# Patient Record
Sex: FEMALE | Race: WHITE | NOT HISPANIC OR LATINO | URBAN - METROPOLITAN AREA
[De-identification: names, ages, dates, MRNs, and addresses within clinical notes are randomized per-mention and may not be internally consistent; named-entity substitution may affect disease eponyms.]

---

## 2022-02-11 ENCOUNTER — SPOT (OUTPATIENT)
Dept: URBAN - METROPOLITAN AREA CLINIC 38 | Facility: CLINIC | Age: 72
Setting detail: DERMATOLOGY
End: 2022-02-11

## 2022-02-11 DIAGNOSIS — L57.0 ACTINIC KERATOSIS: ICD-10-CM

## 2022-02-11 PROBLEM — D23.5 OTHER BENIGN NEOPLASM OF SKIN OF TRUNK: Status: RESOLVED | Noted: 2022-02-11

## 2022-02-11 PROCEDURE — 17000 DESTRUCT PREMALG LESION: CPT

## 2022-02-11 PROCEDURE — 99203 OFFICE O/P NEW LOW 30 MIN: CPT

## 2023-02-08 ENCOUNTER — APPOINTMENT (OUTPATIENT)
Dept: URBAN - METROPOLITAN AREA CLINIC 204 | Age: 73
Setting detail: DERMATOLOGY
End: 2023-02-08

## 2023-02-08 DIAGNOSIS — L82.0 INFLAMED SEBORRHEIC KERATOSIS: ICD-10-CM

## 2023-02-08 DIAGNOSIS — L57.8 OTHER SKIN CHANGES DUE TO CHRONIC EXPOSURE TO NONIONIZING RADIATION: ICD-10-CM

## 2023-02-08 DIAGNOSIS — H61.03 CHONDRITIS OF EXTERNAL EAR: ICD-10-CM

## 2023-02-08 DIAGNOSIS — L72.0 EPIDERMAL CYST: ICD-10-CM

## 2023-02-08 DIAGNOSIS — L57.0 ACTINIC KERATOSIS: ICD-10-CM

## 2023-02-08 PROBLEM — D23.5 OTHER BENIGN NEOPLASM OF SKIN OF TRUNK: Status: ACTIVE | Noted: 2023-02-08

## 2023-02-08 PROBLEM — H61.032 CHONDRITIS OF LEFT EXTERNAL EAR: Status: ACTIVE | Noted: 2023-02-08

## 2023-02-08 PROCEDURE — OTHER LIQUID NITROGEN: OTHER

## 2023-02-08 PROCEDURE — 17000 DESTRUCT PREMALG LESION: CPT | Mod: 59

## 2023-02-08 PROCEDURE — 99214 OFFICE O/P EST MOD 30 MIN: CPT | Mod: 25

## 2023-02-08 PROCEDURE — OTHER MIPS QUALITY: OTHER

## 2023-02-08 PROCEDURE — 17110 DESTRUCT B9 LESION 1-14: CPT

## 2023-02-08 PROCEDURE — OTHER COUNSELING: OTHER

## 2023-02-08 PROCEDURE — OTHER PRESCRIPTION MEDICATION MANAGEMENT: OTHER

## 2023-02-08 PROCEDURE — OTHER PRESCRIPTION: OTHER

## 2023-02-08 RX ORDER — HYDROCORTISONE 25 MG/G
OINTMENT TOPICAL
Qty: 28.35 | Refills: 0 | Status: ERX | COMMUNITY
Start: 2023-02-08

## 2023-02-08 ASSESSMENT — LOCATION SIMPLE DESCRIPTION DERM
LOCATION SIMPLE: RIGHT UPPER BACK
LOCATION SIMPLE: GLABELLA
LOCATION SIMPLE: LEFT THIGH
LOCATION SIMPLE: RIGHT FOREHEAD
LOCATION SIMPLE: LEFT EAR
LOCATION SIMPLE: CHEST
LOCATION SIMPLE: LEFT UPPER ARM

## 2023-02-08 ASSESSMENT — LOCATION ZONE DERM
LOCATION ZONE: TRUNK
LOCATION ZONE: ARM
LOCATION ZONE: EAR
LOCATION ZONE: LEG
LOCATION ZONE: FACE

## 2023-02-08 ASSESSMENT — LOCATION DETAILED DESCRIPTION DERM
LOCATION DETAILED: LEFT ANTERIOR DISTAL UPPER ARM
LOCATION DETAILED: LEFT ANTERIOR DISTAL THIGH
LOCATION DETAILED: LEFT INFERIOR HELIX
LOCATION DETAILED: GLABELLA
LOCATION DETAILED: RIGHT MEDIAL SUPERIOR CHEST
LOCATION DETAILED: RIGHT LATERAL FOREHEAD
LOCATION DETAILED: LEFT ANTIHELIX
LOCATION DETAILED: RIGHT SUPERIOR UPPER BACK

## 2023-02-08 NOTE — PROCEDURE: PRESCRIPTION MEDICATION MANAGEMENT
Initiate Treatment: Apply Hydrocortisone ointment twice daily until clear. Can use during flare ups.
Detail Level: Zone
Render In Strict Bullet Format?: No

## 2023-02-08 NOTE — PROCEDURE: LIQUID NITROGEN
Detail Level: Detailed
Consent: The patient's verbal consent was obtained including but not limited to risks of crusting, scabbing, blistering, scarring, darker or lighter pigmentary change, recurrence, incomplete removal and infection.
Show Aperture Variable?: Yes
Aperture Size (Optional): C
Render Post-Care Instructions In Note?: no
Application Tool (Optional): Liquid Nitrogen Sprayer
Duration Of Freeze Thaw-Cycle (Seconds): 5
Post-Care Instructions: I reviewed with the patient in detail post-care instructions. Patient is to wear sunprotection, and avoid picking at any of the treated lesions. Pt may apply Vaseline to crusted or scabbing areas.
Number Of Freeze-Thaw Cycles: 2 freeze-thaw cycles
Consent: The patient's consent was obtained including but not limited to risks of crusting, scabbing, blistering, scarring, darker or lighter pigmentary change, recurrence, incomplete removal and infection.
Medical Necessity Clause: This procedure was medically necessary because the lesions that were treated were:
Medical Necessity Information: It is in your best interest to select a reason for this procedure from the list below. All of these items fulfill various CMS LCD requirements except the new and changing color options.
Spray Paint Text: The liquid nitrogen was applied to the skin utilizing a spray paint frosting technique.

## 2023-02-08 NOTE — HPI: SKIN LESION
Has Your Skin Lesion Been Treated?: been treated
Is This A New Presentation, Or A Follow-Up?: Skin Lesion
When Was It Treated?: February 2022

## 2023-02-10 ENCOUNTER — HOSPITAL ENCOUNTER (OUTPATIENT)
Dept: HOSPITAL 100 - PT | Age: 73
Discharge: HOME | End: 2023-02-10
Payer: MEDICARE

## 2023-02-10 DIAGNOSIS — M76.62: ICD-10-CM

## 2023-02-10 DIAGNOSIS — M76.61: Primary | ICD-10-CM

## 2023-02-10 DIAGNOSIS — M77.32: ICD-10-CM

## 2023-02-10 DIAGNOSIS — M77.31: ICD-10-CM

## 2023-02-10 PROCEDURE — 97110 THERAPEUTIC EXERCISES: CPT

## 2023-02-10 PROCEDURE — 97164 PT RE-EVAL EST PLAN CARE: CPT

## 2023-02-10 PROCEDURE — 97140 MANUAL THERAPY 1/> REGIONS: CPT

## 2023-02-10 PROCEDURE — 97161 PT EVAL LOW COMPLEX 20 MIN: CPT

## 2023-02-10 PROCEDURE — 97035 APP MDLTY 1+ULTRASOUND EA 15: CPT

## 2023-03-18 ENCOUNTER — HOSPITAL ENCOUNTER (OUTPATIENT)
Dept: HOSPITAL 100 - CT | Age: 73
Discharge: HOME | End: 2023-03-18
Payer: MEDICARE

## 2023-03-18 DIAGNOSIS — M76.61: Primary | ICD-10-CM

## 2023-03-18 DIAGNOSIS — M77.31: ICD-10-CM

## 2023-03-18 DIAGNOSIS — M72.2: ICD-10-CM

## 2023-03-18 PROCEDURE — 73700 CT LOWER EXTREMITY W/O DYE: CPT

## 2024-01-03 ENCOUNTER — APPOINTMENT (OUTPATIENT)
Dept: URBAN - METROPOLITAN AREA CLINIC 204 | Age: 74
Setting detail: DERMATOLOGY
End: 2024-01-03

## 2024-01-03 DIAGNOSIS — L57.8 OTHER SKIN CHANGES DUE TO CHRONIC EXPOSURE TO NONIONIZING RADIATION: ICD-10-CM

## 2024-01-03 DIAGNOSIS — H61.03 CHONDRITIS OF EXTERNAL EAR: ICD-10-CM

## 2024-01-03 DIAGNOSIS — L82.1 OTHER SEBORRHEIC KERATOSIS: ICD-10-CM

## 2024-01-03 DIAGNOSIS — L82.0 INFLAMED SEBORRHEIC KERATOSIS: ICD-10-CM

## 2024-01-03 PROBLEM — H61.032 CHONDRITIS OF LEFT EXTERNAL EAR: Status: ACTIVE | Noted: 2024-01-03

## 2024-01-03 PROBLEM — D23.5 OTHER BENIGN NEOPLASM OF SKIN OF TRUNK: Status: ACTIVE | Noted: 2024-01-03

## 2024-01-03 PROCEDURE — OTHER MIPS QUALITY: OTHER

## 2024-01-03 PROCEDURE — 99213 OFFICE O/P EST LOW 20 MIN: CPT | Mod: 25

## 2024-01-03 PROCEDURE — OTHER LIQUID NITROGEN: OTHER

## 2024-01-03 PROCEDURE — 17110 DESTRUCT B9 LESION 1-14: CPT

## 2024-01-03 PROCEDURE — OTHER COUNSELING: OTHER

## 2024-01-03 PROCEDURE — OTHER PRESCRIPTION MEDICATION MANAGEMENT: OTHER

## 2024-01-03 ASSESSMENT — LOCATION SIMPLE DESCRIPTION DERM
LOCATION SIMPLE: LEFT EAR
LOCATION SIMPLE: RIGHT BREAST
LOCATION SIMPLE: UPPER BACK
LOCATION SIMPLE: RIGHT UPPER BACK
LOCATION SIMPLE: ANTERIOR SCALP
LOCATION SIMPLE: LEFT UPPER BACK

## 2024-01-03 ASSESSMENT — LOCATION DETAILED DESCRIPTION DERM
LOCATION DETAILED: RIGHT MEDIAL BREAST 3-4:00 REGION
LOCATION DETAILED: SUPERIOR THORACIC SPINE
LOCATION DETAILED: MID-FRONTAL SCALP
LOCATION DETAILED: LEFT INFERIOR UPPER BACK
LOCATION DETAILED: RIGHT MEDIAL UPPER BACK
LOCATION DETAILED: RIGHT SUPERIOR UPPER BACK
LOCATION DETAILED: LEFT INFERIOR HELIX
LOCATION DETAILED: LEFT ANTIHELIX
LOCATION DETAILED: RIGHT SUPERIOR MEDIAL UPPER BACK
LOCATION DETAILED: RIGHT MEDIAL BREAST 4-5:00 REGION
LOCATION DETAILED: LEFT MEDIAL UPPER BACK

## 2024-01-03 ASSESSMENT — LOCATION ZONE DERM
LOCATION ZONE: SCALP
LOCATION ZONE: TRUNK
LOCATION ZONE: EAR

## 2024-01-03 NOTE — PROCEDURE: PRESCRIPTION MEDICATION MANAGEMENT
Continue Regimen: Apply Hydrocortisone ointment twice daily during flare ups.
Initiate Treatment: Apply OTC lidocaine gel
Detail Level: Zone
Render In Strict Bullet Format?: No

## 2024-01-03 NOTE — PROCEDURE: LIQUID NITROGEN
Consent: The patient's consent was obtained including but not limited to risks of crusting, scabbing, blistering, scarring, darker or lighter pigmentary change, recurrence, incomplete removal and infection.
Add 52 Modifier (Optional): no
Include Z78.9 (Other Specified Conditions Influencing Health Status) As An Associated Diagnosis?: Yes
Medical Necessity Clause: This procedure was medically necessary because the lesions that were treated were:
Detail Level: Detailed
Spray Paint Text: The liquid nitrogen was applied to the skin utilizing a spray paint frosting technique.
Post-Care Instructions: I reviewed with the patient in detail post-care instructions. Patient is to wear sunprotection, and avoid picking at any of the treated lesions. Pt may apply Vaseline to crusted or scabbing areas.
Medical Necessity Information: It is in your best interest to select a reason for this procedure from the list below. All of these items fulfill various CMS LCD requirements except the new and changing color options.

## 2024-01-17 ENCOUNTER — EVALUATION (OUTPATIENT)
Dept: PHYSICAL THERAPY | Facility: CLINIC | Age: 74
End: 2024-01-17
Payer: MEDICARE

## 2024-01-17 DIAGNOSIS — M75.41 IMPINGEMENT SYNDROME OF RIGHT SHOULDER: ICD-10-CM

## 2024-01-17 PROCEDURE — 97140 MANUAL THERAPY 1/> REGIONS: CPT | Mod: GP

## 2024-01-17 PROCEDURE — 97161 PT EVAL LOW COMPLEX 20 MIN: CPT | Mod: GP

## 2024-01-17 ASSESSMENT — PAIN - FUNCTIONAL ASSESSMENT: PAIN_FUNCTIONAL_ASSESSMENT: 0-10

## 2024-01-17 ASSESSMENT — ACTIVITIES OF DAILY LIVING (ADL): EFFECT OF PAIN ON DAILY ACTIVITIES: SEE GOALS

## 2024-01-17 ASSESSMENT — ENCOUNTER SYMPTOMS
LOSS OF SENSATION IN FEET: 0
DEPRESSION: 0
OCCASIONAL FEELINGS OF UNSTEADINESS: 0

## 2024-01-17 ASSESSMENT — PAIN SCALES - GENERAL: PAINLEVEL_OUTOF10: 5 - MODERATE PAIN

## 2024-01-17 NOTE — PROGRESS NOTES
Physical Therapy Evaluation and Treatment      Patient Name: Shona Hansen  MRN: [id number]  Today's Date: 1/17/2024  Time Calculation  Start Time: 1136  Stop Time: 1215  Time Calculation (min): 39 min    Visit: 1 /9  Assessment:  Rehab Prognosis: Good  C/C R shldr sx are associated with home remodeling done last summer.   Injection was done and films are negative.  Physical findings include limited shldr ROM, strength both with pain.   Continue with open to closed R shldr ROM/PRE as trinity. The physical therapist of record is the therapist who assumes primary responsibility for patient management and as such is held accountable for the coordination, continuation, and progression of the POC.  This patient's care and PT of record will be transferred from James Jacome PT to MAAME Galvan DPT  effective as of  1/17/24  Plan:  Treatment/Interventions: Cryotherapy, Dry needling, Education/ Instruction, Electrical stimulation, Hot pack, Manual therapy, Self care/ home management, Therapeutic exercises (IASTM/cupping)  PT Plan: Skilled PT  Duration: 4wks  Onset Date: 09/21/23  Certification Period Start Date: 01/17/24  Certification Period End Date: 04/16/24  Rehab Potential: Good  Plan of Care Agreement: Patient    Current Problem:   1. Impingement syndrome of right shoulder  Referral to Physical Therapy          Subjective    General:  General  Reason for Referral: R shldr impingement  Referred By: Marianne Peck CNP  Precautions:  Precautions  STEADI Fall Risk Score (The score of 4 or more indicates an increased risk of falling): 3  Precautions Comment: none    Pain:  Pain Assessment  Pain Assessment: 0-10  Pain Score: 5 - Moderate pain  Pain Location: Shoulder  Pain Orientation: Right  Effect of Pain on Daily Activities: see goals  Pain Interventions:  (steroid injection; films taken)    Prior Level of Function:  Prior Function Per Pt/Caregiver Report  Vocational: Retired  Hand Dominance: Right    Objective     Outcome  "Measures:  Other Measures  Other Outcome Measures: 36% QDI     Treatments:  PROM all dir  Pulley scaption 3'   Continue with open to closed R shldr ROM/PRE as trinity.  OP EDUCATION:  Outpatient Education  Individual(s) Educated: Patient  Patient/Caregiver Demonstrated Understanding: yes  Plan of Care Discussed and Agreed Upon: yes  Given pulleys  Goals:  Active       PT Problem       PT Goal 1       Start:  01/17/24    Expected End:  04/16/24       1. Independent HEP to allow for 50% reduction in max ADL C/C sx ( 5/10) 2-3wks  2. 0/10 night time sx to allow for uninterrupted sleep x 1wk ( 7/7) 2-3wks  3. Survey score improvement from 36% to 25% (QDI) 3-4wks  4. ROM increase to allow for improved ADL reaching (from 85 to 135 active elevation) 3-4wks  5. Strength increase to allow for improved ADL object handling (from 4-/5 to 4+/5 R shldr) 3-4wks         PT Goal 2       Start:  01/17/24    Expected End:  04/16/24       1.\"I want to use my arm normally again\".             Shoulder        Shoulder AROM WFL unless documented below  R Shoulder flexion: (180°): 85 (shldr pain)  L Shoulder flexion: (180°): 140  Shoulder PROM WFL unless documented below  R shoulder flexion: (180°): 155 (shldr pain)  L shoulder flexion: (180°): 165  R shoulder ER: (90°): 80 (shldr pain)  L shoulder ER: (90°): 90  R shoulder IR: (70°): 65 (shldr pain)  L shoulder IR: (70°): 80    Shoulder Strength WFL unless documented below  R shoulder abduction: (5/5): 4-/5 (shldr pain)  L shoulder abduction: (5/5): 5/5  R shoulder ER: (5/5): 4-/5 (shldr pain)  L shoulder ER: (5/5): 5/5  R shoulder IR: (5/5) : 4-/5 (shldr pain)  L shoulder IR: (5/5): 5/5    "

## 2024-01-17 NOTE — Clinical Note
January 17, 2024    AILYN Marks  [address] [facility] [place] [address]    Patient: Shona Hansen   YOB: 1950   Date of Visit: 1/17/2024       Dear Ailyn Marks  [address] [place] [address]    The attached plan of care is being sent to you because your patient’s medical reimbursement requires that you certify the plan of care. Your signature is required to allow uninterrupted insurance coverage.      You may indicate your approval by signing below and faxing this form back to us at Dept Fax: [phone].    Please call Dept: [phone] with any questions or concerns.    Thank you for this referral,        James Jacome PT  [address] [address]    Payer: Payor: MEDICARE / Plan: MEDICARE PART A AND B / Product Type: *No Product type* /                                                                         Date:     Dear James Jacome PT,     Re: Ms. Shona Hansen, MRN:[id number]    I certify that I have reviewed the attached plan of care and it is medically necessary for Ms. Shona Hansen (1950) who is under my care.          ______________________________________                    _________________  Provider name and credentials                                           Date and time                                                                                           Plan of Care 1/17/24   Effective from: 1/17/2024  Effective to: 4/16/2024    Plan ID: [id number]            Participants as of Finalize on 1/17/2024    Name Type Comments Contact Info    AILYN Marks Referring Provider  [phone]    James Jacome PT Physical Therapist  [phone]       Last Plan Note     Author: James Jacome PT Status: Signed Last edited: 1/17/2024 11:30 AM       Physical Therapy Evaluation and Treatment      Patient Name: Shona Hansen  MRN: [id number]  Today's Date: 1/17/2024  Time Calculation  Start  Time: 1136  Stop Time: 1215  Time Calculation (min): 39 min    Visit: 1 /9  Assessment:  Rehab Prognosis: Good  C/C R shldr sx are associated with home remodeling done last summer.   Injection was done and films are negative.  Physical findings include limited shldr ROM, strength both with pain.   Continue with open to closed R shldr ROM/PRE as trinity. The physical therapist of record is the therapist who assumes primary responsibility for patient management and as such is held accountable for the coordination, continuation, and progression of the POC.  This patient's care and PT of record will be transferred from James Jacome PT to MAAME VIVART  effective as of  1/17/24  Plan:  Treatment/Interventions: Cryotherapy, Dry needling, Education/ Instruction, Electrical stimulation, Hot pack, Manual therapy, Self care/ home management, Therapeutic exercises (IASTM/cupping)  PT Plan: Skilled PT  Duration: 4wks  Onset Date: 09/21/23  Certification Period Start Date: 01/17/24  Certification Period End Date: 04/16/24  Rehab Potential: Good  Plan of Care Agreement: Patient    Current Problem:   1. Impingement syndrome of right shoulder  Referral to Physical Therapy          Subjective    General:  General  Reason for Referral: R shldr impingement  Referred By: Marianne Peck CNP  Precautions:  Precautions  STEADI Fall Risk Score (The score of 4 or more indicates an increased risk of falling): 3  Precautions Comment: none    Pain:  Pain Assessment  Pain Assessment: 0-10  Pain Score: 5 - Moderate pain  Pain Location: Shoulder  Pain Orientation: Right  Effect of Pain on Daily Activities: see goals  Pain Interventions:  (steroid injection; films taken)    Prior Level of Function:  Prior Function Per Pt/Caregiver Report  Vocational: Retired  Hand Dominance: Right    Objective     Outcome Measures:  Other Measures  Other Outcome Measures: 36% QDI     Treatments:  PROM all dir  Pulley scaption 3'   Continue with open to closed R shldr  "ROM/PRE as triniyt.  OP EDUCATION:  Outpatient Education  Individual(s) Educated: Patient  Patient/Caregiver Demonstrated Understanding: yes  Plan of Care Discussed and Agreed Upon: yes  Given pulleys  Goals:  Active       PT Problem       PT Goal 1       Start:  01/17/24    Expected End:  04/16/24       1. Independent HEP to allow for 50% reduction in max ADL C/C sx ( 5/10) 2-3wks  2. 0/10 night time sx to allow for uninterrupted sleep x 1wk ( 7/7) 2-3wks  3. Survey score improvement from 36% to 25% (QDI) 3-4wks  4. ROM increase to allow for improved ADL reaching (from 85 to 135 active elevation) 3-4wks  5. Strength increase to allow for improved ADL object handling (from 4-/5 to 4+/5 R shldr) 3-4wks         PT Goal 2       Start:  01/17/24    Expected End:  04/16/24       1.\"I want to use my arm normally again\".             Shoulder        Shoulder AROM WFL unless documented below  R Shoulder flexion: (180°): 85 (shldr pain)  L Shoulder flexion: (180°): 140  Shoulder PROM WFL unless documented below  R shoulder flexion: (180°): 155 (shldr pain)  L shoulder flexion: (180°): 165  R shoulder ER: (90°): 80 (shldr pain)  L shoulder ER: (90°): 90  R shoulder IR: (70°): 65 (shldr pain)  L shoulder IR: (70°): 80    Shoulder Strength WFL unless documented below  R shoulder abduction: (5/5): 4-/5 (shldr pain)  L shoulder abduction: (5/5): 5/5  R shoulder ER: (5/5): 4-/5 (shldr pain)  L shoulder ER: (5/5): 5/5  R shoulder IR: (5/5) : 4-/5 (shldr pain)  L shoulder IR: (5/5): 5/5           Current Participants as of 1/17/2024    Name Type Comments Contact Info    Marianne Peck, SWATHI-CNP Referring Provider  [phone]    Signature pending    James Jacome, PT Physical Therapist  [phone]          "

## 2024-01-26 ENCOUNTER — TREATMENT (OUTPATIENT)
Dept: PHYSICAL THERAPY | Facility: CLINIC | Age: 74
End: 2024-01-26
Payer: MEDICARE

## 2024-01-26 ENCOUNTER — APPOINTMENT (OUTPATIENT)
Dept: PHYSICAL THERAPY | Facility: CLINIC | Age: 74
End: 2024-01-26
Payer: MEDICARE

## 2024-01-26 DIAGNOSIS — M75.41 IMPINGEMENT SYNDROME OF RIGHT SHOULDER: ICD-10-CM

## 2024-01-26 PROCEDURE — 97110 THERAPEUTIC EXERCISES: CPT | Mod: GP,CQ

## 2024-01-26 PROCEDURE — 97140 MANUAL THERAPY 1/> REGIONS: CPT | Mod: GP,CQ

## 2024-01-26 ASSESSMENT — PAIN - FUNCTIONAL ASSESSMENT: PAIN_FUNCTIONAL_ASSESSMENT: 0-10

## 2024-01-26 ASSESSMENT — PAIN SCALES - GENERAL: PAINLEVEL_OUTOF10: 2

## 2024-01-26 NOTE — PROGRESS NOTES
"Physical Therapy Treatment    Patient Name: Shona Hansen  MRN: [id number]  Today's Date: 1/26/2024  Time Calculation  Start Time: 0915  Stop Time: 1000  Time Calculation (min): 45 min    Assessment:   Patient identified by name and date of birth. Patient presented with empty end feel with PROM with facial grimacing throughout. Patient demonstrated fair tolerance with exercises and required cues to decrease with intensity with iso due to report of increased Sx.   Plan:  OP PT Plan  Treatment/Interventions: Cryotherapy, Dry needling, Education/ Instruction, Electrical stimulation, Hot pack, Manual therapy, Self care/ home management, Therapeutic exercises (IASTM/cupping)  PT Plan: Skilled PT  Duration: 4wks  Onset Date: 09/21/23  Certification Period Start Date: 01/17/24  Certification Period End Date: 04/16/24  Rehab Potential: Good  Plan of Care Agreement: Patient  Continue with strengthening and ROM exercises with manual as needed for increased ease with overhead reaching.   Current Problem  Problem List Items Addressed This Visit             ICD-10-CM    Impingement syndrome of right shoulder M75.41       Subjective Patient reported 3/10 pain after treatment. Provided and reviewed handout and pulleys for HEP she verbalized good understanding.   General  Reason for Referral: R shldr impingement  Referred By: Marianne Peck CNP  General Comment: Visit 2  Precautions  Precautions  Precautions Comment: none, per patient she had blader surgery 01/22/24 she reported she is not alowed to lift untill she see the doctor 02/02/24    Pain  Pain Assessment: 0-10  Pain Score: 2  Pain Location: Shoulder  Pain Orientation: Right, Left     Treatments:  Therapeutic Exercise  Therapeutic Exercise Performed: Yes  Pulley 2' fwd 2' scap  Shoulder iso   Flex/ext/abd/add x10 3\" hold   Wall slide x10 5\" hold  Supine cane   -flex x10   -press up x10   -ER x10   -abd x10   Supine abc 1 cycle     Manual Therapy  Manual Therapy Performed: " "Yes  PROM and STW to R UE      OP EDUCATION:   Access Code: [email or web address]  URL: [email or web address]  Date: 01/26/2024  Prepared by: Michelle Cao    Exercises  - Seated Shoulder Flexion AAROM with Pulley Behind  - 1 x daily - 7 x weekly - 2 sets - 10 reps - 3-5 hold  - Seated Shoulder Abduction AAROM with Pulley Behind  - 1 x daily - 7 x weekly - 2 sets - 10 reps - 3-5 hold  - Shoulder Flexion Wall Slide with Towel  - 1 x daily - 7 x weekly - 1 sets - 10 reps - 5-10 hold  - Supine Shoulder External Rotation with Dowel  - 1 x daily - 7 x weekly - 1 sets - 10 reps  - Supine Shoulder Flexion Extension AAROM with Dowel  - 1 x daily - 7 x weekly - 1 sets - 10 reps  - Supine Shoulder Press with Dowel  - 1 x daily - 7 x weekly - 1 sets - 10 reps  - Supine Shoulder Abduction AAROM with Dowel  - 1 x daily - 7 x weekly - 1 sets - 10 reps  - Standing Isometric Shoulder Abduction with Doorway - Arm Bent  - 1 x daily - 7 x weekly - 2 sets - 10 reps - 3-5 hold  - Isometric Shoulder Adduction  - 1 x daily - 7 x weekly - 2 sets - 10 reps - 3-5 hold  - Standing Isometric Shoulder Flexion with Doorway - Arm Bent  - 1 x daily - 7 x weekly - 2 sets - 10 reps - 3-5 hold  - Standing Isometric Shoulder Extension with Doorway - Arm Bent  - 1 x daily - 7 x weekly - 2 sets - 10 reps - 3-5 hold    Goals:  Active       PT Problem       PT Goal 1       Start:  01/17/24    Expected End:  04/16/24       1. Independent HEP to allow for 50% reduction in max ADL C/C sx ( 5/10) 2-3wks  2. 0/10 night time sx to allow for uninterrupted sleep x 1wk ( 7/7) 2-3wks  3. Survey score improvement from 36% to 25% (QDI) 3-4wks  4. ROM increase to allow for improved ADL reaching (from 85 to 135 active elevation) 3-4wks  5. Strength increase to allow for improved ADL object handling (from 4-/5 to 4+/5 R shldr) 3-4wks         PT Goal 2       Start:  01/17/24    Expected End:  04/16/24       1.\"I want to use my arm normally again\".            "

## 2024-01-29 ENCOUNTER — TREATMENT (OUTPATIENT)
Dept: PHYSICAL THERAPY | Facility: CLINIC | Age: 74
End: 2024-01-29
Payer: MEDICARE

## 2024-01-29 DIAGNOSIS — M75.41 IMPINGEMENT SYNDROME OF RIGHT SHOULDER: ICD-10-CM

## 2024-01-29 PROCEDURE — 97110 THERAPEUTIC EXERCISES: CPT | Mod: GP,CQ

## 2024-01-29 PROCEDURE — 97140 MANUAL THERAPY 1/> REGIONS: CPT | Mod: GP,CQ

## 2024-01-29 ASSESSMENT — PAIN SCALES - GENERAL: PAINLEVEL_OUTOF10: 1

## 2024-01-29 ASSESSMENT — PAIN - FUNCTIONAL ASSESSMENT: PAIN_FUNCTIONAL_ASSESSMENT: 0-10

## 2024-01-29 NOTE — PROGRESS NOTES
"Physical Therapy Treatment    Patient Name: Shona Hansen  MRN: [id number]  Today's Date: 1/31/2024  Time Calculation  Start Time: 1703  Stop Time: 1742  Time Calculation (min): 39 min    General:  General  Reason for Referral: R shldr impingement  Referred By: Marianne Peck CNP  General Comment: Visit 3    Assessment:Patient identified by name and date of birth.   Mild exacerbation of pain with iso ABD greatest.   Tactile and verbal cues needed for improved technique with wand stretches.   Added protraction in supine this date for improved scap and shoulder stability.          Plan:  OP PT Plan  Treatment/Interventions: Cryotherapy, Dry needling, Education/ Instruction, Electrical stimulation, Hot pack, Manual therapy, Self care/ home management, Therapeutic exercises (IASTM/cupping)  PT Plan: Skilled PT  Duration: 4wks  Onset Date: 09/21/23  Certification Period Start Date: 01/17/24  Certification Period End Date: 04/16/24  Rehab Potential: Good  Plan of Care Agreement: Patient    Current Problem  Problem List Items Addressed This Visit             ICD-10-CM    Impingement syndrome of right shoulder M75.41       Subjective Patient is compliant with HEP completion with no issues to report at this time.  Notes that reaching out to the side and reaching forward. Both shoulders are painful.     Precautions  Precautions  Precautions Comment: none, per patient she had blader surgery 01/22/24 she reported she is not alowed to lift untill she see the doctor 02/02/24    Pain  Pain Assessment: 0-10  Pain Score: 1  Pain Location: Shoulder  Pain Orientation: Right    Treatments:  Therapeutic Exercise  Pulley 2' fwd 2' scap  Shoulder iso   Flex/ext/abd/add x10 3\" hold   Wall slide x10 5\" hold  Supine cane   -flex x10   -press up x10   -ER x10   -abd x10   Supine abc 1 cycle   Supine protraction 2 x 10 (N)      Manual Therapy  Manual Therapy Performed: Yes  PROM and STW to R UE     OP EDUCATION:   Access Code: [email or web address]  URL: " "[email or web address]  Date: 01/26/2024  Prepared by: Michelle Cao     Exercises  - Seated Shoulder Flexion AAROM with Pulley Behind  - 1 x daily - 7 x weekly - 2 sets - 10 reps - 3-5 hold  - Seated Shoulder Abduction AAROM with Pulley Behind  - 1 x daily - 7 x weekly - 2 sets - 10 reps - 3-5 hold  - Shoulder Flexion Wall Slide with Towel  - 1 x daily - 7 x weekly - 1 sets - 10 reps - 5-10 hold  - Supine Shoulder External Rotation with Dowel  - 1 x daily - 7 x weekly - 1 sets - 10 reps  - Supine Shoulder Flexion Extension AAROM with Dowel  - 1 x daily - 7 x weekly - 1 sets - 10 reps  - Supine Shoulder Press with Dowel  - 1 x daily - 7 x weekly - 1 sets - 10 reps  - Supine Shoulder Abduction AAROM with Dowel  - 1 x daily - 7 x weekly - 1 sets - 10 reps  - Standing Isometric Shoulder Abduction with Doorway - Arm Bent  - 1 x daily - 7 x weekly - 2 sets - 10 reps - 3-5 hold  - Isometric Shoulder Adduction  - 1 x daily - 7 x weekly - 2 sets - 10 reps - 3-5 hold  - Standing Isometric Shoulder Flexion with Doorway - Arm Bent  - 1 x daily - 7 x weekly - 2 sets - 10 reps - 3-5 hold  - Standing Isometric Shoulder Extension with Doorway - Arm Bent  - 1 x daily - 7 x weekly - 2 sets - 10 reps - 3-5 hold    Goals:  Active       PT Problem       PT Goal 1       Start:  01/17/24    Expected End:  04/16/24       1. Independent HEP to allow for 50% reduction in max ADL C/C sx ( 5/10) 2-3wks  2. 0/10 night time sx to allow for uninterrupted sleep x 1wk ( 7/7) 2-3wks  3. Survey score improvement from 36% to 25% (QDI) 3-4wks  4. ROM increase to allow for improved ADL reaching (from 85 to 135 active elevation) 3-4wks  5. Strength increase to allow for improved ADL object handling (from 4-/5 to 4+/5 R shldr) 3-4wks         PT Goal 2       Start:  01/17/24    Expected End:  04/16/24       1.\"I want to use my arm normally again\".            "

## 2024-01-31 ENCOUNTER — TREATMENT (OUTPATIENT)
Dept: PHYSICAL THERAPY | Facility: CLINIC | Age: 74
End: 2024-01-31
Payer: MEDICARE

## 2024-01-31 DIAGNOSIS — M75.41 IMPINGEMENT SYNDROME OF RIGHT SHOULDER: ICD-10-CM

## 2024-01-31 PROCEDURE — 97110 THERAPEUTIC EXERCISES: CPT | Mod: GP,CQ

## 2024-01-31 PROCEDURE — 97140 MANUAL THERAPY 1/> REGIONS: CPT | Mod: GP,CQ

## 2024-01-31 ASSESSMENT — PAIN - FUNCTIONAL ASSESSMENT: PAIN_FUNCTIONAL_ASSESSMENT: 0-10

## 2024-01-31 ASSESSMENT — PAIN SCALES - GENERAL: PAINLEVEL_OUTOF10: 2

## 2024-01-31 NOTE — PROGRESS NOTES
"Physical Therapy Treatment    Patient Name: Shona Hansen  MRN: [id number]  Today's Date: 1/31/2024  Time Calculation  Start Time: 0915  Stop Time: 1001  Time Calculation (min): 46 min    General:  General  Reason for Referral: R shldr impingement  Referred By: Marianne Peck CNP  General Comment: Visit 4    Assessment:Patient identified by name and date of birth.   Patient is guarded with isometric exercises.   Tightness in bicep and UT, STW completed in this regions prior to PROM  Decreased end range tightness with passive flexion.          Plan:  Plan to continue with strength progression and ROM as able. JA     OP PT Plan  Treatment/Interventions: Cryotherapy, Dry needling, Education/ Instruction, Electrical stimulation, Hot pack, Manual therapy, Self care/ home management, Therapeutic exercises (IASTM/cupping)  PT Plan: Skilled PT  Duration: 4wks  Onset Date: 09/21/23  Certification Period Start Date: 01/17/24  Certification Period End Date: 04/16/24  Rehab Potential: Good  Plan of Care Agreement: Patient    Current Problem  Problem List Items Addressed This Visit             ICD-10-CM    Impingement syndrome of right shoulder M75.41       Subjective Patient is compliant with HEP completion with no issues to report at this time.  Patient reports shoulder feels tight today. Notes she was sore the night after PT. States that she wants to do what needs done to get better.     Precautions  Precautions  Precautions Comment: none, per patient she had blader surgery 01/22/24 she reported she is not alowed to lift untill she see the doctor 02/02/24    Pain  Pain Assessment: 0-10  Pain Score: 2  Pain Location: Shoulder  Pain Orientation: Right    Treatments:  Therapeutic Exercise  Pulley 2' fwd 2' scap  Shoulder iso   Flex/ext/abd/add x10 3\" hold   Wall slide x10 5\" hold  Supine cane   -flex 2 x 10 (P reps)  -press up 2 x 10 (P reps)   -ER 2 x 10 (P reps)    -abd 2 x 10 (P reps)  Supine abc 1 cycle   Supine protraction 2 x " 10 (N)      Manual Therapy  Manual Therapy Performed: Yes  PROM and STW to R UE     OP EDUCATION:    Access Code: [email or web address]  [facility]: [email or web address]  Date: 01/26/2024  Prepared by: Michelle Cao     Exercises  - Seated Shoulder Flexion AAROM with Pulley Behind  - 1 x daily - 7 x weekly - 2 sets - 10 reps - 3-5 hold  - Seated Shoulder Abduction AAROM with Pulley Behind  - 1 x daily - 7 x weekly - 2 sets - 10 reps - 3-5 hold  - Shoulder Flexion Wall Slide with Towel  - 1 x daily - 7 x weekly - 1 sets - 10 reps - 5-10 hold  - Supine Shoulder External Rotation with Dowel  - 1 x daily - 7 x weekly - 1 sets - 10 reps  - Supine Shoulder Flexion Extension AAROM with Dowel  - 1 x daily - 7 x weekly - 1 sets - 10 reps  - Supine Shoulder Press with Dowel  - 1 x daily - 7 x weekly - 1 sets - 10 reps  - Supine Shoulder Abduction AAROM with Dowel  - 1 x daily - 7 x weekly - 1 sets - 10 reps  - Standing Isometric Shoulder Abduction with Doorway - Arm Bent  - 1 x daily - 7 x weekly - 2 sets - 10 reps - 3-5 hold  - Isometric Shoulder Adduction  - 1 x daily - 7 x weekly - 2 sets - 10 reps - 3-5 hold  - Standing Isometric Shoulder Flexion with Doorway - Arm Bent  - 1 x daily - 7 x weekly - 2 sets - 10 reps - 3-5 hold  - Standing Isometric Shoulder Extension with Doorway - Arm Bent  - 1 x daily - 7 x weekly - 2 sets - 10 reps - 3-5 hold    Goals:  Active       PT Problem       PT Goal 1       Start:  01/17/24    Expected End:  04/16/24       1. Independent HEP to allow for 50% reduction in max ADL C/C sx ( 5/10) 2-3wks  2. 0/10 night time sx to allow for uninterrupted sleep x 1wk ( 7/7) 2-3wks  3. Survey score improvement from 36% to 25% (QDI) 3-4wks  4. ROM increase to allow for improved ADL reaching (from 85 to 135 active elevation) 3-4wks  5. Strength increase to allow for improved ADL object handling (from 4-/5 to 4+/5 R shldr) 3-4wks         PT Goal 2       Start:  01/17/24    Expected End:  04/16/24     "   1.\"I want to use my arm normally again\".            "

## 2024-02-02 ENCOUNTER — APPOINTMENT (OUTPATIENT)
Dept: PHYSICAL THERAPY | Facility: CLINIC | Age: 74
End: 2024-02-02
Payer: MEDICARE

## 2024-02-05 ENCOUNTER — APPOINTMENT (OUTPATIENT)
Dept: PHYSICAL THERAPY | Facility: CLINIC | Age: 74
End: 2024-02-05
Payer: MEDICARE

## 2024-02-09 ENCOUNTER — TREATMENT (OUTPATIENT)
Dept: PHYSICAL THERAPY | Facility: CLINIC | Age: 74
End: 2024-02-09
Payer: MEDICARE

## 2024-02-09 DIAGNOSIS — M75.41 IMPINGEMENT SYNDROME OF RIGHT SHOULDER: ICD-10-CM

## 2024-02-09 PROCEDURE — 97110 THERAPEUTIC EXERCISES: CPT | Mod: GP,CQ

## 2024-02-09 PROCEDURE — 97140 MANUAL THERAPY 1/> REGIONS: CPT | Mod: GP,CQ

## 2024-02-09 ASSESSMENT — PAIN SCALES - GENERAL: PAINLEVEL_OUTOF10: 2

## 2024-02-09 ASSESSMENT — PAIN - FUNCTIONAL ASSESSMENT: PAIN_FUNCTIONAL_ASSESSMENT: 0-10

## 2024-02-09 NOTE — PROGRESS NOTES
"Physical Therapy Treatment    Patient Name: Shona Hansen  MRN: [id number]  Today's Date: 2/9/2024  Time Calculation  Start Time: 1000  Stop Time: 1045  Time Calculation (min): 45 min      Assessment:   Patient identified by name and date of birth. Patient demonstrated good understanding of exercises and tolerance of addition of AROM. She presented with firm end feel with PROM.     Plan:  OP PT Plan  Treatment/Interventions: Cryotherapy, Dry needling, Education/ Instruction, Electrical stimulation, Hot pack, Manual therapy, Self care/ home management, Therapeutic exercises (IASTM/cupping)  PT Plan: Skilled PT  Duration: 4wks  Onset Date: 09/21/23  Certification Period Start Date: 01/17/24  Certification Period End Date: 04/16/24  Rehab Potential: Good  Plan of Care Agreement: Patient  Continue with progression of strengthening and ROM as patient tolerates with STW and PROM as needed for increased ease with overhead ALD's. AW  Current Problem  Problem List Items Addressed This Visit             ICD-10-CM    Impingement syndrome of right shoulder M75.41       Subjective Patient reported compliance with HEP 40% of the time and verbalized understanding.  She reported 1-2/10 pain after treatment.  General  Reason for Referral: R shldr impingement  Referred By: Marianne Peck CNP  General Comment: Visit 5  Precautions  Precautions  Precautions Comment: none, per patient she had blader surgery 01/22/24 she reported she is not alowed to lift untill she see the doctor 02/02/24    Pain  Pain Assessment: 0-10  Pain Score: 2  Pain Location: Shoulder  Pain Orientation: Right     Treatments:  Therapeutic Exercise  Therapeutic Exercise Performed: Yes  Pulley 2' fwd 2' scap  Wall slide x10 5\" hold  Shoulder flex x10 (N)  Shoulder abd x10 (N)  Shoulder iso   -Flex/ext/abd/add x10 3\" hold   Supine cane   -flex 2 x 10   -press up 2 x 10   -ER 2 x 10   -abd 2 x 10   Supine abc 1 cycle   Supine protraction 2 x 10 (N)     Manual " "Therapy  Manual Therapy Performed: Yes    PROM and STW to R UE       OP EDUCATION:    Access Code: [email or web address]  URL: [email or web address]  Date: 01/26/2024  Prepared by: Michelle Cao     Exercises  - Seated Shoulder Flexion AAROM with Pulley Behind  - 1 x daily - 7 x weekly - 2 sets - 10 reps - 3-5 hold  - Seated Shoulder Abduction AAROM with Pulley Behind  - 1 x daily - 7 x weekly - 2 sets - 10 reps - 3-5 hold  - Shoulder Flexion Wall Slide with Towel  - 1 x daily - 7 x weekly - 1 sets - 10 reps - 5-10 hold  - Supine Shoulder External Rotation with Dowel  - 1 x daily - 7 x weekly - 1 sets - 10 reps  - Supine Shoulder Flexion Extension AAROM with Dowel  - 1 x daily - 7 x weekly - 1 sets - 10 reps  - Supine Shoulder Press with Dowel  - 1 x daily - 7 x weekly - 1 sets - 10 reps  - Supine Shoulder Abduction AAROM with Dowel  - 1 x daily - 7 x weekly - 1 sets - 10 reps  - Standing Isometric Shoulder Abduction with Doorway - Arm Bent  - 1 x daily - 7 x weekly - 2 sets - 10 reps - 3-5 hold  - Isometric Shoulder Adduction  - 1 x daily - 7 x weekly - 2 sets - 10 reps - 3-5 hold  - Standing Isometric Shoulder Flexion with Doorway - Arm Bent  - 1 x daily - 7 x weekly - 2 sets - 10 reps - 3-5 hold  - Standing Isometric Shoulder Extension with Doorway - Arm Bent  - 1 x daily - 7 x weekly - 2 sets - 10 reps - 3-5 hold    Goals:  Active       PT Problem       PT Goal 1       Start:  01/17/24    Expected End:  04/16/24       1. Independent HEP to allow for 50% reduction in max ADL C/C sx ( 5/10) 2-3wks  2. 0/10 night time sx to allow for uninterrupted sleep x 1wk ( 7/7) 2-3wks  3. Survey score improvement from 36% to 25% (QDI) 3-4wks  4. ROM increase to allow for improved ADL reaching (from 85 to 135 active elevation) 3-4wks  5. Strength increase to allow for improved ADL object handling (from 4-/5 to 4+/5 R shldr) 3-4wks         PT Goal 2       Start:  01/17/24    Expected End:  04/16/24       1.\"I want to " "use my arm normally again\".            "

## 2024-02-12 ENCOUNTER — TREATMENT (OUTPATIENT)
Dept: PHYSICAL THERAPY | Facility: CLINIC | Age: 74
End: 2024-02-12
Payer: MEDICARE

## 2024-02-12 DIAGNOSIS — M75.41 IMPINGEMENT SYNDROME OF RIGHT SHOULDER: ICD-10-CM

## 2024-02-12 PROCEDURE — 97110 THERAPEUTIC EXERCISES: CPT | Mod: GP,CQ

## 2024-02-12 PROCEDURE — 97140 MANUAL THERAPY 1/> REGIONS: CPT | Mod: GP,CQ

## 2024-02-12 ASSESSMENT — PAIN SCALES - GENERAL: PAINLEVEL_OUTOF10: 1

## 2024-02-12 NOTE — PROGRESS NOTES
"Physical Therapy Treatment    Patient Name: Shona Hansen  MRN: [id number]  Today's Date: 2/12/2024  Time Calculation  Start Time: 1316  Stop Time: 1400  Time Calculation (min): 44 min  General:  General  Reason for Referral: R shldr impingement  Referred By: Marianne Peck CNP  General Comment: Visit 6    Assessment:Patient identified by name and date of birth.      Progressed to light resistance with handout given.   STW along the bicep and UT this date with tightness noted.   Added new exercises to HEP with correct technique demonstrated and patient verbalizing understanding of instruction. (see below)    Plan:  Plan to continue with progression of UE strength. JA  OP PT Plan  Treatment/Interventions: Cryotherapy, Dry needling, Education/ Instruction, Electrical stimulation, Hot pack, Manual therapy, Self care/ home management, Therapeutic exercises (IASTM/cupping)  PT Plan: Skilled PT  Duration: 4wks  Onset Date: 09/21/23  Certification Period Start Date: 01/17/24  Certification Period End Date: 04/16/24  Rehab Potential: Good  Plan of Care Agreement: Patient    Current Problem  Problem List Items Addressed This Visit             ICD-10-CM    Impingement syndrome of right shoulder M75.41       Subjective Patient is compliant with HEP completion with no issues to report at this time.   States that she went back to MD regarding bladder surgery and does not have any restrictions at this time.       Precautions  Precautions  Precautions Comment: none, per patient she had blader surgery 01/22/24 she reported she is not alowed to lift untill she see the doctor 02/02/24    Pain  Pain Score: 1  Pain Location: Shoulder  Pain Orientation: Right    Treatments:  Therapeutic Exercise Performed: Yes  Pulley 2' fwd 2' scap  Wall slide x10 5\" hold  Wall flex with bolster 2 x 10 (N)   Resisted Rows 2 x 10 (N)   Ball on wall (N)   - flex   - Med/lat x 10  Shoulder seated flex x10   Shoulder seated abd x10   Shoulder iso " "  -Flex/ext/abd/add x10 3\" hold   Supine cane   -flex 2 x 10   -press up 2 x 10   -ER 2 x 10   -abd 2 x 10   Supine abc 1 cycle (P to sitting)  Supine protraction 2 x 10 (N)      Manual Therapy  Manual Therapy Performed: Yes     PROM and STW to R UE    OP EDUCATION:     Access Code: [email or web address]  URL: [email or web address]  Date: 02/12/2024  Prepared by: Rosa M Mcneal    Exercises  - Standing Row with Anchored Resistance  - 1 x daily - 7 x weekly - 2 sets - 10 reps  - Shoulder Extension with Resistance - Palms Forward  - 1 x daily - 7 x weekly - 2 sets - 10 reps  Access Code: [email or web address]  URL: [email or web address]  Date: 01/26/2024  Prepared by: Michelle Cao     Exercises  - Seated Shoulder Flexion AAROM with Pulley Behind  - 1 x daily - 7 x weekly - 2 sets - 10 reps - 3-5 hold  - Seated Shoulder Abduction AAROM with Pulley Behind  - 1 x daily - 7 x weekly - 2 sets - 10 reps - 3-5 hold  - Shoulder Flexion Wall Slide with Towel  - 1 x daily - 7 x weekly - 1 sets - 10 reps - 5-10 hold  - Supine Shoulder External Rotation with Dowel  - 1 x daily - 7 x weekly - 1 sets - 10 reps  - Supine Shoulder Flexion Extension AAROM with Dowel  - 1 x daily - 7 x weekly - 1 sets - 10 reps  - Supine Shoulder Press with Dowel  - 1 x daily - 7 x weekly - 1 sets - 10 reps  - Supine Shoulder Abduction AAROM with Dowel  - 1 x daily - 7 x weekly - 1 sets - 10 reps  - Standing Isometric Shoulder Abduction with Doorway - Arm Bent  - 1 x daily - 7 x weekly - 2 sets - 10 reps - 3-5 hold  - Isometric Shoulder Adduction  - 1 x daily - 7 x weekly - 2 sets - 10 reps - 3-5 hold  - Standing Isometric Shoulder Flexion with Doorway - Arm Bent  - 1 x daily - 7 x weekly - 2 sets - 10 reps - 3-5 hold  - Standing Isometric Shoulder Extension with Doorway - Arm Bent  - 1 x daily - 7 x weekly - 2 sets - 10 reps - 3-5 hold    Goals:  Active       PT Problem       PT Goal 1       Start:  01/17/24    Expected End:  " "04/16/24       1. Independent HEP to allow for 50% reduction in max ADL C/C sx ( 5/10) 2-3wks  2. 0/10 night time sx to allow for uninterrupted sleep x 1wk ( 7/7) 2-3wks  3. Survey score improvement from 36% to 25% (QDI) 3-4wks  4. ROM increase to allow for improved ADL reaching (from 85 to 135 active elevation) 3-4wks  5. Strength increase to allow for improved ADL object handling (from 4-/5 to 4+/5 R shldr) 3-4wks         PT Goal 2       Start:  01/17/24    Expected End:  04/16/24       1.\"I want to use my arm normally again\".            "

## 2024-02-16 ENCOUNTER — TREATMENT (OUTPATIENT)
Dept: PHYSICAL THERAPY | Facility: CLINIC | Age: 74
End: 2024-02-16
Payer: MEDICARE

## 2024-02-16 DIAGNOSIS — M75.41 IMPINGEMENT SYNDROME OF RIGHT SHOULDER: ICD-10-CM

## 2024-02-16 PROCEDURE — 97140 MANUAL THERAPY 1/> REGIONS: CPT | Mod: GP,CQ

## 2024-02-16 PROCEDURE — 97110 THERAPEUTIC EXERCISES: CPT | Mod: GP,CQ

## 2024-02-16 ASSESSMENT — PAIN - FUNCTIONAL ASSESSMENT: PAIN_FUNCTIONAL_ASSESSMENT: 0-10

## 2024-02-16 ASSESSMENT — PAIN SCALES - GENERAL: PAINLEVEL_OUTOF10: 1

## 2024-02-16 NOTE — PROGRESS NOTES
"Physical Therapy Treatment    Patient Name: Shona Hansen  MRN: [id number]  Today's Date: 2/16/2024  Time Calculation  Start Time: 1004  Stop Time: 1047  Time Calculation (min): 43 min  PT Therapeutic Procedures Time Entry  Manual Therapy Time Entry: 12  Therapeutic Exercise Time Entry: 29       Assessment:   Patient identified by name and date of birth. Patient was able to progress with t-band walkouts with mild increased soreness after with ER. She demonstrated improved ROM with PROM with decreased facial grimacing.     Plan:  OP PT Plan  Treatment/Interventions: Cryotherapy, Dry needling, Education/ Instruction, Electrical stimulation, Hot pack, Manual therapy, Self care/ home management, Therapeutic exercises (IASTM/cupping)  PT Plan: Skilled PT  Duration: 4wks  Onset Date: 09/21/23  Certification Period Start Date: 01/17/24  Certification Period End Date: 04/16/24  Rehab Potential: Good  Plan of Care Agreement: Patient    Continue with progression of strengthening and ROM with manual as needed for increased ease with overhead ADL's. AW  Current Problem  Problem List Items Addressed This Visit             ICD-10-CM    Impingement syndrome of right shoulder M75.41       Subjective Patient reported compliance with HEP 50% of the time and verbalized understanding.  She reported increased soreness after treatment.   General  Reason for Referral: R shldr impingement  Referred By: Marianne Peck CNP  General Comment: Visit 7  Precautions  Precautions  Precautions Comment: none, per patient she had blader surgery 01/22/24 she reported she is not alowed to lift untill she see the doctor 02/02/24    Pain  Pain Assessment: 0-10  Pain Score: 1  Pain Location: Shoulder  Pain Orientation: Right     Treatments:  Therapeutic Exercise  Therapeutic Exercise Performed: Yes  Pulley 2' fwd 2' scap  Wall slide x10 5\" hold  Wall flex with bolster 2 x 10 (N)   Resisted Rows 2 x 10 (N)   Ball on wall 2x 10   - flex   - Med/lat x " "10  Shoulder seated flex x10   Shoulder seated abd x10   Shoulder iso with t-band (P) orange band 2 steps  -Flex/ext/abd/add x10 3\" hold   Supine cane   -flex 2 x 10   -press up 2 x 10   -ER 2 x 10   -abd 2 x 10   Supine abc 1 cycle (X)  Supine protraction 2 x 10 (N)     Manual Therapy  Manual Therapy Performed: Yes   PROM and STW to R UE     OP EDUCATION:   Access Code: [email or web address]  URL: [email or web address]  Date: 02/16/2024  Prepared by: Michelle Cao    Exercises  - Standing Shoulder Flexion Reactive Isometric  - 1 x daily - 7 x weekly - 2 sets - 10 reps  - Standing Shoulder Row Reactive Isometric  - 1 x daily - 7 x weekly - 2 sets - 10 reps  - Shoulder Internal Rotation Reactive Isometrics  - 1 x daily - 7 x weekly - 2 sets - 10 reps  - Shoulder External Rotation Reactive Isometrics  - 1 x daily - 7 x weekly - 2 sets - 10 reps  Access Code: [email or web address]  URL: [email or web address]  Date: 02/12/2024  Prepared by: Rosa M Mcneal     Exercises  - Standing Row with Anchored Resistance  - 1 x daily - 7 x weekly - 2 sets - 10 reps  - Shoulder Extension with Resistance - Palms Forward  - 1 x daily - 7 x weekly - 2 sets - 10 reps  Access Code: [email or web address]  URL: [email or web address]  Date: 01/26/2024  Prepared by: Michelle Cao     Exercises  - Seated Shoulder Flexion AAROM with Pulley Behind  - 1 x daily - 7 x weekly - 2 sets - 10 reps - 3-5 hold  - Seated Shoulder Abduction AAROM with Pulley Behind  - 1 x daily - 7 x weekly - 2 sets - 10 reps - 3-5 hold  - Shoulder Flexion Wall Slide with Towel  - 1 x daily - 7 x weekly - 1 sets - 10 reps - 5-10 hold  - Supine Shoulder External Rotation with Dowel  - 1 x daily - 7 x weekly - 1 sets - 10 reps  - Supine Shoulder Flexion Extension AAROM with Dowel  - 1 x daily - 7 x weekly - 1 sets - 10 reps  - Supine Shoulder Press with Dowel  - 1 x daily - 7 x weekly - 1 sets - 10 reps  - Supine Shoulder Abduction AAROM with " "Dowel  - 1 x daily - 7 x weekly - 1 sets - 10 reps  - Standing Isometric Shoulder Abduction with Doorway - Arm Bent  - 1 x daily - 7 x weekly - 2 sets - 10 reps - 3-5 hold  - Isometric Shoulder Adduction  - 1 x daily - 7 x weekly - 2 sets - 10 reps - 3-5 hold  - Standing Isometric Shoulder Flexion with Doorway - Arm Bent  - 1 x daily - 7 x weekly - 2 sets - 10 reps - 3-5 hold  - Standing Isometric Shoulder Extension with Doorway - Arm Bent  - 1 x daily - 7 x weekly - 2 sets - 10 reps - 3-5 hold       Goals:  Active       PT Problem       PT Goal 1       Start:  01/17/24    Expected End:  04/16/24       1. Independent HEP to allow for 50% reduction in max ADL C/C sx ( 5/10) 2-3wks  2. 0/10 night time sx to allow for uninterrupted sleep x 1wk ( 7/7) 2-3wks  3. Survey score improvement from 36% to 25% (QDI) 3-4wks  4. ROM increase to allow for improved ADL reaching (from 85 to 135 active elevation) 3-4wks  5. Strength increase to allow for improved ADL object handling (from 4-/5 to 4+/5 R shldr) 3-4wks         PT Goal 2       Start:  01/17/24    Expected End:  04/16/24       1.\"I want to use my arm normally again\".            "

## 2024-02-21 ENCOUNTER — TREATMENT (OUTPATIENT)
Dept: PHYSICAL THERAPY | Facility: CLINIC | Age: 74
End: 2024-02-21
Payer: MEDICARE

## 2024-02-21 DIAGNOSIS — M75.41 IMPINGEMENT SYNDROME OF RIGHT SHOULDER: ICD-10-CM

## 2024-02-21 PROCEDURE — 97140 MANUAL THERAPY 1/> REGIONS: CPT | Mod: GP,CQ

## 2024-02-21 PROCEDURE — 97110 THERAPEUTIC EXERCISES: CPT | Mod: GP,CQ

## 2024-02-21 ASSESSMENT — PAIN SCALES - GENERAL: PAINLEVEL_OUTOF10: 2

## 2024-02-21 ASSESSMENT — PAIN - FUNCTIONAL ASSESSMENT: PAIN_FUNCTIONAL_ASSESSMENT: 0-10

## 2024-02-21 NOTE — PROGRESS NOTES
"Physical Therapy Treatment    Patient Name: Shona Hansen  MRN: [id number]  Today's Date: 2/21/2024  Time Calculation  Start Time: 0700  Stop Time: 0745  Time Calculation (min): 45 min  PT Therapeutic Procedures Time Entry  Manual Therapy Time Entry: 12  Therapeutic Exercise Time Entry: 31       Assessment:   Patient identified by name and date of birth. Patient reported increased Sx with certain movements throughout treatment this date, attempted to adjust range to decrease with Sx. She presented with muscle tension in bicep and UT this date that responded well with STW.     Plan:  OP PT Plan  Treatment/Interventions: Cryotherapy, Dry needling, Education/ Instruction, Electrical stimulation, Hot pack, Manual therapy, Self care/ home management, Therapeutic exercises (IASTM/cupping)  PT Plan: Skilled PT  Duration: 4wks  Onset Date: 09/21/23  Certification Period Start Date: 01/17/24  Certification Period End Date: 04/16/24  Rehab Potential: Good  Plan of Care Agreement: Patient  Patient has re-check with PT next visit.   Current Problem  Problem List Items Addressed This Visit             ICD-10-CM    Impingement syndrome of right shoulder M75.41       Subjective Patient reported compliance with HEP 65%  every day verbalized understanding.  She reported she has been experiencing increased foot and shoulder pain the past few days, she reported she can't think of anything else that she might have done to increase her Sx. Patient reported 2/10 pain after treatment and stated \"it's just kind of aching\"   General  Reason for Referral: R shldr impingement  Referred By: Marianne Peck CNP  General Comment: Visit 8  Precautions  Precautions  Precautions Comment: none, per patient she had blader surgery 01/22/24 she reported she is not alowed to lift untill she see the doctor 02/02/24    Pain  Pain Assessment: 0-10  Pain Score: 2  Pain Location: Shoulder  Pain Orientation: Right     Treatments:  Therapeutic Exercise  Therapeutic " "Exercise Performed: Yes  Pulley 2' fwd 2' scap  Wall slide x10 5\" hold  Wall flex with bolster 2 x 10 (X)  Ball on wall 2x 10   - flex   - Med/lat x 10  Resisted Rows 2 x 10  Shoulder seated flex 2x10   Shoulder seated abd 2x10   Shoulder iso with t-band (P) orange band 2 steps  -Flex/ext/abd/add x10 3\" hold   Supine cane   -flex 2 x 10   -press up 2 x 10   -ER 2 x 10   -abd 2 x 10   Supine abc 1 cycle (X)  Supine protraction 2 x 10 (N)     Manual Therapy  Manual Therapy Performed: Yes    PROM and STW to R UE    OP EDUCATION:   Access Code: [email or web address]  URL: [email or web address]  Date: 02/16/2024  Prepared by: Michelle Cao     Exercises  - Standing Shoulder Flexion Reactive Isometric  - 1 x daily - 7 x weekly - 2 sets - 10 reps  - Standing Shoulder Row Reactive Isometric  - 1 x daily - 7 x weekly - 2 sets - 10 reps  - Shoulder Internal Rotation Reactive Isometrics  - 1 x daily - 7 x weekly - 2 sets - 10 reps  - Shoulder External Rotation Reactive Isometrics  - 1 x daily - 7 x weekly - 2 sets - 10 reps  Access Code: [email or web address]  URL: [email or web address]  Date: 02/12/2024  Prepared by: Rosa M Mcneal     Exercises  - Standing Row with Anchored Resistance  - 1 x daily - 7 x weekly - 2 sets - 10 reps  - Shoulder Extension with Resistance - Palms Forward  - 1 x daily - 7 x weekly - 2 sets - 10 reps  Access Code: [email or web address]  URL: [email or web address]  Date: 01/26/2024  Prepared by: Michelle Cao     Exercises  - Seated Shoulder Flexion AAROM with Pulley Behind  - 1 x daily - 7 x weekly - 2 sets - 10 reps - 3-5 hold  - Seated Shoulder Abduction AAROM with Pulley Behind  - 1 x daily - 7 x weekly - 2 sets - 10 reps - 3-5 hold  - Shoulder Flexion Wall Slide with Towel  - 1 x daily - 7 x weekly - 1 sets - 10 reps - 5-10 hold  - Supine Shoulder External Rotation with Dowel  - 1 x daily - 7 x weekly - 1 sets - 10 reps  - Supine Shoulder Flexion Extension AAROM " "with Dowel  - 1 x daily - 7 x weekly - 1 sets - 10 reps  - Supine Shoulder Press with Dowel  - 1 x daily - 7 x weekly - 1 sets - 10 reps  - Supine Shoulder Abduction AAROM with Dowel  - 1 x daily - 7 x weekly - 1 sets - 10 reps  - Standing Isometric Shoulder Abduction with Doorway - Arm Bent  - 1 x daily - 7 x weekly - 2 sets - 10 reps - 3-5 hold  - Isometric Shoulder Adduction  - 1 x daily - 7 x weekly - 2 sets - 10 reps - 3-5 hold  - Standing Isometric Shoulder Flexion with Doorway - Arm Bent  - 1 x daily - 7 x weekly - 2 sets - 10 reps - 3-5 hold  - Standing Isometric Shoulder Extension with Doorway - Arm Bent  - 1 x daily - 7 x weekly - 2 sets - 10 reps - 3-5 hold       Goals:  Active       PT Problem       PT Goal 1       Start:  01/17/24    Expected End:  04/16/24       1. Independent HEP to allow for 50% reduction in max ADL C/C sx ( 5/10) 2-3wks  2. 0/10 night time sx to allow for uninterrupted sleep x 1wk ( 7/7) 2-3wks  3. Survey score improvement from 36% to 25% (QDI) 3-4wks  4. ROM increase to allow for improved ADL reaching (from 85 to 135 active elevation) 3-4wks  5. Strength increase to allow for improved ADL object handling (from 4-/5 to 4+/5 R shldr) 3-4wks         PT Goal 2       Start:  01/17/24    Expected End:  04/16/24       1.\"I want to use my arm normally again\".            "

## 2024-02-23 ENCOUNTER — TREATMENT (OUTPATIENT)
Dept: PHYSICAL THERAPY | Facility: CLINIC | Age: 74
End: 2024-02-23
Payer: MEDICARE

## 2024-02-23 DIAGNOSIS — M75.41 IMPINGEMENT SYNDROME OF RIGHT SHOULDER: ICD-10-CM

## 2024-02-23 PROCEDURE — 97110 THERAPEUTIC EXERCISES: CPT | Mod: GP

## 2024-02-23 ASSESSMENT — PAIN SCALES - GENERAL: PAINLEVEL_OUTOF10: 2

## 2024-02-23 ASSESSMENT — PAIN - FUNCTIONAL ASSESSMENT: PAIN_FUNCTIONAL_ASSESSMENT: 0-10

## 2024-02-23 NOTE — PROGRESS NOTES
"Physical Therapy    Physical Therapy Treatment    Patient Name: Shona Hansen  MRN: [id number]  Today's Date: 2/23/2024  Time Calculation  Start Time: 0915  Stop Time: 1030  Time Calculation (min): 75 min     PT Therapeutic Procedures Time Entry  Therapeutic Exercise Time Entry: 40                   Current Problem  Problem List Items Addressed This Visit             ICD-10-CM    Impingement syndrome of right shoulder M75.41    Relevant Orders    Follow Up In Physical Therapy        General  Reason for Referral: R shldr impingement  Referred By: Marianne Peck CNP  General Comment: Visit 9    Subjective   Patient reports that things have significantly improved since starting therapy, she could barely move her arm when she first started therapy and now she can move it. She does note she had a set back last week-she is uncertain of why, she had an episode where she found it hard to move her arm but she does not recall anything that would have flared it up besides moving some items in an office. She also reports the pain overall has also improved and she can use her arm more than before. Cannot clasp her bra.    Precautions  Precautions  Precautions Comment: none, per patient she had blader surgery 01/22/24 she reported she is not alowed to lift untill she see the doctor 02/02/24=cleared to lift now    Pain  Pain Assessment: 0-10  Pain Score: 2  Pain Location: Shoulder  Pain Orientation: Right    Objective   PROM  R ER 86 deg  R IR 74 deg  R flex 175 deg  R abd 175 deg    AROM seated  Flex 160 deg  Abd 115 deg  ER T3  IR iliac crest    Strength:  Flex: 4-/5  Abd:4/5  ER: 4-/5  IR: 4/5    Outcome Measures:  Other Measures  Other Outcome Measures: QuickDash 25%     Treatments:  Therapeutic Exercise  Therapeutic Exercise Performed: Yes  Pulley 2' fwd 2' scap  Wall slide x10 5\" hold  Wall flex with bolster 2 x 10 (X)  Ball on wall 2x 10   - flex   - Med/lat x 10   -CW/CCW x10 ea  Resisted Rows 2 x 10 pink tube  Uni shoulder " "ext orange band x 10 (N)  Shoulder seated flex 2x10   Shoulder seated abd 2x10   Shoulder iso with t-band (P) orange band 2 steps  -Flex/ext/abd/add x10 3\" hold   Supine cane   -flex 2 x 10   -press up 2 x 10   -ER 2 x 10   -abd 2 x 10   Supine abc 1 cycle (X)  Supine protraction 2 x 10 (N)     Manual Therapy  Manual Therapy Performed: Yes    PROM and STW to R UE      OP EDUCATION:   Access Code: [email or web address]  URL: [email or web address]/  Date: 02/16/2024  Prepared by: Michelle Cao     Exercises  - Standing Shoulder Flexion Reactive Isometric  - 1 x daily - 7 x weekly - 2 sets - 10 reps  - Standing Shoulder Row Reactive Isometric  - 1 x daily - 7 x weekly - 2 sets - 10 reps  - Shoulder Internal Rotation Reactive Isometrics  - 1 x daily - 7 x weekly - 2 sets - 10 reps  - Shoulder External Rotation Reactive Isometrics  - 1 x daily - 7 x weekly - 2 sets - 10 reps  Access Code: [email or web address]  URL: https://[employer]/  Date: 02/12/2024  Prepared by: Rosa M Mcneal     Exercises  - Standing Row with Anchored Resistance  - 1 x daily - 7 x weekly - 2 sets - 10 reps  - Shoulder Extension with Resistance - Palms Forward  - 1 x daily - 7 x weekly - 2 sets - 10 reps  Access Code: [email or web address]  URL: [email or web address]/  Date: 01/26/2024  Prepared by: Michelle Cao     Exercises  - Seated Shoulder Flexion AAROM with Pulley Behind  - 1 x daily - 7 x weekly - 2 sets - 10 reps - 3-5 hold  - Seated Shoulder Abduction AAROM with Pulley Behind  - 1 x daily - 7 x weekly - 2 sets - 10 reps - 3-5 hold  - Shoulder Flexion Wall Slide with Towel  - 1 x daily - 7 x weekly - 1 sets - 10 reps - 5-10 hold  - Supine Shoulder External Rotation with Dowel  - 1 x daily - 7 x weekly - 1 sets - 10 reps  - Supine Shoulder Flexion Extension AAROM with Dowel  - 1 x daily - 7 x weekly - 1 sets - 10 reps  - Supine Shoulder Press with Dowel  - 1 x daily - 7 x weekly - 1 sets - 10 reps  - Supine " Shoulder Abduction AAROM with Dowel  - 1 x daily - 7 x weekly - 1 sets - 10 reps  - Standing Isometric Shoulder Abduction with Doorway - Arm Bent  - 1 x daily - 7 x weekly - 2 sets - 10 reps - 3-5 hold  - Isometric Shoulder Adduction  - 1 x daily - 7 x weekly - 2 sets - 10 reps - 3-5 hold  - Standing Isometric Shoulder Flexion with Doorway - Arm Bent  - 1 x daily - 7 x weekly - 2 sets - 10 reps - 3-5 hold  - Standing Isometric Shoulder Extension with Doorway - Arm Bent  - 1 x daily - 7 x weekly - 2 sets - 10 reps - 3-5 hold     OP Education      Assessment  Patient verified by name and .  PT Assessment Results: Decreased strength, Decreased range of motion, Pain  Rehab Prognosis: GoodPatient making good goal directed progress as her PROM and AROM have now improved to functional levels, strength is beginning to improve and pain levels are more tolerable. Patient continues to have trigger points and point tenderness to palpation and pain along biciptial groove and over biceps tendon/muscle belly. Patient continues to demonstrate weakness and pain with reaching/lifting which is interfering with her ADL's and functional use of RUE. Patient would benefit from continued skilled PT to improve pain with reaching and strength to allow for more functional use of RUE.    Plan  Treatment/Interventions: Cryotherapy, Dry needling, Education/ Instruction, Electrical stimulation, Hot pack, Manual therapy, Self care/ home management, Therapeutic exercises (IASTM/cupping)  PT Plan: Skilled PT  PT Frequency: 2 times per week  Duration: 4wks  Onset Date: 23  Certification Period Start Date: 24  Certification Period End Date: 24  Rehab Potential: Good  Plan of Care Agreement: Patient  Continue with progression of end range ROM, strengthening, modalities (cupping and IASTM/probe to biceps) for pain relief to allow for reaching/lifting without pain. Progress towards goals updated.  Active       PT Problem       PT  "Goal 1 (Progressing)       Start:  01/17/24    Expected End:  04/16/24       1. Independent HEP to allow for 50% reduction in max ADL C/C sx ( 5/10) 2-3wks  2/23/24: Reports at least 50% reduction in ADL issues  2. 0/10 night time sx to allow for uninterrupted sleep x 1wk ( 7/7) 2-3wks  2/23/24: 2/10 avg pain, waking 2/7 nights per week  3. Survey score improvement from 36% to 25% (QDI) 3-4wks  2/23/24: QuickDash score 25%  4. ROM increase to allow for improved ADL reaching (from 85 to 135 active elevation) 3-4wks  2/23/24: 160 deg flex, 115 abd but still has pain  5. Strength increase to allow for improved ADL object handling (from 4-/5 to 4+/5 R shldr) 3-4wks         PT Goal 2 (Progressing)       Start:  01/17/24    Expected End:  04/16/24       1.\"I want to use my arm normally again\".           "

## 2024-03-08 ENCOUNTER — TREATMENT (OUTPATIENT)
Dept: PHYSICAL THERAPY | Facility: CLINIC | Age: 74
End: 2024-03-08
Payer: MEDICARE

## 2024-03-08 DIAGNOSIS — M75.41 IMPINGEMENT SYNDROME OF RIGHT SHOULDER: ICD-10-CM

## 2024-03-08 PROCEDURE — 97110 THERAPEUTIC EXERCISES: CPT | Mod: GP,CQ

## 2024-03-08 PROCEDURE — 97140 MANUAL THERAPY 1/> REGIONS: CPT | Mod: GP,CQ

## 2024-03-08 ASSESSMENT — PAIN - FUNCTIONAL ASSESSMENT: PAIN_FUNCTIONAL_ASSESSMENT: 0-10

## 2024-03-08 ASSESSMENT — PAIN SCALES - GENERAL: PAINLEVEL_OUTOF10: 1

## 2024-03-08 NOTE — PROGRESS NOTES
"Physical Therapy Treatment    Patient Name: Shona Hansen  MRN: [id number]  Today's Date: 3/8/2024  Time Calculation  Start Time: 0914  Stop Time: 0957  Time Calculation (min): 43 min  PT Therapeutic Procedures Time Entry  Manual Therapy Time Entry: 13  Therapeutic Exercise Time Entry: 28       Assessment:   Patient identified by name and date of birth. Patient demonstrated fair tolerance with treatment this date. She verbalized she experienced \"popping\" with start of ball on the wall with discomfort noted. Added cupping this date she demonstrated fair tolerance.     Plan:  OP PT Plan  Treatment/Interventions: Cryotherapy, Dry needling, Education/ Instruction, Electrical stimulation, Hot pack, Manual therapy, Self care/ home management, Therapeutic exercises (IASTM/cupping)  PT Plan: Skilled PT  PT Frequency: 2 times per week  Duration: 4wks  Onset Date: 09/21/23  Certification Period Start Date: 02/23/24  Certification Period End Date: 05/23/24  Rehab Potential: Good  Plan of Care Agreement: Patient  Continue with progression of ROM and manual for increased ease with ADL's.   Current Problem  Problem List Items Addressed This Visit             ICD-10-CM    Impingement syndrome of right shoulder M75.41       Subjective Patient reported compliance with HEP and verbalized understanding.  Patient reported she was able to unhook her bra since her last visit. She reported 1/10 pain after treatment.  General  Reason for Referral: R shldr impingement  Referred By: Marianne Peck CNP  General Comment: Visit 10, 1/ POC  Precautions  Precautions  Precautions Comment: none, per patient she had blader surgery 01/22/24 she reported she is not alowed to lift untill she see the doctor 02/02/24=cleared to lift now    Pain  Pain Assessment: 0-10  Pain Score: 1  Pain Location: Shoulder  Pain Orientation: Right     Treatments:  Therapeutic Exercise  Therapeutic Exercise Performed: Yes  UBE 3' fwd 3' bwd (N)  Ball on wall 2x 10    - flex   " "- Med/lat x 10   -CW/CCW x10 ea  Resisted Rows 2 x 10 pink tube  Uni shoulder ext orange band x 10 (N)  Shoulder seated flex 2x10   Shoulder seated abd 2x10   Shoulder iso with t-band (P) orange band 2 steps  -Flex/ext/abd/add x10 3\" hold   Supine protraction 2 x 10 (X)  IR Strap stretch x10 10\" hold (N)  1/2 foam roll (A)    Pulley 2' fwd 2' scap (X)  Wall slide x10 5\" hold (X)  Wall flex with bolster 2 x 10 (X)  Supine cane (X)  -flex 2 x 10   -press up 2 x 10   -ER 2 x 10   -abd 2 x 10   Supine abc 1 cycle (X)  Manual Therapy  Manual Therapy Performed: Yes   PROM and STW to R UE     Cupping to upper R UE bicep area static and dynamic movements (N)  OP EDUCATION:   Access Code: [email or web address]  URL: [email or web address]  Date: 02/16/2024  Prepared by: Michelle Cao     Exercises  - Standing Shoulder Flexion Reactive Isometric  - 1 x daily - 7 x weekly - 2 sets - 10 reps  - Standing Shoulder Row Reactive Isometric  - 1 x daily - 7 x weekly - 2 sets - 10 reps  - Shoulder Internal Rotation Reactive Isometrics  - 1 x daily - 7 x weekly - 2 sets - 10 reps  - Shoulder External Rotation Reactive Isometrics  - 1 x daily - 7 x weekly - 2 sets - 10 reps  Access Code: [email or web address]  URL: [email or web address]  Date: 02/12/2024  Prepared by: Rosa M Mcneal     Exercises  - Standing Row with Anchored Resistance  - 1 x daily - 7 x weekly - 2 sets - 10 reps  - Shoulder Extension with Resistance - Palms Forward  - 1 x daily - 7 x weekly - 2 sets - 10 reps  Access Code: [email or web address]  URL: [email or web address]  Date: 01/26/2024  Prepared by: Michelle Cao     Exercises  - Seated Shoulder Flexion AAROM with Pulley Behind  - 1 x daily - 7 x weekly - 2 sets - 10 reps - 3-5 hold  - Seated Shoulder Abduction AAROM with Pulley Behind  - 1 x daily - 7 x weekly - 2 sets - 10 reps - 3-5 hold  - Shoulder Flexion Wall Slide with Towel  - 1 x daily - 7 x weekly - 1 sets - 10 reps - 5-10 " "hold  - Supine Shoulder External Rotation with Dowel  - 1 x daily - 7 x weekly - 1 sets - 10 reps  - Supine Shoulder Flexion Extension AAROM with Dowel  - 1 x daily - 7 x weekly - 1 sets - 10 reps  - Supine Shoulder Press with Dowel  - 1 x daily - 7 x weekly - 1 sets - 10 reps  - Supine Shoulder Abduction AAROM with Dowel  - 1 x daily - 7 x weekly - 1 sets - 10 reps  - Standing Isometric Shoulder Abduction with Doorway - Arm Bent  - 1 x daily - 7 x weekly - 2 sets - 10 reps - 3-5 hold  - Isometric Shoulder Adduction  - 1 x daily - 7 x weekly - 2 sets - 10 reps - 3-5 hold  - Standing Isometric Shoulder Flexion with Doorway - Arm Bent  - 1 x daily - 7 x weekly - 2 sets - 10 reps - 3-5 hold  - Standing Isometric Shoulder Extension with Doorway - Arm Bent  - 1 x daily - 7 x weekly - 2 sets - 10 reps - 3-5 hold    Goals:  Active       PT Problem       PT Goal 1 (Progressing)       Start:  01/17/24    Expected End:  04/16/24       1. Independent HEP to allow for 50% reduction in max ADL C/C sx ( 5/10) 2-3wks  2/23/24: Reports at least 50% reduction in ADL issues  2. 0/10 night time sx to allow for uninterrupted sleep x 1wk ( 7/7) 2-3wks  2/23/24: 2/10 avg pain, waking 2/7 nights per week  3. Survey score improvement from 36% to 25% (QDI) 3-4wks  2/23/24: QuickDash score 25%  4. ROM increase to allow for improved ADL reaching (from 85 to 135 active elevation) 3-4wks  2/23/24: 160 deg flex, 115 abd but still has pain  5. Strength increase to allow for improved ADL object handling (from 4-/5 to 4+/5 R shldr) 3-4wks         PT Goal 2 (Progressing)       Start:  01/17/24    Expected End:  04/16/24       1.\"I want to use my arm normally again\".            "

## 2024-03-11 ENCOUNTER — TREATMENT (OUTPATIENT)
Dept: PHYSICAL THERAPY | Facility: CLINIC | Age: 74
End: 2024-03-11
Payer: MEDICARE

## 2024-03-11 DIAGNOSIS — M75.41 IMPINGEMENT SYNDROME OF RIGHT SHOULDER: ICD-10-CM

## 2024-03-11 PROCEDURE — 97110 THERAPEUTIC EXERCISES: CPT | Mod: GP,CQ

## 2024-03-11 PROCEDURE — 97140 MANUAL THERAPY 1/> REGIONS: CPT | Mod: GP,CQ

## 2024-03-11 ASSESSMENT — PAIN SCALES - GENERAL: PAINLEVEL_OUTOF10: 1

## 2024-03-11 ASSESSMENT — PAIN - FUNCTIONAL ASSESSMENT: PAIN_FUNCTIONAL_ASSESSMENT: 0-10

## 2024-03-11 NOTE — PROGRESS NOTES
Physical Therapy Treatment    Patient Name: Shona Hansen  MRN: [id number]  Today's Date: 3/11/2024  Time Calculation  Start Time: 1132  Stop Time: 1213  Time Calculation (min): 41 min  PT Therapeutic Procedures Time Entry  Manual Therapy Time Entry: 15  Therapeutic Exercise Time Entry: 25        General:  General  Reason for Referral: R shldr impingement  Referred By: Marianne Peck CNP  General Comment: Visit 10, 2/ POC    Assessment:Patient identified by name and date of birth.      Tenderness along the bicep muscle belly and along the insertion site.  Completed cupping and IASTM this date with decreased tightness afterward.   Dynamic cupping and static cupping was completed with decreased spasms in distal bicep.     Plan:  Plan to continue with progression of STW including IASTM and cupping. JA  OP PT Plan  Treatment/Interventions: Cryotherapy, Dry needling, Education/ Instruction, Electrical stimulation, Hot pack, Manual therapy, Self care/ home management, Therapeutic exercises (IASTM/cupping)  PT Plan: Skilled PT  PT Frequency: 2 times per week  Duration: 4wks  Onset Date: 09/21/23  Certification Period Start Date: 02/23/24  Certification Period End Date: 05/23/24  Rehab Potential: Good  Plan of Care Agreement: Patient    Current Problem  Problem List Items Addressed This Visit             ICD-10-CM    Impingement syndrome of right shoulder M75.41       Subjective Patient is compliant with HEP completion with no issues to report at this time.  States that she did have decreased symptoms after last visit.     Precautions  Precautions  Precautions Comment: none, per patient she had blader surgery 01/22/24 she reported she is not alowed to lift untill she see the doctor 02/02/24=cleared to lift now    Pain  Pain Assessment: 0-10  Pain Score: 1  Pain Location: Shoulder  Pain Orientation: Right  Pain Radiating Towards: Bicep    Treatments:  Therapeutic Exercise Performed: Yes  UBE 3' fwd 3' bwd (N)  Ball on wall 2x 10  "   - flex   - Med/lat x 10   -CW/CCW x10 ea  Resisted Rows 2 x 10 pink tube  Uni shoulder ext orange band x 10   Shoulder seated flex 2x10   Shoulder seated abd 2x10   Shoulder iso with t-band (P) orange band 2 steps  -Flex/ext/abd/add x10 3\" hold   Supine protraction 2 x 10 (X)  IR Strap stretch x10 10\" hold (N)  1/2 foam roll (A)     Pulley 2' fwd 2' scap (X)  Wall slide x10 5\" hold (X)  Wall flex with bolster 2 x 10 (X)  Supine cane (X)  -flex 2 x 10   -press up 2 x 10   -ER 2 x 10   -abd 2 x 10   Supine abc 1 cycle (X)      Manual Therapy  Manual Therapy Performed: Yes   PROM and STW to R UE   (X)   Cupping to upper R UE bicep area static and dynamic movements (N)    OP EDUCATION:   Access Code: [email or web address]  URL: [email or web address]  Date: 02/16/2024  Prepared by: Michelle Cao     Exercises  - Standing Shoulder Flexion Reactive Isometric  - 1 x daily - 7 x weekly - 2 sets - 10 reps  - Standing Shoulder Row Reactive Isometric  - 1 x daily - 7 x weekly - 2 sets - 10 reps  - Shoulder Internal Rotation Reactive Isometrics  - 1 x daily - 7 x weekly - 2 sets - 10 reps  - Shoulder External Rotation Reactive Isometrics  - 1 x daily - 7 x weekly - 2 sets - 10 reps  Access Code: [email or web address]  URL: [email or web address]  Date: 02/12/2024  Prepared by: Rosa M Mcneal     Exercises  - Standing Row with Anchored Resistance  - 1 x daily - 7 x weekly - 2 sets - 10 reps  - Shoulder Extension with Resistance - Palms Forward  - 1 x daily - 7 x weekly - 2 sets - 10 reps  Access Code: [email or web address]  URL: [email or web address]  Date: 01/26/2024  Prepared by: Michelle Cao     Exercises  - Seated Shoulder Flexion AAROM with Pulley Behind  - 1 x daily - 7 x weekly - 2 sets - 10 reps - 3-5 hold  - Seated Shoulder Abduction AAROM with Pulley Behind  - 1 x daily - 7 x weekly - 2 sets - 10 reps - 3-5 hold  - Shoulder Flexion Wall Slide with Towel  - 1 x daily - 7 x weekly - 1 " "sets - 10 reps - 5-10 hold  - Supine Shoulder External Rotation with Dowel  - 1 x daily - 7 x weekly - 1 sets - 10 reps  - Supine Shoulder Flexion Extension AAROM with Dowel  - 1 x daily - 7 x weekly - 1 sets - 10 reps  - Supine Shoulder Press with Dowel  - 1 x daily - 7 x weekly - 1 sets - 10 reps  - Supine Shoulder Abduction AAROM with Dowel  - 1 x daily - 7 x weekly - 1 sets - 10 reps  - Standing Isometric Shoulder Abduction with Doorway - Arm Bent  - 1 x daily - 7 x weekly - 2 sets - 10 reps - 3-5 hold  - Isometric Shoulder Adduction  - 1 x daily - 7 x weekly - 2 sets - 10 reps - 3-5 hold  - Standing Isometric Shoulder Flexion with Doorway - Arm Bent  - 1 x daily - 7 x weekly - 2 sets - 10 reps - 3-5 hold  - Standing Isometric Shoulder Extension with Doorway - Arm Bent  - 1 x daily - 7 x weekly - 2 sets - 10 reps - 3-5 hold    Goals:  Active       PT Problem       PT Goal 1 (Progressing)       Start:  01/17/24    Expected End:  04/16/24       1. Independent HEP to allow for 50% reduction in max ADL C/C sx ( 5/10) 2-3wks  2/23/24: Reports at least 50% reduction in ADL issues  2. 0/10 night time sx to allow for uninterrupted sleep x 1wk ( 7/7) 2-3wks  2/23/24: 2/10 avg pain, waking 2/7 nights per week  3. Survey score improvement from 36% to 25% (QDI) 3-4wks  2/23/24: QuickDash score 25%  4. ROM increase to allow for improved ADL reaching (from 85 to 135 active elevation) 3-4wks  2/23/24: 160 deg flex, 115 abd but still has pain  5. Strength increase to allow for improved ADL object handling (from 4-/5 to 4+/5 R shldr) 3-4wks         PT Goal 2 (Progressing)       Start:  01/17/24    Expected End:  04/16/24       1.\"I want to use my arm normally again\".            "

## 2024-03-13 ENCOUNTER — TREATMENT (OUTPATIENT)
Dept: PHYSICAL THERAPY | Facility: CLINIC | Age: 74
End: 2024-03-13
Payer: MEDICARE

## 2024-03-13 DIAGNOSIS — M75.41 IMPINGEMENT SYNDROME OF RIGHT SHOULDER: ICD-10-CM

## 2024-03-13 PROCEDURE — 97110 THERAPEUTIC EXERCISES: CPT | Mod: GP,CQ

## 2024-03-13 ASSESSMENT — PAIN - FUNCTIONAL ASSESSMENT: PAIN_FUNCTIONAL_ASSESSMENT: 0-10

## 2024-03-13 ASSESSMENT — PAIN SCALES - GENERAL: PAINLEVEL_OUTOF10: 2

## 2024-03-13 NOTE — PROGRESS NOTES
Physical Therapy Treatment    Patient Name: Shona Hansen  MRN: [id number]  Today's Date: 3/13/2024  Time Calculation  Start Time: 0920  Stop Time: 1000  Time Calculation (min): 40 min  PT Therapeutic Procedures Time Entry  Therapeutic Exercise Time Entry: 38        General:  General  Reason for Referral: R shldr impingement  Referred By: Marianne Peck CNP  General Comment: Visit 10, 3/ POC    Assessment:Patient identified by name and date of birth.      Patient has mild bruising along mid upper arm from cupping last visit, so did not cup in that area today.   Added foam roll stretches this date with relief with the backstroke motion.  Applied KT tape this date with education provided on how the tape with help stabilize the shoulder.     Plan:  Plan to progress shoulder strength and continue with STW. ARACELI    OP PT Plan  Treatment/Interventions: Cryotherapy, Dry needling, Education/ Instruction, Electrical stimulation, Hot pack, Manual therapy, Self care/ home management, Therapeutic exercises (IASTM/cupping)  PT Plan: Skilled PT  PT Frequency: 2 times per week  Duration: 4wks  Onset Date: 09/21/23  Certification Period Start Date: 02/23/24  Certification Period End Date: 05/23/24  Rehab Potential: Good  Plan of Care Agreement: Patient    Current Problem  Problem List Items Addressed This Visit             ICD-10-CM    Impingement syndrome of right shoulder M75.41       Subjective Patient is compliant with HEP completion with no issues to report at this time.  Patient reports that she had bruises after the cupping last visit.     Precautions  Precautions  Precautions Comment: none, per patient she had blader surgery 01/22/24 she reported she is not alowed to lift untill she see the doctor 02/02/24=cleared to lift now    Pain  Pain Assessment: 0-10  Pain Score: 2  Pain Location: Shoulder  Pain Orientation: Right  Pain Radiating Towards: Bicep and top of shoulder    Treatments:  Therapeutic Exercise Performed: Yes  UBE 3'  "fwd 3' bwd  Ball on wall 2x 10    - flex   - Med/lat x 10   -CW/CCW x10 ea  Resisted Rows 2 x 10 pink tube  Uni shoulder ext orange band 2 x 10   Shoulder seated flex 2 x 10   Shoulder seated abd 2 x 10   Shoulder iso with t-band orange band 2 steps 2 x 10 (N)  -Flex/ext/abd/add x10 3\" hold   Supine protraction 2 x 10 (X)  IR Strap stretch x10 10\" hold   1/2 foam roll (N)  - pec stretch  - hugs  - backstroke   Applied KT to bicep and shoulder today 1 Y strip on bicep and 1 strip on shoulder (N)   Pulley 2' fwd 2' scap   Wall slide x10 5\" hold (X)  Wall flex with bolster 2 x 10 (X)  Supine cane (X)  -flex 2 x 10   -press up 2 x 10   -ER 2 x 10   -abd 2 x 10   Supine abc 1 cycle (X)        Manual Therapy  Manual Therapy Performed: Yes  PROM and STW to R UE   (X)   Cupping to upper R UE bicep area static and dynamic movements       OP EDUCATION:   Access Code: [email or web address]  URL: [email or web address]  Date: 02/16/2024  Prepared by: Michelle Cao     Exercises  - Standing Shoulder Flexion Reactive Isometric  - 1 x daily - 7 x weekly - 2 sets - 10 reps  - Standing Shoulder Row Reactive Isometric  - 1 x daily - 7 x weekly - 2 sets - 10 reps  - Shoulder Internal Rotation Reactive Isometrics  - 1 x daily - 7 x weekly - 2 sets - 10 reps  - Shoulder External Rotation Reactive Isometrics  - 1 x daily - 7 x weekly - 2 sets - 10 reps  Access Code: [email or web address]  URL: [email or web address]  Date: 02/12/2024  Prepared by: Rosa M Mcneal     Exercises  - Standing Row with Anchored Resistance  - 1 x daily - 7 x weekly - 2 sets - 10 reps  - Shoulder Extension with Resistance - Palms Forward  - 1 x daily - 7 x weekly - 2 sets - 10 reps  Access Code: [email or web address]  URL: [email or web address]  Date: 01/26/2024  Prepared by: Michelle Cao     Exercises  - Seated Shoulder Flexion AAROM with Pulley Behind  - 1 x daily - 7 x weekly - 2 sets - 10 reps - 3-5 hold  - Seated Shoulder " "Abduction AAROM with Pulley Behind  - 1 x daily - 7 x weekly - 2 sets - 10 reps - 3-5 hold  - Shoulder Flexion Wall Slide with Towel  - 1 x daily - 7 x weekly - 1 sets - 10 reps - 5-10 hold  - Supine Shoulder External Rotation with Dowel  - 1 x daily - 7 x weekly - 1 sets - 10 reps  - Supine Shoulder Flexion Extension AAROM with Dowel  - 1 x daily - 7 x weekly - 1 sets - 10 reps  - Supine Shoulder Press with Dowel  - 1 x daily - 7 x weekly - 1 sets - 10 reps  - Supine Shoulder Abduction AAROM with Dowel  - 1 x daily - 7 x weekly - 1 sets - 10 reps  - Standing Isometric Shoulder Abduction with Doorway - Arm Bent  - 1 x daily - 7 x weekly - 2 sets - 10 reps - 3-5 hold  - Isometric Shoulder Adduction  - 1 x daily - 7 x weekly - 2 sets - 10 reps - 3-5 hold  - Standing Isometric Shoulder Flexion with Doorway - Arm Bent  - 1 x daily - 7 x weekly - 2 sets - 10 reps - 3-5 hold  - Standing Isometric Shoulder Extension with Doorway - Arm Bent  - 1 x daily - 7 x weekly - 2 sets - 10 reps - 3-5 hold    Goals:  Active       PT Problem       PT Goal 1 (Progressing)       Start:  01/17/24    Expected End:  04/16/24       1. Independent HEP to allow for 50% reduction in max ADL C/C sx ( 5/10) 2-3wks  2/23/24: Reports at least 50% reduction in ADL issues  2. 0/10 night time sx to allow for uninterrupted sleep x 1wk ( 7/7) 2-3wks  2/23/24: 2/10 avg pain, waking 2/7 nights per week  3. Survey score improvement from 36% to 25% (QDI) 3-4wks  2/23/24: QuickDash score 25%  4. ROM increase to allow for improved ADL reaching (from 85 to 135 active elevation) 3-4wks  2/23/24: 160 deg flex, 115 abd but still has pain  5. Strength increase to allow for improved ADL object handling (from 4-/5 to 4+/5 R shldr) 3-4wks         PT Goal 2 (Progressing)       Start:  01/17/24    Expected End:  04/16/24       1.\"I want to use my arm normally again\".            "

## 2024-03-18 ENCOUNTER — TREATMENT (OUTPATIENT)
Dept: PHYSICAL THERAPY | Facility: CLINIC | Age: 74
End: 2024-03-18
Payer: MEDICARE

## 2024-03-18 DIAGNOSIS — M75.41 IMPINGEMENT SYNDROME OF RIGHT SHOULDER: ICD-10-CM

## 2024-03-18 PROCEDURE — 97110 THERAPEUTIC EXERCISES: CPT | Mod: GP,CQ

## 2024-03-18 PROCEDURE — 97140 MANUAL THERAPY 1/> REGIONS: CPT | Mod: GP,CQ

## 2024-03-18 ASSESSMENT — PAIN SCALES - GENERAL: PAINLEVEL_OUTOF10: 1

## 2024-03-18 ASSESSMENT — PAIN - FUNCTIONAL ASSESSMENT: PAIN_FUNCTIONAL_ASSESSMENT: 0-10

## 2024-03-18 NOTE — PROGRESS NOTES
Physical Therapy Treatment    Patient Name: Shona Hansen  MRN: [id number]  Today's Date: 3/18/2024  Time Calculation  Start Time: 1000  Stop Time: 1045  Time Calculation (min): 45 min  PT Therapeutic Procedures Time Entry  Manual Therapy Time Entry: 10  Therapeutic Exercise Time Entry: 30        General:  General  Reason for Referral: R shldr impingement  Referred By: Marianne Peck CNP  General Comment: Visit 10, 4/ POC    Assessment:Patient identified by name and date of birth.      Plan  to add cupping again next visit as bruise continues to fade.  Discussed different positional movements to avoid to try to avoid exacerbating symptoms in the bicep region.   Observed increased anterior humeral glide with active movements.   Will continue with progression of strength exercises next visit.     Plan:  Plan to continue with STW and add strength as able to minimize humeral glide. JA  OP PT Plan  Treatment/Interventions: Cryotherapy, Dry needling, Education/ Instruction, Electrical stimulation, Hot pack, Manual therapy, Self care/ home management, Therapeutic exercises (IASTM/cupping)  PT Plan: Skilled PT  PT Frequency: 2 times per week  Duration: 4wks  Onset Date: 09/21/23  Certification Period Start Date: 02/23/24  Certification Period End Date: 05/23/24  Rehab Potential: Good  Plan of Care Agreement: Patient    Current Problem  Problem List Items Addressed This Visit             ICD-10-CM    Impingement syndrome of right shoulder M75.41       Subjective Patient is compliant with HEP completion with no issues to report at this time.  Patient states that the KT tape did help. She took it off Saturday and went to the movies twice. Notes the height of the chair arms really bothered her. Hold off on taping today and will tape on Wednesday again per patient.      Precautions  Precautions  Precautions Comment: none, per patient she had blader surgery 01/22/24 she reported she is not alowed to lift untill she see the doctor  "02/02/24=cleared to lift now    Pain  Pain Assessment: 0-10  Pain Score: 1  Pain Location: Shoulder  Pain Orientation: Right  Pain Radiating Towards: bicep tendon and muscle belly    Treatments:  Therapeutic Exercise Performed: Yes  UBE 3' fwd 3' bwd  Ball on wall 2x 10    - flex   - Med/lat x 10   -CW/CCW x10 ea  S/L ABD (A)   S/L Flex (A)   Resisted Rows 2 x 10 pink tube  Uni shoulder ext orange band 2 x 10   Shoulder seated flex 2 x 10   Shoulder seated abd 2 x 10   Shoulder iso with t-band orange band 2 steps 2 x 10 (N)  -Flex/ext/abd/add x10 3\" hold   Supine protraction 2 x 10 (X)  IR Strap stretch x10 10\" hold   1/2 foam roll (N)  - pec stretch  - hugs  - backstroke   Applied KT to bicep and shoulder today 1 Y strip on bicep and 1 strip on shoulder (X)   Pulley 2' fwd 2' scap       Wall slide x10 5\" hold (X)  Wall flex with bolster 2 x 10 (X)  Supine cane (X)  -flex 2 x 10   -press up 2 x 10   -ER 2 x 10   -abd 2 x 10   Supine abc 1 cycle (X)        Manual Therapy  Manual Therapy Performed: Yes  PROM and STW to R UE   (X)   Cupping to upper R UE bicep area static and dynamic movements     OP EDUCATION:   Access Code: [email or web address]  URL: [email or web address]  Date: 02/16/2024  Prepared by: Michelle Cao     Exercises  - Standing Shoulder Flexion Reactive Isometric  - 1 x daily - 7 x weekly - 2 sets - 10 reps  - Standing Shoulder Row Reactive Isometric  - 1 x daily - 7 x weekly - 2 sets - 10 reps  - Shoulder Internal Rotation Reactive Isometrics  - 1 x daily - 7 x weekly - 2 sets - 10 reps  - Shoulder External Rotation Reactive Isometrics  - 1 x daily - 7 x weekly - 2 sets - 10 reps  Access Code: [email or web address]  URL: [email or web address]  Date: 02/12/2024  Prepared by: Rosa M Mcneal     Exercises  - Standing Row with Anchored Resistance  - 1 x daily - 7 x weekly - 2 sets - 10 reps  - Shoulder Extension with Resistance - Palms Forward  - 1 x daily - 7 x weekly - 2 sets - 10 " reps  Access Code: [email or web address]  URL: [email or web address]  Date: 01/26/2024  Prepared by: Michelle Cao     Exercises  - Seated Shoulder Flexion AAROM with Pulley Behind  - 1 x daily - 7 x weekly - 2 sets - 10 reps - 3-5 hold  - Seated Shoulder Abduction AAROM with Pulley Behind  - 1 x daily - 7 x weekly - 2 sets - 10 reps - 3-5 hold  - Shoulder Flexion Wall Slide with Towel  - 1 x daily - 7 x weekly - 1 sets - 10 reps - 5-10 hold  - Supine Shoulder External Rotation with Dowel  - 1 x daily - 7 x weekly - 1 sets - 10 reps  - Supine Shoulder Flexion Extension AAROM with Dowel  - 1 x daily - 7 x weekly - 1 sets - 10 reps  - Supine Shoulder Press with Dowel  - 1 x daily - 7 x weekly - 1 sets - 10 reps  - Supine Shoulder Abduction AAROM with Dowel  - 1 x daily - 7 x weekly - 1 sets - 10 reps  - Standing Isometric Shoulder Abduction with Doorway - Arm Bent  - 1 x daily - 7 x weekly - 2 sets - 10 reps - 3-5 hold  - Isometric Shoulder Adduction  - 1 x daily - 7 x weekly - 2 sets - 10 reps - 3-5 hold  - Standing Isometric Shoulder Flexion with Doorway - Arm Bent  - 1 x daily - 7 x weekly - 2 sets - 10 reps - 3-5 hold  - Standing Isometric Shoulder Extension with Doorway - Arm Bent  - 1 x daily - 7 x weekly - 2 sets - 10 reps - 3-5 hold    Goals:  Active       PT Problem       PT Goal 1 (Progressing)       Start:  01/17/24    Expected End:  04/16/24       1. Independent HEP to allow for 50% reduction in max ADL C/C sx ( 5/10) 2-3wks  2/23/24: Reports at least 50% reduction in ADL issues  2. 0/10 night time sx to allow for uninterrupted sleep x 1wk ( 7/7) 2-3wks  2/23/24: 2/10 avg pain, waking 2/7 nights per week  3. Survey score improvement from 36% to 25% (QDI) 3-4wks  2/23/24: QuickDash score 25%  4. ROM increase to allow for improved ADL reaching (from 85 to 135 active elevation) 3-4wks  2/23/24: 160 deg flex, 115 abd but still has pain  5. Strength increase to allow for improved ADL object handling  "(from 4-/5 to 4+/5 R shldr) 3-4wks         PT Goal 2 (Progressing)       Start:  01/17/24    Expected End:  04/16/24       1.\"I want to use my arm normally again\".            "

## 2024-03-20 ENCOUNTER — TREATMENT (OUTPATIENT)
Dept: PHYSICAL THERAPY | Facility: CLINIC | Age: 74
End: 2024-03-20
Payer: MEDICARE

## 2024-03-20 DIAGNOSIS — M75.41 IMPINGEMENT SYNDROME OF RIGHT SHOULDER: ICD-10-CM

## 2024-03-20 PROCEDURE — 97110 THERAPEUTIC EXERCISES: CPT | Mod: GP,CQ

## 2024-03-20 PROCEDURE — 97140 MANUAL THERAPY 1/> REGIONS: CPT | Mod: GP,CQ

## 2024-03-20 ASSESSMENT — PAIN SCALES - GENERAL: PAINLEVEL_OUTOF10: 1

## 2024-03-20 ASSESSMENT — PAIN - FUNCTIONAL ASSESSMENT: PAIN_FUNCTIONAL_ASSESSMENT: 0-10

## 2024-03-20 NOTE — PROGRESS NOTES
"Physical Therapy Treatment    Patient Name: Shona Hansen  MRN: [id number]  Today's Date: 3/20/2024  Time Calculation  Start Time: 0915  Stop Time: 1000  Time Calculation (min): 45 min  PT Therapeutic Procedures Time Entry  Manual Therapy Time Entry: 8  Therapeutic Exercise Time Entry: 35       Assessment:   Patient identified by name and date of birth. Per patient DC 1/2 foam roll due to pain in \"other areas\". Held cupping due per patient due to she does not want the discoloration. She demonstrated good tolerance to progression of active movements form iso walk outs in all directions. She presented with good ROM with PROM.     Plan:  OP PT Plan  Treatment/Interventions: Cryotherapy, Dry needling, Education/ Instruction, Electrical stimulation, Hot pack, Manual therapy, Self care/ home management, Therapeutic exercises (IASTM/cupping)  PT Plan: Skilled PT  PT Frequency: 2 times per week  Duration: 4wks  Onset Date: 09/21/23  Certification Period Start Date: 02/23/24  Certification Period End Date: 05/23/24  Rehab Potential: Good  Plan of Care Agreement: Patient  Patient has re-check with PT next visit.   Current Problem  Problem List Items Addressed This Visit             ICD-10-CM    Impingement syndrome of right shoulder M75.41       Subjective Patient reported compliance with  % of the time  for ROM and verbalized understanding.  No change in Sx noted after last treatment.  General  Reason for Referral: R shldr impingement  Referred By: Marianne Peck CNP  General Comment: Visit 14, 5/ POC  Precautions  Precautions  Precautions Comment: none, per patient she had blader surgery 01/22/24 she reported she is not alowed to lift untill she see the doctor 02/02/24=cleared to lift now    Pain  Pain Assessment: 0-10  Pain Score: 1  Pain Location: Shoulder  Pain Orientation: Right     Treatments:  Therapeutic Exercise  Therapeutic Exercise Performed: Yes  UBE 3' fwd 3' bwd  Ball on wall 2x 10    - flex   - Med/lat x " "10   -CW/CCW x10 ea  Resisted Rows 2 x 10 pink tube  Uni shoulder ext orange band 2 x 10   Shoulder with t-band orange band 2 steps 2 x 10 Progressed to active   -Flex/ext/abd/add x10    Shoulder seated flex 2 x 10 1#  Shoulder seated abd 2 x 10 1#  IR Strap stretch x10 10\" hold   S/L ABD with scap retraction x10 (N)   S/L Flex with scap retraction x10 (N)    Applied KT to bicep and shoulder today 1 Y strip on bicep and 1 strip on shoulder     Not this date.   Pulley 2' fwd 2' scap (X)  Supine protraction 2 x 10 (X)  Wall slide x10 5\" hold (X)  Wall flex with bolster 2 x 10 (X)  Supine cane (X)  -flex 2 x 10   -press up 2 x 10   -ER 2 x 10   -abd 2 x 10   Supine abc 1 cycle (X)  1/2 foam roll (X per patient)  - pec stretch  - hugs  - backstroke    Manual Therapy  Manual Therapy Performed: Yes   Manual Therapy Performed: Yes  PROM and STW to R UE   (X)   Cupping to upper R UE bicep area static and dynamic movements (X)     OP EDUCATION:   Access Code: [email or web address]  URL: [email or web address]  Date: 02/16/2024  Prepared by: Michelle Cao     Exercises  - Standing Shoulder Flexion Reactive Isometric  - 1 x daily - 7 x weekly - 2 sets - 10 reps  - Standing Shoulder Row Reactive Isometric  - 1 x daily - 7 x weekly - 2 sets - 10 reps  - Shoulder Internal Rotation Reactive Isometrics  - 1 x daily - 7 x weekly - 2 sets - 10 reps  - Shoulder External Rotation Reactive Isometrics  - 1 x daily - 7 x weekly - 2 sets - 10 reps  Access Code: [email or web address]  URL: [email or web address]  Date: 02/12/2024  Prepared by: Rosa M Mcneal     Exercises  - Standing Row with Anchored Resistance  - 1 x daily - 7 x weekly - 2 sets - 10 reps  - Shoulder Extension with Resistance - Palms Forward  - 1 x daily - 7 x weekly - 2 sets - 10 reps  Access Code: [email or web address]  URL: [email or web address]  Date: 01/26/2024  Prepared by: Michelle Cao     Exercises  - Seated Shoulder Flexion AAROM with " "Pulley Behind  - 1 x daily - 7 x weekly - 2 sets - 10 reps - 3-5 hold  - Seated Shoulder Abduction AAROM with Pulley Behind  - 1 x daily - 7 x weekly - 2 sets - 10 reps - 3-5 hold  - Shoulder Flexion Wall Slide with Towel  - 1 x daily - 7 x weekly - 1 sets - 10 reps - 5-10 hold  - Supine Shoulder External Rotation with Dowel  - 1 x daily - 7 x weekly - 1 sets - 10 reps  - Supine Shoulder Flexion Extension AAROM with Dowel  - 1 x daily - 7 x weekly - 1 sets - 10 reps  - Supine Shoulder Press with Dowel  - 1 x daily - 7 x weekly - 1 sets - 10 reps  - Supine Shoulder Abduction AAROM with Dowel  - 1 x daily - 7 x weekly - 1 sets - 10 reps  - Standing Isometric Shoulder Abduction with Doorway - Arm Bent  - 1 x daily - 7 x weekly - 2 sets - 10 reps - 3-5 hold  - Isometric Shoulder Adduction  - 1 x daily - 7 x weekly - 2 sets - 10 reps - 3-5 hold  - Standing Isometric Shoulder Flexion with Doorway - Arm Bent  - 1 x daily - 7 x weekly - 2 sets - 10 reps - 3-5 hold  - Standing Isometric Shoulder Extension with Doorway - Arm Bent  - 1 x daily - 7 x weekly - 2 sets - 10 reps - 3-5 hold      Goals:  Active       PT Problem       PT Goal 1 (Progressing)       Start:  01/17/24    Expected End:  04/16/24       1. Independent HEP to allow for 50% reduction in max ADL C/C sx ( 5/10) 2-3wks  2/23/24: Reports at least 50% reduction in ADL issues  2. 0/10 night time sx to allow for uninterrupted sleep x 1wk ( 7/7) 2-3wks  2/23/24: 2/10 avg pain, waking 2/7 nights per week  3. Survey score improvement from 36% to 25% (QDI) 3-4wks  2/23/24: QuickDash score 25%  4. ROM increase to allow for improved ADL reaching (from 85 to 135 active elevation) 3-4wks  2/23/24: 160 deg flex, 115 abd but still has pain  5. Strength increase to allow for improved ADL object handling (from 4-/5 to 4+/5 R shldr) 3-4wks         PT Goal 2 (Progressing)       Start:  01/17/24    Expected End:  04/16/24       1.\"I want to use my arm normally again\".            "

## 2024-03-25 ENCOUNTER — TREATMENT (OUTPATIENT)
Dept: PHYSICAL THERAPY | Facility: CLINIC | Age: 74
End: 2024-03-25
Payer: MEDICARE

## 2024-03-25 DIAGNOSIS — M75.41 IMPINGEMENT SYNDROME OF RIGHT SHOULDER: ICD-10-CM

## 2024-03-25 PROCEDURE — 97110 THERAPEUTIC EXERCISES: CPT | Mod: GP,KX

## 2024-03-25 ASSESSMENT — PAIN - FUNCTIONAL ASSESSMENT: PAIN_FUNCTIONAL_ASSESSMENT: 0-10

## 2024-03-25 ASSESSMENT — PAIN SCALES - GENERAL: PAINLEVEL_OUTOF10: 1

## 2024-03-25 NOTE — PROGRESS NOTES
Physical Therapy    Physical Therapy Treatment    Patient Name: Shona Hansen  MRN: [id number]  Today's Date: 3/25/2024  Time Calculation  Start Time: 0824  Stop Time: 0911  Time Calculation (min): 47 min     PT Therapeutic Procedures Time Entry  Therapeutic Exercise Time Entry: 42                   Current Problem  Problem List Items Addressed This Visit             ICD-10-CM    Impingement syndrome of right shoulder M75.41    Relevant Orders    Follow Up In Physical Therapy        General  Reason for Referral: R shldr impingement  Referred By: Marianne Peck CNP  General Comment: Visit 15, 6/8 POC    Subjective   Patient reports that she had increased pain this weekend in her shoulder but cannot contribute it to any increase in activity or injury, she could not get the pain to calm down no matter what she did. She notes it was a 2-3/10 all day on Saturday. She notes that overall the should continues to improve but reaching across her body continues to be difficult, the pain catches.  Patient does not return to see the orthopedic surgeon until May. She goes on a couple of back to back vacations and will be out of town for almost 30 days.      Precautions  Precautions  Precautions Comment: none, per patient she had blader surgery 01/22/24 she reported she is not alowed to lift untill she see the doctor 02/02/24=cleared to lift now    Pain  Pain Assessment: 0-10  Pain Score: 1  Pain Location: Shoulder  Pain Orientation: Right    Objective   160 deg flex, abd 150 deg AROM; 180 deg both directions passively  flex 4/5, abd 4-/5, IR 4+/5, ER 4/5  Outcome Measures:  Other Measures  Other Outcome Measures: QuickDash 18.18%     Treatments:  UBE 3' fwd 3' bwd  Ball on wall 2x 10    - flex   - Med/lat x 10   -CW/CCW x10 ea  Resisted Rows 2 x 10 pink tube  Uni shoulder ext orange band 2 x 10   Shoulder with t-band orange band 2 steps 2 x 10 Progressed to active   -Flex/ext/abd/add x10    H abd/H add-yellow band x 10 (N)  Stabilize  "and reach 3 dir-yellow loop 2 x 5 (N)  Shoulder seated flex 2 x 10 1#  Shoulder seated abd 2 x 10 1#  IR Strap stretch x10 10\" hold   S/L ABD with scap retraction x10 (X) no time  S/L Flex with scap retraction x10 (X) no time   Applied KT to bicep and shoulder today 1 Y strip on bicep and 1 strip on shoulder (X) no  time    Not this date.   Pulley 2' fwd 2' scap (X)  Supine protraction 2 x 10 (X)  Wall slide x10 5\" hold (X)  Wall flex with bolster 2 x 10 (X)  Supine cane (X)  -flex 2 x 10   -press up 2 x 10   -ER 2 x 10   -abd 2 x 10   Supine abc 1 cycle (X)  1/2 foam roll (X per patient)  - pec stretch  - hugs  - backstroke    Manual Therapy  Manual Therapy Performed: Yes   Manual Therapy Performed: Yes  PROM and STW to R UE   (X)   Cupping to upper R UE bicep area static and dynamic movements (X)     OP EDUCATION:   Access Code: [email or web address]  URL: [email or web address]  Date: 02/16/2024  Prepared by: Michelle Cao     Exercises  - Standing Shoulder Flexion Reactive Isometric  - 1 x daily - 7 x weekly - 2 sets - 10 reps  - Standing Shoulder Row Reactive Isometric  - 1 x daily - 7 x weekly - 2 sets - 10 reps  - Shoulder Internal Rotation Reactive Isometrics  - 1 x daily - 7 x weekly - 2 sets - 10 reps  - Shoulder External Rotation Reactive Isometrics  - 1 x daily - 7 x weekly - 2 sets - 10 reps  Access Code: [email or web address]  URL: [email or web address]  Date: 02/12/2024  Prepared by: Rosa M Mcneal     Exercises  - Standing Row with Anchored Resistance  - 1 x daily - 7 x weekly - 2 sets - 10 reps  - Shoulder Extension with Resistance - Palms Forward  - 1 x daily - 7 x weekly - 2 sets - 10 reps  Access Code: [email or web address]  URL: [email or web address]  Date: 01/26/2024  Prepared by: Michelle Cao     Exercises  - Seated Shoulder Flexion AAROM with Pulley Behind  - 1 x daily - 7 x weekly - 2 sets - 10 reps - 3-5 hold  - Seated Shoulder Abduction AAROM with Pulley " Behind  - 1 x daily - 7 x weekly - 2 sets - 10 reps - 3-5 hold  - Shoulder Flexion Wall Slide with Towel  - 1 x daily - 7 x weekly - 1 sets - 10 reps - 5-10 hold  - Supine Shoulder External Rotation with Dowel  - 1 x daily - 7 x weekly - 1 sets - 10 reps  - Supine Shoulder Flexion Extension AAROM with Dowel  - 1 x daily - 7 x weekly - 1 sets - 10 reps  - Supine Shoulder Press with Dowel  - 1 x daily - 7 x weekly - 1 sets - 10 reps  - Supine Shoulder Abduction AAROM with Dowel  - 1 x daily - 7 x weekly - 1 sets - 10 reps  - Standing Isometric Shoulder Abduction with Doorway - Arm Bent  - 1 x daily - 7 x weekly - 2 sets - 10 reps - 3-5 hold  - Isometric Shoulder Adduction  - 1 x daily - 7 x weekly - 2 sets - 10 reps - 3-5 hold  - Standing Isometric Shoulder Flexion with Doorway - Arm Bent  - 1 x daily - 7 x weekly - 2 sets - 10 reps - 3-5 hold  - Standing Isometric Shoulder Extension with Doorway - Arm Bent  - 1 x daily - 7 x weekly - 2 sets - 10 reps - 3-5 hold    OP Education      Assessment  Patient verified by name and . Continuing to make progress but it is slow, AROM in abd direction is improved, PROM is full. Strength improving but abd continues to be most painful. Still waiting approx 2 nights per week with pain. Patient will be out of town for approx 1 month, plans to attend 1 more PT session this week and will consolidate program in to an HEP she can continue while out of town, will trial HEP only during that time, if patients symptoms persist, will return to PT if able before 24. If no return to skilled PT before 24, will discharge chart.    PT Assessment Results: Decreased strength, Decreased range of motion, Pain  Rehab Prognosis: Good    Plan  Treatment/Interventions: Cryotherapy, Dry needling, Education/ Instruction, Electrical stimulation, Hot pack, Manual therapy, Self care/ home management, Therapeutic exercises (IASTM/cupping)  PT Plan: Skilled PT  PT Frequency: 2 times per  "week  Duration: 4wks  Onset Date: 09/21/23  Certification Period Start Date: 02/23/24  Certification Period End Date: 05/23/24  Rehab Potential: Good  Plan of Care Agreement: Patient  Patient will attend 1 more session this week with consolidate of current program in to an HEP she can perform while out of town for almost 1 month, will continue with exercises while out of town, if returns before 4/29/24 will continue with skilled PT for addtiional 8 visits, if not return then will discharge chart.  Active       PT Problem       PT Goal 1 (Progressing)       Start:  01/17/24    Expected End:  05/06/24       1. Independent HEP to allow for 50% reduction in max ADL C/C sx ( 5/10) 2-3wks  2/23/24: Reports at least 50% reduction in ADL issues  3/25/24: MET  2. 0/10 night time sx to allow for uninterrupted sleep x 1wk ( 7/7) 2-3wks  2/23/24: 2/10 avg pain, waking 2/7 nights per week  3/25/24:  2/10 avg pain, waking 2/7 nights per week  3. Survey score improvement from 36% to 25% (QDI) 3-4wks  2/23/24: QuickDash score 25%  3/25/24: 18.18%-MET  4. ROM increase to allow for improved ADL reaching (from 85 to 135 active elevation) 3-4wks  2/23/24: 160 deg flex, 115 abd but still has pain  3/25/24: 160 deg flex, abd 150 deg AROM; 180 deg both directions passively--PARTIALLY MET  5. Strength increase to allow for improved ADL object handling (from 4-/5 to 4+/5 R shldr) 3-4wks  3/25/24: flex 4/5, abd 4-/5, IR 4+/5, ER 4/5         PT Goal 2 (Progressing)       Start:  01/17/24    Expected End:  05/06/24       1.\"I want to use my arm normally again\".           "

## 2024-03-29 ENCOUNTER — TREATMENT (OUTPATIENT)
Dept: PHYSICAL THERAPY | Facility: CLINIC | Age: 74
End: 2024-03-29
Payer: MEDICARE

## 2024-03-29 DIAGNOSIS — M75.41 IMPINGEMENT SYNDROME OF RIGHT SHOULDER: ICD-10-CM

## 2024-03-29 PROCEDURE — 97110 THERAPEUTIC EXERCISES: CPT | Mod: GP,CQ

## 2024-03-29 ASSESSMENT — PAIN - FUNCTIONAL ASSESSMENT: PAIN_FUNCTIONAL_ASSESSMENT: 0-10

## 2024-03-29 ASSESSMENT — PAIN SCALES - GENERAL: PAINLEVEL_OUTOF10: 0 - NO PAIN

## 2024-03-29 NOTE — PROGRESS NOTES
Physical Therapy Treatment    Patient Name: Shona Hansen  MRN: [id number]  Today's Date: 3/29/2024  Time Calculation  Start Time: 0832  Stop Time: 0910  Time Calculation (min): 38 min  PT Therapeutic Procedures Time Entry  Therapeutic Exercise Time Entry: 38        General:  General  Reason for Referral: R shldr impingement  Referred By: Marianne Peck CNP  General Comment: Visit 16, 7/8 POC    Assessment:   Updated HEP program today d/t patient going on vacation.   Completed PRE's and made sure patient had correct form with completion.     Plan:  Plan to complete HEP at this point d/t going on vacation. JA     OP PT Plan  Treatment/Interventions: Cryotherapy, Dry needling, Education/ Instruction, Electrical stimulation, Hot pack, Manual therapy, Self care/ home management, Therapeutic exercises (IASTM/cupping)  PT Plan: Skilled PT  PT Frequency: 2 times per week  Duration: 4wks  Onset Date: 09/21/23  Certification Period Start Date: 02/23/24  Certification Period End Date: 05/23/24  Rehab Potential: Good  Plan of Care Agreement: Patient    Current Problem  Problem List Items Addressed This Visit             ICD-10-CM    Impingement syndrome of right shoulder M75.41       Subjective Patient identified by name and date of birth.   Patient is compliant with HEP completion with no issues to report at this time.  Patient reports that she is not having any pain today.     Precautions  Precautions  Precautions Comment: none, per patient she had blader surgery 01/22/24 she reported she is not alowed to lift untill she see the doctor 02/02/24=cleared to lift now    Pain  Pain Assessment: 0-10  Pain Score: 0 - No pain  Pain Location: Shoulder    Treatments:  UBE 3' fwd 3' bwd  Ball on wall 2x 10    - flex   - Med/lat x 10   -CW/CCW x10 ea  Resisted Rows 2 x 10 pink tube  Uni shoulder ext orange band 2 x 10 (X)   Shoulder with t-band orange band 2 steps 2 x 10  active   -Flex/ext/abd/add x10    H abd/H add-yellow band x 10  "(  Stabilize and reach 3 dir-yellow loop 2 x 5   Shoulder seated flex 2 x 10 1# (P to standing)   Shoulder seated abd 2 x 10 1# (P to standing)   IR Strap stretch x10 10\" hold   S/L ABD with scap retraction x10 (X) no time  S/L Flex with scap retraction x10 (X) no time   Applied KT to bicep and shoulder today 1 Y strip on bicep and 1 strip on shoulder (X) no  time     Not this date.   Pulley 2' fwd 2' scap (X)  Supine protraction 2 x 10 (X)  Wall slide x10 5\" hold (X)  Wall flex with bolster 2 x 10 (X)  Supine cane (X)  -flex 2 x 10   -press up 2 x 10   -ER 2 x 10   -abd 2 x 10   Supine abc 1 cycle (X)  1/2 foam roll (X per patient)  - pec stretch  - hugs  - backstroke       Manual Therapy  PROM and STW to R UE   (X)   Cupping to upper R UE bicep area static and dynamic movements (X)      OP EDUCATION:   Access Code: [email or web address]  URL: [email or web address]  Date: 03/29/2024  Prepared by: Rosa M Mcneal    Exercises  - Standing Plank on Wall with Reaches and Resistance  - 1 x daily - 7 x weekly - 2 sets - 10 reps  - Standing Shoulder Flexion with Dumbbells  - 1 x daily - 7 x weekly - 2 sets - 10 reps  - Shoulder Abduction with Dumbbells - Palms Down  - 1 x daily - 7 x weekly - 2 sets - 10 reps  - Shoulder External Rotation with Anchored Resistance with Towel Under Elbow  - 1 x daily - 7 x weekly - 2 sets - 10 reps  - Standing Shoulder Internal Rotation with Anchored Resistance  - 1 x daily - 7 x weekly - 2 sets - 10 reps  - Standing Shoulder Horizontal Abduction with Resistance  - 1 x daily - 7 x weekly - 2 sets - 10 reps  Access Code: [email or web address]  URL: [email or web address]  Date: 02/16/2024  Prepared by: Michelle Cao     Exercises  - Standing Shoulder Flexion Reactive Isometric  - 1 x daily - 7 x weekly - 2 sets - 10 reps  - Standing Shoulder Row Reactive Isometric  - 1 x daily - 7 x weekly - 2 sets - 10 reps  - Shoulder Internal Rotation Reactive Isometrics  - 1 x daily - 7 " x weekly - 2 sets - 10 reps  - Shoulder External Rotation Reactive Isometrics  - 1 x daily - 7 x weekly - 2 sets - 10 reps  Access Code: [email or web address]  URL: [email or web address]  Date: 02/12/2024  Prepared by: Rosa M Mcneal     Exercises  - Standing Row with Anchored Resistance  - 1 x daily - 7 x weekly - 2 sets - 10 reps  - Shoulder Extension with Resistance - Palms Forward  - 1 x daily - 7 x weekly - 2 sets - 10 reps  Access Code: [email or web address]  URL: [email or web address]  Date: 01/26/2024  Prepared by: Michelle Cao     Exercises  - Seated Shoulder Flexion AAROM with Pulley Behind  - 1 x daily - 7 x weekly - 2 sets - 10 reps - 3-5 hold  - Seated Shoulder Abduction AAROM with Pulley Behind  - 1 x daily - 7 x weekly - 2 sets - 10 reps - 3-5 hold  - Shoulder Flexion Wall Slide with Towel  - 1 x daily - 7 x weekly - 1 sets - 10 reps - 5-10 hold  - Supine Shoulder External Rotation with Dowel  - 1 x daily - 7 x weekly - 1 sets - 10 reps  - Supine Shoulder Flexion Extension AAROM with Dowel  - 1 x daily - 7 x weekly - 1 sets - 10 reps  - Supine Shoulder Press with Dowel  - 1 x daily - 7 x weekly - 1 sets - 10 reps  - Supine Shoulder Abduction AAROM with Dowel  - 1 x daily - 7 x weekly - 1 sets - 10 reps  - Standing Isometric Shoulder Abduction with Doorway - Arm Bent  - 1 x daily - 7 x weekly - 2 sets - 10 reps - 3-5 hold  - Isometric Shoulder Adduction  - 1 x daily - 7 x weekly - 2 sets - 10 reps - 3-5 hold  - Standing Isometric Shoulder Flexion with Doorway - Arm Bent  - 1 x daily - 7 x weekly - 2 sets - 10 reps - 3-5 hold  - Standing Isometric Shoulder Extension with Doorway - Arm Bent  - 1 x daily - 7 x weekly - 2 sets - 10 reps - 3-5 hold    Goals:  Active       PT Problem       PT Goal 1 (Progressing)       Start:  01/17/24    Expected End:  05/06/24       1. Independent HEP to allow for 50% reduction in max ADL C/C sx ( 5/10) 2-3wks  2/23/24: Reports at least 50% reduction  "in ADL issues  3/25/24: MET  2. 0/10 night time sx to allow for uninterrupted sleep x 1wk ( 7/7) 2-3wks  2/23/24: 2/10 avg pain, waking 2/7 nights per week  3/25/24:  2/10 avg pain, waking 2/7 nights per week  3. Survey score improvement from 36% to 25% (QDI) 3-4wks  2/23/24: QuickDash score 25%  3/25/24: 18.18%-MET  4. ROM increase to allow for improved ADL reaching (from 85 to 135 active elevation) 3-4wks  2/23/24: 160 deg flex, 115 abd but still has pain  3/25/24: 160 deg flex, abd 150 deg AROM; 180 deg both directions passively--PARTIALLY MET  5. Strength increase to allow for improved ADL object handling (from 4-/5 to 4+/5 R shldr) 3-4wks  3/25/24: flex 4/5, abd 4-/5, IR 4+/5, ER 4/5         PT Goal 2 (Progressing)       Start:  01/17/24    Expected End:  05/06/24       1.\"I want to use my arm normally again\".            "

## 2024-04-24 ENCOUNTER — TREATMENT (OUTPATIENT)
Dept: PHYSICAL THERAPY | Facility: CLINIC | Age: 74
End: 2024-04-24
Payer: MEDICARE

## 2024-04-24 DIAGNOSIS — M75.41 IMPINGEMENT SYNDROME OF RIGHT SHOULDER: ICD-10-CM

## 2024-04-24 PROCEDURE — 97140 MANUAL THERAPY 1/> REGIONS: CPT | Mod: GP,CQ

## 2024-04-24 ASSESSMENT — PAIN - FUNCTIONAL ASSESSMENT: PAIN_FUNCTIONAL_ASSESSMENT: 0-10

## 2024-04-24 ASSESSMENT — PAIN SCALES - GENERAL: PAINLEVEL_OUTOF10: 2

## 2024-04-24 NOTE — PROGRESS NOTES
Physical Therapy Treatment    Patient Name: Shona Hansen  MRN: [id number]  Today's Date: 4/24/2024  Time Calculation  Start Time: 0915  Stop Time: 0959  Time Calculation (min): 44 min  PT Therapeutic Procedures Time Entry  Manual Therapy Time Entry: 40                 General:  General  Reason for Referral: R shldr impingement  Referred By: Marianne Peck CNP  General Comment: Visit 17, 8/8 POC    Assessment:   Focused on manual techniques today based on patient complaints of symptoms to try to alleviate increased tightness.   Large palpable spasm in tricep/bicep during IASTM.   Point tenderness along bicep tendon along bicipital groove.   Relief of tightness post session after STW  Reviewed HEP.     Plan:  Plan to continue with manual techniques for decreased spasms and tightness. JA    OP PT Plan  Treatment/Interventions: Cryotherapy, Dry needling, Education/ Instruction, Electrical stimulation, Hot pack, Manual therapy, Self care/ home management, Therapeutic exercises (IASTM/cupping)  PT Plan: Skilled PT  PT Frequency: 2 times per week  Duration: 4wks  Onset Date: 09/21/23  Certification Period Start Date: 02/23/24  Certification Period End Date: 05/23/24  Rehab Potential: Good  Plan of Care Agreement: Patient    Current Problem  Problem List Items Addressed This Visit             ICD-10-CM    Impingement syndrome of right shoulder M75.41       Subjective Patient identified by name and date of birth.   Patient is compliant with HEP completion with no issues to report at this time.  Has F/U with NP on 5/6 but may have a conflict with one of her husbands appointments.   States that she just got back from vacation and notes that she has a lot of tightness and spasms in the upper arm.   Positioning when seated at shows was uncomfortable due too the arm rests were too high. Has aching in the top of the shoulder.     Precautions  Precautions  Precautions Comment: none, per patient she had blader surgery 01/22/24 she  "reported she is not alowed to lift untill she see the doctor 02/02/24=cleared to lift now    Pain  Pain Assessment: 0-10  Pain Score: 2  Pain Location:  (Top of shoulder)    Treatments:  UBE 3' fwd 3' bwd    Not today:   Ball on wall 2x 10    - flex   - Med/lat x 10   -CW/CCW x10 ea  Resisted Rows 2 x 10 pink tube  Uni shoulder ext orange band 2 x 10 (X)   Shoulder with t-band orange band 2 steps 2 x 10  active   -Flex/ext/abd/add x10    H abd/H add-yellow band x 10 (  Stabilize and reach 3 dir-yellow loop 2 x 5   Shoulder seated flex 2 x 10 1# (P to standing)   Shoulder seated abd 2 x 10 1# (P to standing)   IR Strap stretch x10 10\" hold   S/L ABD with scap retraction x10 (X) no time  S/L Flex with scap retraction x10 (X) no time   Applied KT to bicep and shoulder today 1 Y strip on bicep and 1 strip on shoulder (X) no  time     Not this date.   Pulley 2' fwd 2' scap (X)  Supine protraction 2 x 10 (X)  Wall slide x10 5\" hold (X)  Wall flex with bolster 2 x 10 (X)  Supine cane (X)  -flex 2 x 10   -press up 2 x 10   -ER 2 x 10   -abd 2 x 10   Supine abc 1 cycle (X)  1/2 foam roll (X per patient)  - pec stretch  - hugs  - backstroke        Manual Therapy  PROM and STW to R UE   (X)   Cupping to upper R UE bicep area static and dynamic movements (X)  [facility]     OP EDUCATION:    Access Code: [email or web address]  URL: [email or web address]  Date: 03/29/2024  Prepared by: Rosa M Mcneal     Exercises  - Standing Plank on Wall with Reaches and Resistance  - 1 x daily - 7 x weekly - 2 sets - 10 reps  - Standing Shoulder Flexion with Dumbbells  - 1 x daily - 7 x weekly - 2 sets - 10 reps  - Shoulder Abduction with Dumbbells - Palms Down  - 1 x daily - 7 x weekly - 2 sets - 10 reps  - Shoulder External Rotation with Anchored Resistance with Towel Under Elbow  - 1 x daily - 7 x weekly - 2 sets - 10 reps  - Standing Shoulder Internal Rotation with Anchored Resistance  - 1 x daily - 7 x weekly - 2 sets - 10 reps  - " Standing Shoulder Horizontal Abduction with Resistance  - 1 x daily - 7 x weekly - 2 sets - 10 reps  Access Code: [email or web address]  URL: [email or web address]  Date: 02/16/2024  Prepared by: Michelle Cao     Exercises  - Standing Shoulder Flexion Reactive Isometric  - 1 x daily - 7 x weekly - 2 sets - 10 reps  - Standing Shoulder Row Reactive Isometric  - 1 x daily - 7 x weekly - 2 sets - 10 reps  - Shoulder Internal Rotation Reactive Isometrics  - 1 x daily - 7 x weekly - 2 sets - 10 reps  - Shoulder External Rotation Reactive Isometrics  - 1 x daily - 7 x weekly - 2 sets - 10 reps  Access Code: [email or web address]  URL: [email or web address]  Date: 02/12/2024  Prepared by: Rosa M Mcneal     Exercises  - Standing Row with Anchored Resistance  - 1 x daily - 7 x weekly - 2 sets - 10 reps  - Shoulder Extension with Resistance - Palms Forward  - 1 x daily - 7 x weekly - 2 sets - 10 reps  Access Code: [email or web address]  URL: [email or web address]  Date: 01/26/2024  Prepared by: Michelle Cao     Exercises  - Seated Shoulder Flexion AAROM with Pulley Behind  - 1 x daily - 7 x weekly - 2 sets - 10 reps - 3-5 hold  - Seated Shoulder Abduction AAROM with Pulley Behind  - 1 x daily - 7 x weekly - 2 sets - 10 reps - 3-5 hold  - Shoulder Flexion Wall Slide with Towel  - 1 x daily - 7 x weekly - 1 sets - 10 reps - 5-10 hold  - Supine Shoulder External Rotation with Dowel  - 1 x daily - 7 x weekly - 1 sets - 10 reps  - Supine Shoulder Flexion Extension AAROM with Dowel  - 1 x daily - 7 x weekly - 1 sets - 10 reps  - Supine Shoulder Press with Dowel  - 1 x daily - 7 x weekly - 1 sets - 10 reps  - Supine Shoulder Abduction AAROM with Dowel  - 1 x daily - 7 x weekly - 1 sets - 10 reps  - Standing Isometric Shoulder Abduction with Doorway - Arm Bent  - 1 x daily - 7 x weekly - 2 sets - 10 reps - 3-5 hold  - Isometric Shoulder Adduction  - 1 x daily - 7 x weekly - 2 sets - 10 reps - 3-5  "hold  - Standing Isometric Shoulder Flexion with Doorway - Arm Bent  - 1 x daily - 7 x weekly - 2 sets - 10 reps - 3-5 hold  - Standing Isometric Shoulder Extension with Doorway - Arm Bent  - 1 x daily - 7 x weekly - 2 sets - 10 reps - 3-5 hold    Goals:  Active       PT Problem       PT Goal 1 (Progressing)       Start:  01/17/24    Expected End:  05/06/24       1. Independent HEP to allow for 50% reduction in max ADL C/C sx ( 5/10) 2-3wks  2/23/24: Reports at least 50% reduction in ADL issues  3/25/24: MET  2. 0/10 night time sx to allow for uninterrupted sleep x 1wk ( 7/7) 2-3wks  2/23/24: 2/10 avg pain, waking 2/7 nights per week  3/25/24:  2/10 avg pain, waking 2/7 nights per week  3. Survey score improvement from 36% to 25% (QDI) 3-4wks  2/23/24: QuickDash score 25%  3/25/24: 18.18%-MET  4. ROM increase to allow for improved ADL reaching (from 85 to 135 active elevation) 3-4wks  2/23/24: 160 deg flex, 115 abd but still has pain  3/25/24: 160 deg flex, abd 150 deg AROM; 180 deg both directions passively--PARTIALLY MET  5. Strength increase to allow for improved ADL object handling (from 4-/5 to 4+/5 R shldr) 3-4wks  3/25/24: flex 4/5, abd 4-/5, IR 4+/5, ER 4/5         PT Goal 2 (Progressing)       Start:  01/17/24    Expected End:  05/06/24       1.\"I want to use my arm normally again\".            "

## 2024-04-25 ENCOUNTER — HOSPITAL ENCOUNTER (OUTPATIENT)
Age: 74
Discharge: HOME | End: 2024-04-25
Payer: MEDICARE

## 2024-04-25 DIAGNOSIS — M79.661: Primary | ICD-10-CM

## 2024-04-25 DIAGNOSIS — M79.662: ICD-10-CM

## 2024-04-25 PROCEDURE — 93970 EXTREMITY STUDY: CPT

## 2024-05-01 ENCOUNTER — TREATMENT (OUTPATIENT)
Dept: PHYSICAL THERAPY | Facility: CLINIC | Age: 74
End: 2024-05-01
Payer: MEDICARE

## 2024-05-01 DIAGNOSIS — M75.41 IMPINGEMENT SYNDROME OF RIGHT SHOULDER: ICD-10-CM

## 2024-05-01 PROCEDURE — 97140 MANUAL THERAPY 1/> REGIONS: CPT | Mod: GP,CQ

## 2024-05-01 PROCEDURE — 97110 THERAPEUTIC EXERCISES: CPT | Mod: GP,CQ

## 2024-05-01 ASSESSMENT — PAIN SCALES - GENERAL: PAINLEVEL_OUTOF10: 1

## 2024-05-01 ASSESSMENT — PAIN - FUNCTIONAL ASSESSMENT: PAIN_FUNCTIONAL_ASSESSMENT: 0-10

## 2024-05-01 NOTE — PROGRESS NOTES
Physical Therapy Treatment    Patient Name: Shona Hansen  MRN: [id number]  Today's Date: 5/1/2024  Time Calculation  Start Time: 0915  Stop Time: 1000  Time Calculation (min): 45 min  PT Therapeutic Procedures Time Entry  Manual Therapy Time Entry: 25  Therapeutic Exercise Time Entry: 18                 General:  General  Reason for Referral: R shldr impingement  Referred By: Marianne Peck CNP  General Comment: Visit 18, 1/8 POC    Assessment:   Patient has area of swelling along deltoid region.  Swelling has approx 3 in circumference. Took images for patient on her phone so she can monitor. IASTM was completed with light pressure today. Decreased spasms after IASTM and decreased pain reported. Added CKC PRE's after consult with PT.     Plan:  Plan to progress to CKC exercises and continue with manual techniques. JA  OP PT Plan  Treatment/Interventions: Cryotherapy, Dry needling, Education/ Instruction, Electrical stimulation, Hot pack, Manual therapy, Self care/ home management, Therapeutic exercises (IASTM/cupping)  PT Plan: Skilled PT  PT Frequency: 2 times per week  Duration: 4wks  Onset Date: 09/21/23  Certification Period Start Date: 02/23/24  Certification Period End Date: 05/23/24  Rehab Potential: Good  Plan of Care Agreement: Patient    Current Problem  Problem List Items Addressed This Visit             ICD-10-CM    Impingement syndrome of right shoulder M75.41       Subjective Patient identified by name and date of birth.   Patient is compliant with HEP completion with no issues to report at this time.  States that the shoulder   States that she is supposed to see her ortho MD 5/06/24    Precautions  Precautions  Precautions Comment: none, per patient she had blader surgery 01/22/24 she reported she is not alowed to lift untill she see the doctor 02/02/24=cleared to lift now    Pain  Pain Assessment: 0-10  Pain Score: 1  Pain Location: Shoulder    Treatments:  UBE 3' fwd 3' bwd  BOSU rocks 2 x 10 (N)  CKC  "weight shifts 2 x 10 (N)   Wall protractions 2 x 10 (N)   Ball on wall 2x 10 23 (N)    - flex   - Med/lat x 10   -CW/CCW x10 ea    Not today (X):  Resisted Rows 2 x 10 pink tube  Uni shoulder ext orange band 2 x 10 (X)   Shoulder with t-band orange band 2 steps 2 x 10  active   -Flex/ext/abd/add x10    H abd/H add-yellow band x 10 (  Stabilize and reach 3 dir-yellow loop 2 x 5   Shoulder seated flex 2 x 10 1# (P to standing)   Shoulder seated abd 2 x 10 1# (P to standing)   IR Strap stretch x10 10\" hold   S/L ABD with scap retraction x10 (X) no time  S/L Flex with scap retraction x10 (X) no time   Applied KT to bicep and shoulder today 1 Y strip on bicep and 1 strip on shoulder (X) no  time     Not this date.   Pulley 2' fwd 2' scap (X)  Supine protraction 2 x 10 (X)  Wall slide x10 5\" hold (X)  Wall flex with bolster 2 x 10 (X)  Supine cane (X)  -flex 2 x 10   -press up 2 x 10   -ER 2 x 10   -abd 2 x 10   Supine abc 1 cycle (X)  1/2 foam roll (X per patient)  - pec stretch  - hugs  - backstroke        Manual Therapy  PROM and STW to R UE     Cupping to upper R UE bicep area static and dynamic movements (X)   [facility]     OP EDUCATION:   Access Code: [email or web address]  URL: [email or web address]  Date: 03/29/2024  Prepared by: Rosa M Mcneal     Exercises  - Standing Plank on Wall with Reaches and Resistance  - 1 x daily - 7 x weekly - 2 sets - 10 reps  - Standing Shoulder Flexion with Dumbbells  - 1 x daily - 7 x weekly - 2 sets - 10 reps  - Shoulder Abduction with Dumbbells - Palms Down  - 1 x daily - 7 x weekly - 2 sets - 10 reps  - Shoulder External Rotation with Anchored Resistance with Towel Under Elbow  - 1 x daily - 7 x weekly - 2 sets - 10 reps  - Standing Shoulder Internal Rotation with Anchored Resistance  - 1 x daily - 7 x weekly - 2 sets - 10 reps  - Standing Shoulder Horizontal Abduction with Resistance  - 1 x daily - 7 x weekly - 2 sets - 10 reps  Access Code: [email or web address]  URL: " [email or web address]  Date: 02/16/2024  Prepared by: Michelle Cao     Exercises  - Standing Shoulder Flexion Reactive Isometric  - 1 x daily - 7 x weekly - 2 sets - 10 reps  - Standing Shoulder Row Reactive Isometric  - 1 x daily - 7 x weekly - 2 sets - 10 reps  - Shoulder Internal Rotation Reactive Isometrics  - 1 x daily - 7 x weekly - 2 sets - 10 reps  - Shoulder External Rotation Reactive Isometrics  - 1 x daily - 7 x weekly - 2 sets - 10 reps  Access Code: [email or web address]  URL: [email or web address]  Date: 02/12/2024  Prepared by: Rosa M Mcneal     Exercises  - Standing Row with Anchored Resistance  - 1 x daily - 7 x weekly - 2 sets - 10 reps  - Shoulder Extension with Resistance - Palms Forward  - 1 x daily - 7 x weekly - 2 sets - 10 reps  Access Code: [email or web address]  URL: [email or web address]  Date: 01/26/2024  Prepared by: Michelle Cao     Exercises  - Seated Shoulder Flexion AAROM with Pulley Behind  - 1 x daily - 7 x weekly - 2 sets - 10 reps - 3-5 hold  - Seated Shoulder Abduction AAROM with Pulley Behind  - 1 x daily - 7 x weekly - 2 sets - 10 reps - 3-5 hold  - Shoulder Flexion Wall Slide with Towel  - 1 x daily - 7 x weekly - 1 sets - 10 reps - 5-10 hold  - Supine Shoulder External Rotation with Dowel  - 1 x daily - 7 x weekly - 1 sets - 10 reps  - Supine Shoulder Flexion Extension AAROM with Dowel  - 1 x daily - 7 x weekly - 1 sets - 10 reps  - Supine Shoulder Press with Dowel  - 1 x daily - 7 x weekly - 1 sets - 10 reps  - Supine Shoulder Abduction AAROM with Dowel  - 1 x daily - 7 x weekly - 1 sets - 10 reps  - Standing Isometric Shoulder Abduction with Doorway - Arm Bent  - 1 x daily - 7 x weekly - 2 sets - 10 reps - 3-5 hold  - Isometric Shoulder Adduction  - 1 x daily - 7 x weekly - 2 sets - 10 reps - 3-5 hold  - Standing Isometric Shoulder Flexion with Doorway - Arm Bent  - 1 x daily - 7 x weekly - 2 sets - 10 reps - 3-5 hold  -  "Standing Isometric Shoulder Extension with Doorway - Arm Bent  - 1 x daily - 7 x weekly - 2 sets - 10 reps - 3-5 hold    Goals:  Active       PT Problem       PT Goal 1 (Progressing)       Start:  01/17/24    Expected End:  05/06/24       1. Independent HEP to allow for 50% reduction in max ADL C/C sx ( 5/10) 2-3wks  2/23/24: Reports at least 50% reduction in ADL issues  3/25/24: MET  2. 0/10 night time sx to allow for uninterrupted sleep x 1wk ( 7/7) 2-3wks  2/23/24: 2/10 avg pain, waking 2/7 nights per week  3/25/24:  2/10 avg pain, waking 2/7 nights per week  3. Survey score improvement from 36% to 25% (QDI) 3-4wks  2/23/24: QuickDash score 25%  3/25/24: 18.18%-MET  4. ROM increase to allow for improved ADL reaching (from 85 to 135 active elevation) 3-4wks  2/23/24: 160 deg flex, 115 abd but still has pain  3/25/24: 160 deg flex, abd 150 deg AROM; 180 deg both directions passively--PARTIALLY MET  5. Strength increase to allow for improved ADL object handling (from 4-/5 to 4+/5 R shldr) 3-4wks  3/25/24: flex 4/5, abd 4-/5, IR 4+/5, ER 4/5         PT Goal 2 (Progressing)       Start:  01/17/24    Expected End:  05/06/24       1.\"I want to use my arm normally again\".            "

## 2024-05-08 ENCOUNTER — TREATMENT (OUTPATIENT)
Dept: PHYSICAL THERAPY | Facility: CLINIC | Age: 74
End: 2024-05-08
Payer: MEDICARE

## 2024-05-08 DIAGNOSIS — M75.41 IMPINGEMENT SYNDROME OF RIGHT SHOULDER: ICD-10-CM

## 2024-05-08 PROCEDURE — 97140 MANUAL THERAPY 1/> REGIONS: CPT | Mod: GP,CQ

## 2024-05-08 PROCEDURE — 97110 THERAPEUTIC EXERCISES: CPT | Mod: GP,CQ

## 2024-05-08 ASSESSMENT — PAIN - FUNCTIONAL ASSESSMENT: PAIN_FUNCTIONAL_ASSESSMENT: 0-10

## 2024-05-08 ASSESSMENT — PAIN SCALES - GENERAL: PAINLEVEL_OUTOF10: 1

## 2024-05-08 NOTE — PROGRESS NOTES
"Physical Therapy Treatment        Patient Name: Shona Hansen  MRN: [id number]  Today's Date: 5/8/2024  Time Calculation  Start Time: 1045  Stop Time: 1128  Time Calculation (min): 43 min  PT Therapeutic Procedures Time Entry  Manual Therapy Time Entry: 8  Therapeutic Exercise Time Entry: 30         Current Problem  Problem List Items Addressed This Visit             ICD-10-CM    Impingement syndrome of right shoulder M75.41       Subjective   Pt.'s name and birthday confirmed.  Pt. Is compliant with HEP.  Pt. Reports discomfort in shoulder is about the same.  Pt. States she was doing some cleaning this morning which shoulder is sore.    Reason for Referral: R shldr impingement  Referred By: Marianne Peck CNP  General Comment: Visit 19,  2/8 POC      Precautions  Precautions  Precautions Comment: none, per patient she had blader surgery 01/22/24 she reported she is not alowed to lift untill she see the doctor 02/02/24=cleared to lift now      Pain  Pain Assessment: 0-10  Pain Score: 1  Pain Location: Shoulder    Objective:  Treatments:  30 mins  UBE 3' fwd 3' bwd  BOSU rocks 2 x 10   CKC weight shifts 2 x 10   Wall protractions 2 x 10   Ball on wall 2x 10 23    - flex   - Med/lat x 10   -CW/CCW x10 ea     resumed  Resisted Rows 2 x 10 pink tube  Uni shoulder ext orange band 2 x 10 (X)   Shoulder with t-band orange band 2 steps 2 x 10  active   -Flex/ext/abd/add x10    H abd/H add-yellow band x 10   Stabilize and reach 3 dir-yellow loop 2 x 5   Shoulder seated flex 2 x 10 1# (P to standing)   Shoulder seated abd 2 x 10 1# (P to standing)   IR Strap stretch x10 10\" hold   S/L ABD with scap retraction x10 (X) no time  S/L Flex with scap retraction x10 (X) no time   Applied KT to bicep and shoulder today 1 Y strip on bicep and 1 strip on shoulder (X) no  time     Not this date.   Pulley 2' fwd 2' scap (X)  Supine protraction 2 x 10 (X)  Wall slide x10 5\" hold (X)  Wall flex with bolster 2 x 10 (X)  Supine cane (X)  -flex 2 " x 10   -press up 2 x 10   -ER 2 x 10   -abd 2 x 10   Supine abc 1 cycle (X)  1/2 foam roll (X per patient)  - pec stretch  - hugs  - backstroke        Manual Therapy 8 mins  PROM and STW to R UE   (X)  Cupping to upper R UE bicep area static and dynamic movements (X)   IASTM            OP EDUCATION:  Access Code: [email or web address]  URL: [email or web address]  Date: 03/29/2024  Prepared by: Rosa M Mcneal     Exercises  - Standing Plank on Wall with Reaches and Resistance  - 1 x daily - 7 x weekly - 2 sets - 10 reps  - Standing Shoulder Flexion with Dumbbells  - 1 x daily - 7 x weekly - 2 sets - 10 reps  - Shoulder Abduction with Dumbbells - Palms Down  - 1 x daily - 7 x weekly - 2 sets - 10 reps  - Shoulder External Rotation with Anchored Resistance with Towel Under Elbow  - 1 x daily - 7 x weekly - 2 sets - 10 reps  - Standing Shoulder Internal Rotation with Anchored Resistance  - 1 x daily - 7 x weekly - 2 sets - 10 reps  - Standing Shoulder Horizontal Abduction with Resistance  - 1 x daily - 7 x weekly - 2 sets - 10 reps  Access Code: [email or web address]  URL: [email or web address]  Date: 02/16/2024  Prepared by: Michelle Cao     Exercises  - Standing Shoulder Flexion Reactive Isometric  - 1 x daily - 7 x weekly - 2 sets - 10 reps  - Standing Shoulder Row Reactive Isometric  - 1 x daily - 7 x weekly - 2 sets - 10 reps  - Shoulder Internal Rotation Reactive Isometrics  - 1 x daily - 7 x weekly - 2 sets - 10 reps  - Shoulder External Rotation Reactive Isometrics  - 1 x daily - 7 x weekly - 2 sets - 10 reps  Access Code: [email or web address]  URL: [email or web address]  Date: 02/12/2024  Prepared by: Rosa M Mcneal     Exercises  - Standing Row with Anchored Resistance  - 1 x daily - 7 x weekly - 2 sets - 10 reps  - Shoulder Extension with Resistance - Palms Forward  - 1 x daily - 7 x weekly - 2 sets - 10 reps  Access Code: [email or web address]  URL:  [email or web address]  Date: 01/26/2024  Prepared by: Michelle Cao     Exercises  - Seated Shoulder Flexion AAROM with Pulley Behind  - 1 x daily - 7 x weekly - 2 sets - 10 reps - 3-5 hold  - Seated Shoulder Abduction AAROM with Pulley Behind  - 1 x daily - 7 x weekly - 2 sets - 10 reps - 3-5 hold  - Shoulder Flexion Wall Slide with Towel  - 1 x daily - 7 x weekly - 1 sets - 10 reps - 5-10 hold  - Supine Shoulder External Rotation with Dowel  - 1 x daily - 7 x weekly - 1 sets - 10 reps  - Supine Shoulder Flexion Extension AAROM with Dowel  - 1 x daily - 7 x weekly - 1 sets - 10 reps  - Supine Shoulder Press with Dowel  - 1 x daily - 7 x weekly - 1 sets - 10 reps  - Supine Shoulder Abduction AAROM with Dowel  - 1 x daily - 7 x weekly - 1 sets - 10 reps  - Standing Isometric Shoulder Abduction with Doorway - Arm Bent  - 1 x daily - 7 x weekly - 2 sets - 10 reps - 3-5 hold  - Isometric Shoulder Adduction  - 1 x daily - 7 x weekly - 2 sets - 10 reps - 3-5 hold  - Standing Isometric Shoulder Flexion with Doorway - Arm Bent  - 1 x daily - 7 x weekly - 2 sets - 10 reps - 3-5 hold  - Standing Isometric Shoulder Extension with Doorway - Arm Bent  - 1 x daily - 7 x weekly - 2 sets - 10 reps - 3-5 hold       Assessment:  Pt. With fair tolerance with TE, cues needed with form.  Fatigue noted with stabilization exercises.  Pt. Continues with swelling along the deltoid region.  IASTM applied to affected area with light pressure.  Pt. States shoulder still feels sore after session.       Plan:  Continue with strengthening, ROM and flexibility per tolerance to improve daily household duties with little to no difficulty.  MB     OP PT Plan  Treatment/Interventions: Cryotherapy, Dry needling, Education/ Instruction, Electrical stimulation, Hot pack, Manual therapy, Self care/ home management, Therapeutic exercises (IASTM/cupping)  PT Plan: Skilled PT  PT Frequency: 2 times per week  Duration: 4wks  Onset  "Date: 09/21/23  Certification Period Start Date: 02/23/24  Certification Period End Date: 05/23/24  Rehab Potential: Good  Plan of Care Agreement: Patient              Goals:  Active       PT Problem       PT Goal 1 (Progressing)       Start:  01/17/24    Expected End:  05/06/24       1. Independent HEP to allow for 50% reduction in max ADL C/C sx ( 5/10) 2-3wks  2/23/24: Reports at least 50% reduction in ADL issues  3/25/24: MET  2. 0/10 night time sx to allow for uninterrupted sleep x 1wk ( 7/7) 2-3wks  2/23/24: 2/10 avg pain, waking 2/7 nights per week  3/25/24:  2/10 avg pain, waking 2/7 nights per week  3. Survey score improvement from 36% to 25% (QDI) 3-4wks  2/23/24: QuickDash score 25%  3/25/24: 18.18%-MET  4. ROM increase to allow for improved ADL reaching (from 85 to 135 active elevation) 3-4wks  2/23/24: 160 deg flex, 115 abd but still has pain  3/25/24: 160 deg flex, abd 150 deg AROM; 180 deg both directions passively--PARTIALLY MET  5. Strength increase to allow for improved ADL object handling (from 4-/5 to 4+/5 R shldr) 3-4wks  3/25/24: flex 4/5, abd 4-/5, IR 4+/5, ER 4/5         PT Goal 2 (Progressing)       Start:  01/17/24    Expected End:  05/06/24       1.\"I want to use my arm normally again\".            "

## 2024-05-10 ENCOUNTER — APPOINTMENT (OUTPATIENT)
Dept: PHYSICAL THERAPY | Facility: CLINIC | Age: 74
End: 2024-05-10
Payer: MEDICARE

## 2024-05-15 ENCOUNTER — TREATMENT (OUTPATIENT)
Dept: PHYSICAL THERAPY | Facility: CLINIC | Age: 74
End: 2024-05-15
Payer: MEDICARE

## 2024-05-15 DIAGNOSIS — M75.41 IMPINGEMENT SYNDROME OF RIGHT SHOULDER: ICD-10-CM

## 2024-05-15 PROCEDURE — 97110 THERAPEUTIC EXERCISES: CPT | Mod: GP,CQ

## 2024-05-15 ASSESSMENT — PAIN - FUNCTIONAL ASSESSMENT: PAIN_FUNCTIONAL_ASSESSMENT: 0-10

## 2024-05-15 ASSESSMENT — PAIN SCALES - GENERAL: PAINLEVEL_OUTOF10: 1

## 2024-05-15 NOTE — PROGRESS NOTES
Physical Therapy Treatment    Patient Name: Shona Hansen  MRN: [id number]  Today's Date: 5/15/2024  Time Calculation  Start Time: 0915  Stop Time: 0956  Time Calculation (min): 41 min  PT Therapeutic Procedures Time Entry  Therapeutic Exercise Time Entry: 39       Assessment:   Patient challenged with resisted ER this date with cues to decrease range with no pain. Patient demo's good understanding. Patient demo's soreness at end of session.     Plan:  Continue to work on improving strength and ROM to be able to perform household chores with little to no difficulty. -AB.     OP PT Plan  Treatment/Interventions: Cryotherapy, Dry needling, Education/ Instruction, Electrical stimulation, Hot pack, Manual therapy, Self care/ home management, Therapeutic exercises (IASTM/cupping)  PT Plan: Skilled PT  PT Frequency: 2 times per week  Duration: 4wks  Onset Date: 09/21/23  Certification Period Start Date: 02/23/24  Certification Period End Date: 05/23/24  Rehab Potential: Good  Plan of Care Agreement: Patient    Current Problem  Problem List Items Addressed This Visit             ICD-10-CM    Impingement syndrome of right shoulder M75.41       Subjective   General  Reason for Referral: R shldr impingement  Referred By: Marianne Peck CNP  General Comment: Visit 20,  3/8 POC  HEP: Yes   Patient states that she typically has pain when she is moving her RUE out to the side, or across her body.    Precautions  Precautions  Precautions Comment: none, per patient she had blader surgery 01/22/24 she reported she is not alowed to lift untill she see the doctor 02/02/24=cleared to lift now    Pain  Pain Assessment: 0-10  Pain Score: 1  Pain Location: Shoulder    Objective     Treatments:  Therapeutic Exercise: 39 minutes, 3 units  UBE 3' fwd 3' bwd  Pulley 3' fwd 3' scap   BOSU rocks Fwd/Bkwd/Side to side/CW/CCW 2 x 10 (P, motions)  CKC weight shifts 2 x 10   Wall protractions 2 x 10   Ball on wall 2x 10    - flex   - Med/lat x 10    "-CW/CCW x10 ea  Resisted Rows 2 x 10 pink tube  Standing GH extension 2x10 Pink tube   Standing ER green Tband 2x10 R (N)  Standing IR Blue tband 2x10 R (N)  Stabilize and reach 3 dir-yellow loop 2 x 5   Shoulder seated flex 2 x 10 1# (P to standing)   Shoulder seated abd 2 x 10 1# (P to standing)     resumed  Uni shoulder ext orange band 2 x 10 (X)   Shoulder with t-band orange band 2 steps 2 x 10  active   -Flex/ext/abd/add x10    H abd/H add-yellow band x 10   IR Strap stretch x10 10\" hold   S/L ABD with scap retraction x10 (X) no time  S/L Flex with scap retraction x10 (X) no time   Applied KT to bicep and shoulder today 1 Y strip on bicep and 1 strip on shoulder (X) no  time     Not this date.   Supine protraction 2 x 10 (X)  Wall slide x10 5\" hold (X)  Wall flex with bolster 2 x 10 (X)  Supine cane (X)  -flex 2 x 10   -press up 2 x 10   -ER 2 x 10   -abd 2 x 10   Supine abc 1 cycle (X)  1/2 foam roll (X per patient)  - pec stretch  - hugs  - backstroke        Manual Therapy Not today 5-15-24  PROM and STW to R UE   (X)  Cupping to upper R UE bicep area static and dynamic movements (X)   [facility]    OP EDUCATION:   Access Code: [email or web address]  URL: [email or web address]  Date: 03/29/2024  Prepared by: Rosa M Mcneal     Exercises  - Standing Plank on Wall with Reaches and Resistance  - 1 x daily - 7 x weekly - 2 sets - 10 reps  - Standing Shoulder Flexion with Dumbbells  - 1 x daily - 7 x weekly - 2 sets - 10 reps  - Shoulder Abduction with Dumbbells - Palms Down  - 1 x daily - 7 x weekly - 2 sets - 10 reps  - Shoulder External Rotation with Anchored Resistance with Towel Under Elbow  - 1 x daily - 7 x weekly - 2 sets - 10 reps  - Standing Shoulder Internal Rotation with Anchored Resistance  - 1 x daily - 7 x weekly - 2 sets - 10 reps  - Standing Shoulder Horizontal Abduction with Resistance  - 1 x daily - 7 x weekly - 2 sets - 10 reps  Access Code: [email or web address]  URL: " [email or web address]  Date: 02/16/2024  Prepared by: Michelle Cao     Exercises  - Standing Shoulder Flexion Reactive Isometric  - 1 x daily - 7 x weekly - 2 sets - 10 reps  - Standing Shoulder Row Reactive Isometric  - 1 x daily - 7 x weekly - 2 sets - 10 reps  - Shoulder Internal Rotation Reactive Isometrics  - 1 x daily - 7 x weekly - 2 sets - 10 reps  - Shoulder External Rotation Reactive Isometrics  - 1 x daily - 7 x weekly - 2 sets - 10 reps  Access Code: [email or web address]  URL: [email or web address]  Date: 02/12/2024  Prepared by: Rosa M Mcneal     Exercises  - Standing Row with Anchored Resistance  - 1 x daily - 7 x weekly - 2 sets - 10 reps  - Shoulder Extension with Resistance - Palms Forward  - 1 x daily - 7 x weekly - 2 sets - 10 reps  Access Code: [email or web address]  URL: [email or web address]  Date: 01/26/2024  Prepared by: Michelle Cao     Exercises  - Seated Shoulder Flexion AAROM with Pulley Behind  - 1 x daily - 7 x weekly - 2 sets - 10 reps - 3-5 hold  - Seated Shoulder Abduction AAROM with Pulley Behind  - 1 x daily - 7 x weekly - 2 sets - 10 reps - 3-5 hold  - Shoulder Flexion Wall Slide with Towel  - 1 x daily - 7 x weekly - 1 sets - 10 reps - 5-10 hold  - Supine Shoulder External Rotation with Dowel  - 1 x daily - 7 x weekly - 1 sets - 10 reps  - Supine Shoulder Flexion Extension AAROM with Dowel  - 1 x daily - 7 x weekly - 1 sets - 10 reps  - Supine Shoulder Press with Dowel  - 1 x daily - 7 x weekly - 1 sets - 10 reps  - Supine Shoulder Abduction AAROM with Dowel  - 1 x daily - 7 x weekly - 1 sets - 10 reps  - Standing Isometric Shoulder Abduction with Doorway - Arm Bent  - 1 x daily - 7 x weekly - 2 sets - 10 reps - 3-5 hold  - Isometric Shoulder Adduction  - 1 x daily - 7 x weekly - 2 sets - 10 reps - 3-5 hold  - Standing Isometric Shoulder Flexion with Doorway - Arm Bent  - 1 x daily - 7 x weekly - 2 sets - 10 reps - 3-5 hold  -  "Standing Isometric Shoulder Extension with Doorway - Arm Bent  - 1 x daily - 7 x weekly - 2 sets - 10 reps - 3-5 hold    Goals:  Active       PT Problem       PT Goal 1 (Progressing)       Start:  01/17/24    Expected End:  05/06/24       1. Independent HEP to allow for 50% reduction in max ADL C/C sx ( 5/10) 2-3wks  2/23/24: Reports at least 50% reduction in ADL issues  3/25/24: MET  2. 0/10 night time sx to allow for uninterrupted sleep x 1wk ( 7/7) 2-3wks  2/23/24: 2/10 avg pain, waking 2/7 nights per week  3/25/24:  2/10 avg pain, waking 2/7 nights per week  3. Survey score improvement from 36% to 25% (QDI) 3-4wks  2/23/24: QuickDash score 25%  3/25/24: 18.18%-MET  4. ROM increase to allow for improved ADL reaching (from 85 to 135 active elevation) 3-4wks  2/23/24: 160 deg flex, 115 abd but still has pain  3/25/24: 160 deg flex, abd 150 deg AROM; 180 deg both directions passively--PARTIALLY MET  5. Strength increase to allow for improved ADL object handling (from 4-/5 to 4+/5 R shldr) 3-4wks  3/25/24: flex 4/5, abd 4-/5, IR 4+/5, ER 4/5         PT Goal 2 (Progressing)       Start:  01/17/24    Expected End:  05/06/24       1.\"I want to use my arm normally again\".            "

## 2024-05-17 ENCOUNTER — TREATMENT (OUTPATIENT)
Dept: PHYSICAL THERAPY | Facility: CLINIC | Age: 74
End: 2024-05-17
Payer: MEDICARE

## 2024-05-17 DIAGNOSIS — M75.41 IMPINGEMENT SYNDROME OF RIGHT SHOULDER: ICD-10-CM

## 2024-05-17 PROCEDURE — 97140 MANUAL THERAPY 1/> REGIONS: CPT | Mod: GP,CQ

## 2024-05-17 PROCEDURE — 97110 THERAPEUTIC EXERCISES: CPT | Mod: GP,CQ

## 2024-05-17 ASSESSMENT — PAIN - FUNCTIONAL ASSESSMENT: PAIN_FUNCTIONAL_ASSESSMENT: 0-10

## 2024-05-17 ASSESSMENT — PAIN SCALES - GENERAL: PAINLEVEL_OUTOF10: 1

## 2024-05-17 NOTE — PROGRESS NOTES
Physical Therapy Treatment    Patient Name: Shona Hansen  MRN: [id number]  Today's Date: 5/17/2024  Time Calculation  Start Time: 1005  Stop Time: 1049  Time Calculation (min): 44 min  PT Therapeutic Procedures Time Entry  Manual Therapy Time Entry: 15  Therapeutic Exercise Time Entry: 28                 General:  General  Reason for Referral: R shldr impingement  Referred By: Marianne Peck CNP  General Comment: Visit 21,  4/8 POC    Assessment:   Continues to guard with CKC PRE's.  Swelling continues in bicep area with myofascial restrictions present during IASTM use.   Exacerbation of pain with PRE completion.     Objective:   Palpable spasms in the bicep tendon with point tenderness along the bicipital groove.       Plan:  Plan to progress strength as pain allows and continue with STW for decreased symptoms and decreased myofascial restrictions.     OP PT Plan  Treatment/Interventions: Cryotherapy, Dry needling, Education/ Instruction, Electrical stimulation, Hot pack, Manual therapy, Self care/ home management, Therapeutic exercises (IASTM/cupping)  PT Plan: Skilled PT  PT Frequency: 2 times per week  Duration: 4wks  Onset Date: 09/21/23  Certification Period Start Date: 02/23/24  Certification Period End Date: 05/23/24  Rehab Potential: Good  Plan of Care Agreement: Patient    Current Problem  Problem List Items Addressed This Visit             ICD-10-CM    Impingement syndrome of right shoulder M75.41       Subjective Patient identified by name and date of birth.   Patient is compliant with HEP completion with no issues to report at this time.           Precautions  Precautions  Precautions Comment: none, per patient she had blader surgery 01/22/24 she reported she is not alowed to lift untill she see the doctor 02/02/24=cleared to lift now    Pain  Pain Assessment: 0-10  Pain Score: 1  Pain Location: Shoulder    Treatments:  UBE 3' fwd 3' bwd  Pulley 3' fwd 3' scap   BOSU rocks Fwd/Bkwd/Side to side/CW/CCW 2 x  "10   CKC weight shifts 2 x 10   Wall protractions 2 x 10   Ball on wall 2x 10    - flex   - Med/lat x 10   -CW/CCW x10 ea  Resisted Rows 2 x 10 pink tube  Standing GH extension 2x10 Pink tube   Standing ER green Tband 2x10 R (N)  Standing IR Blue tband 2x10 R (N)  Stabilize and reach 3 dir-yellow loop 2 x 5   Shoulder seated flex 2 x 10 1# (P to standing)   Shoulder seated abd 2 x 10 1# (P to standing)      resumed  Uni shoulder ext orange band 2 x 10 (X)   Shoulder with t-band orange band 2 steps 2 x 10  active   -Flex/ext/abd/add x10    H abd/H add-yellow band x 10   IR Strap stretch x10 10\" hold   S/L ABD with scap retraction x10 (X) no time  S/L Flex with scap retraction x10 (X) no time   Applied KT to bicep and shoulder today 1 Y strip on bicep and 1 strip on shoulder (X) no  time     Not this date.   Supine protraction 2 x 10 (X)  Wall slide x10 5\" hold (X)  Wall flex with bolster 2 x 10 (X)  Supine cane (X)  -flex 2 x 10   -press up 2 x 10   -ER 2 x 10   -abd 2 x 10   Supine abc 1 cycle (X)  1/2 foam roll (X per patient)  - pec stretch  - hugs  - backstroke        Manual Therapy Not today 5-15-24  PROM and STW to R UE   (X)  Cupping to upper R UE bicep area static and dynamic movements (X)   [facility]    OP EDUCATION:    Access Code: [email or web address]  URL: [email or web address]  Date: 03/29/2024  Prepared by: Rosa M Mcneal     Exercises  - Standing Plank on Wall with Reaches and Resistance  - 1 x daily - 7 x weekly - 2 sets - 10 reps  - Standing Shoulder Flexion with Dumbbells  - 1 x daily - 7 x weekly - 2 sets - 10 reps  - Shoulder Abduction with Dumbbells - Palms Down  - 1 x daily - 7 x weekly - 2 sets - 10 reps  - Shoulder External Rotation with Anchored Resistance with Towel Under Elbow  - 1 x daily - 7 x weekly - 2 sets - 10 reps  - Standing Shoulder Internal Rotation with Anchored Resistance  - 1 x daily - 7 x weekly - 2 sets - 10 reps  - Standing Shoulder Horizontal Abduction with Resistance "  - 1 x daily - 7 x weekly - 2 sets - 10 reps  Access Code: [email or web address]  URL: [email or web address]  Date: 02/16/2024  Prepared by: Michelle Cao     Exercises  - Standing Shoulder Flexion Reactive Isometric  - 1 x daily - 7 x weekly - 2 sets - 10 reps  - Standing Shoulder Row Reactive Isometric  - 1 x daily - 7 x weekly - 2 sets - 10 reps  - Shoulder Internal Rotation Reactive Isometrics  - 1 x daily - 7 x weekly - 2 sets - 10 reps  - Shoulder External Rotation Reactive Isometrics  - 1 x daily - 7 x weekly - 2 sets - 10 reps  Access Code: [email or web address]  URL: [email or web address]  Date: 02/12/2024  Prepared by: Rosa M Mcneal     Exercises  - Standing Row with Anchored Resistance  - 1 x daily - 7 x weekly - 2 sets - 10 reps  - Shoulder Extension with Resistance - Palms Forward  - 1 x daily - 7 x weekly - 2 sets - 10 reps  Access Code: [email or web address]  URL: [email or web address]  Date: 01/26/2024  Prepared by: Michelle Cao     Exercises  - Seated Shoulder Flexion AAROM with Pulley Behind  - 1 x daily - 7 x weekly - 2 sets - 10 reps - 3-5 hold  - Seated Shoulder Abduction AAROM with Pulley Behind  - 1 x daily - 7 x weekly - 2 sets - 10 reps - 3-5 hold  - Shoulder Flexion Wall Slide with Towel  - 1 x daily - 7 x weekly - 1 sets - 10 reps - 5-10 hold  - Supine Shoulder External Rotation with Dowel  - 1 x daily - 7 x weekly - 1 sets - 10 reps  - Supine Shoulder Flexion Extension AAROM with Dowel  - 1 x daily - 7 x weekly - 1 sets - 10 reps  - Supine Shoulder Press with Dowel  - 1 x daily - 7 x weekly - 1 sets - 10 reps  - Supine Shoulder Abduction AAROM with Dowel  - 1 x daily - 7 x weekly - 1 sets - 10 reps  - Standing Isometric Shoulder Abduction with Doorway - Arm Bent  - 1 x daily - 7 x weekly - 2 sets - 10 reps - 3-5 hold  - Isometric Shoulder Adduction  - 1 x daily - 7 x weekly - 2 sets - 10 reps - 3-5 hold  - Standing Isometric Shoulder Flexion with Doorway -  "Arm Bent  - 1 x daily - 7 x weekly - 2 sets - 10 reps - 3-5 hold  - Standing Isometric Shoulder Extension with Doorway - Arm Bent  - 1 x daily - 7 x weekly - 2 sets - 10 reps - 3-5 hold    Goals:  Active       PT Problem       PT Goal 1 (Progressing)       Start:  01/17/24    Expected End:  05/06/24       1. Independent HEP to allow for 50% reduction in max ADL C/C sx ( 5/10) 2-3wks  2/23/24: Reports at least 50% reduction in ADL issues  3/25/24: MET  2. 0/10 night time sx to allow for uninterrupted sleep x 1wk ( 7/7) 2-3wks  2/23/24: 2/10 avg pain, waking 2/7 nights per week  3/25/24:  2/10 avg pain, waking 2/7 nights per week  3. Survey score improvement from 36% to 25% (QDI) 3-4wks  2/23/24: QuickDash score 25%  3/25/24: 18.18%-MET  4. ROM increase to allow for improved ADL reaching (from 85 to 135 active elevation) 3-4wks  2/23/24: 160 deg flex, 115 abd but still has pain  3/25/24: 160 deg flex, abd 150 deg AROM; 180 deg both directions passively--PARTIALLY MET  5. Strength increase to allow for improved ADL object handling (from 4-/5 to 4+/5 R shldr) 3-4wks  3/25/24: flex 4/5, abd 4-/5, IR 4+/5, ER 4/5         PT Goal 2 (Progressing)       Start:  01/17/24    Expected End:  05/06/24       1.\"I want to use my arm normally again\".            "

## 2024-05-22 ENCOUNTER — TREATMENT (OUTPATIENT)
Dept: PHYSICAL THERAPY | Facility: CLINIC | Age: 74
End: 2024-05-22
Payer: MEDICARE

## 2024-05-22 DIAGNOSIS — M75.41 IMPINGEMENT SYNDROME OF RIGHT SHOULDER: ICD-10-CM

## 2024-05-22 PROCEDURE — 97140 MANUAL THERAPY 1/> REGIONS: CPT | Mod: GP,CQ

## 2024-05-22 ASSESSMENT — PAIN SCALES - GENERAL: PAINLEVEL_OUTOF10: 1

## 2024-05-22 ASSESSMENT — PAIN - FUNCTIONAL ASSESSMENT: PAIN_FUNCTIONAL_ASSESSMENT: 0-10

## 2024-05-22 NOTE — PROGRESS NOTES
Physical Therapy Treatment    Patient Name: Shona Hansen  MRN: [id number]  Today's Date: 5/22/2024  Time Calculation  Start Time: 1048  Stop Time: 1130  Time Calculation (min): 42 min  PT Therapeutic Procedures Time Entry  Manual Therapy Time Entry: 39                 General:  General  Reason for Referral: R shldr impingement  Referred By: Marianne Peck CNP  General Comment: Visit 22,  5/8 POC    Assessment:   Focused on IASTM today per patient stating MD didn't want her doing exercise today.  Decreased swelling observed in upper arm region.       Objective:   Decreased myofascial restrictions today with palpation.   Patient did have bruise in the area of injection site.     Plan:  Plan to reintroduce strengthening next visit. JA    OP PT Plan  Treatment/Interventions: Cryotherapy, Dry needling, Education/ Instruction, Electrical stimulation, Hot pack, Manual therapy, Self care/ home management, Therapeutic exercises (IASTM/cupping)  PT Plan: Skilled PT  PT Frequency: 2 times per week  Duration: 4wks  Onset Date: 09/21/23  Certification Period Start Date: 02/23/24  Certification Period End Date: 05/23/24  Rehab Potential: Good  Plan of Care Agreement: Patient    Current Problem  Problem List Items Addressed This Visit             ICD-10-CM    Impingement syndrome of right shoulder M75.41       Subjective Patient identified by name and date of birth.   Patient is compliant with HEP completion with no issues to report at this time.  States that she got an injection in the shoulder 2 days ago. States it is helping so far. Denies any contraindications with therapy today since the injection but did say MD didn't really want her doing exercises for about a week but was ok with the STW. Returns in 4 months for follow up with MD.     Precautions  Precautions  Precautions Comment: none, per patient she had blader surgery 01/22/24 she reported she is not alowed to lift untill she see the doctor 02/02/24=cleared to lift  "now    Pain  Pain Assessment: 0-10  Pain Score: 1  Pain Location: Shoulder    Treatments:  Therex Not today (X)   UBE 3' fwd 3' bwd  Pulley 3' fwd 3' scap   BOSU rocks Fwd/Bkwd/Side to side/CW/CCW 2 x 10   CKC weight shifts 2 x 10   Wall protractions 2 x 10   Ball on wall 2x 10    - flex   - Med/lat x 10   -CW/CCW x10 ea  Resisted Rows 2 x 10 pink tube  Standing GH extension 2x10 Pink tube   Standing ER green Tband 2x10 R (N)  Standing IR Blue tband 2x10 R (N)  Stabilize and reach 3 dir-yellow loop 2 x 5   Shoulder seated flex 2 x 10 1# (P to standing)   Shoulder seated abd 2 x 10 1# (P to standing)      resumed  Uni shoulder ext orange band 2 x 10 (X)   Shoulder with t-band orange band 2 steps 2 x 10  active   -Flex/ext/abd/add x10    H abd/H add-yellow band x 10   IR Strap stretch x10 10\" hold   S/L ABD with scap retraction x10 (X) no time  S/L Flex with scap retraction x10 (X) no time   Applied KT to bicep and shoulder today 1 Y strip on bicep and 1 strip on shoulder (X) no  time     Not this date.   Supine protraction 2 x 10 (X)  Wall slide x10 5\" hold (X)  Wall flex with bolster 2 x 10 (X)  Supine cane (X)  -flex 2 x 10   -press up 2 x 10   -ER 2 x 10   -abd 2 x 10   Supine abc 1 cycle (X)  1/2 foam roll (X per patient)  - pec stretch  - hugs  - backstroke        Manual Therapy 39'   PROM and STW to R UE   (X)  Cupping to upper R UE bicep area static and dynamic movements (X)   [facility]    OP EDUCATION:   Access Code: [email or web address]  URL: [email or web address]  Date: 03/29/2024  Prepared by: Rosa M Mcneal     Exercises  - Standing Plank on Wall with Reaches and Resistance  - 1 x daily - 7 x weekly - 2 sets - 10 reps  - Standing Shoulder Flexion with Dumbbells  - 1 x daily - 7 x weekly - 2 sets - 10 reps  - Shoulder Abduction with Dumbbells - Palms Down  - 1 x daily - 7 x weekly - 2 sets - 10 reps  - Shoulder External Rotation with Anchored Resistance with Towel Under Elbow  - 1 x daily - 7 x " weekly - 2 sets - 10 reps  - Standing Shoulder Internal Rotation with Anchored Resistance  - 1 x daily - 7 x weekly - 2 sets - 10 reps  - Standing Shoulder Horizontal Abduction with Resistance  - 1 x daily - 7 x weekly - 2 sets - 10 reps  Access Code: [email or web address]  URL: [email or web address]  Date: 02/16/2024  Prepared by: Michelle Cao     Exercises  - Standing Shoulder Flexion Reactive Isometric  - 1 x daily - 7 x weekly - 2 sets - 10 reps  - Standing Shoulder Row Reactive Isometric  - 1 x daily - 7 x weekly - 2 sets - 10 reps  - Shoulder Internal Rotation Reactive Isometrics  - 1 x daily - 7 x weekly - 2 sets - 10 reps  - Shoulder External Rotation Reactive Isometrics  - 1 x daily - 7 x weekly - 2 sets - 10 reps  Access Code: [email or web address]  URL: [email or web address]  Date: 02/12/2024  Prepared by: Rosa M Mcneal     Exercises  - Standing Row with Anchored Resistance  - 1 x daily - 7 x weekly - 2 sets - 10 reps  - Shoulder Extension with Resistance - Palms Forward  - 1 x daily - 7 x weekly - 2 sets - 10 reps  Access Code: [email or web address]  URL: [email or web address]  Date: 01/26/2024  Prepared by: Michelle Cao     Exercises  - Seated Shoulder Flexion AAROM with Pulley Behind  - 1 x daily - 7 x weekly - 2 sets - 10 reps - 3-5 hold  - Seated Shoulder Abduction AAROM with Pulley Behind  - 1 x daily - 7 x weekly - 2 sets - 10 reps - 3-5 hold  - Shoulder Flexion Wall Slide with Towel  - 1 x daily - 7 x weekly - 1 sets - 10 reps - 5-10 hold  - Supine Shoulder External Rotation with Dowel  - 1 x daily - 7 x weekly - 1 sets - 10 reps  - Supine Shoulder Flexion Extension AAROM with Dowel  - 1 x daily - 7 x weekly - 1 sets - 10 reps  - Supine Shoulder Press with Dowel  - 1 x daily - 7 x weekly - 1 sets - 10 reps  - Supine Shoulder Abduction AAROM with Dowel  - 1 x daily - 7 x weekly - 1 sets - 10 reps  - Standing Isometric Shoulder Abduction with Doorway - Arm Bent  - 1  "x daily - 7 x weekly - 2 sets - 10 reps - 3-5 hold  - Isometric Shoulder Adduction  - 1 x daily - 7 x weekly - 2 sets - 10 reps - 3-5 hold  - Standing Isometric Shoulder Flexion with Doorway - Arm Bent  - 1 x daily - 7 x weekly - 2 sets - 10 reps - 3-5 hold  - Standing Isometric Shoulder Extension with Doorway - Arm Bent  - 1 x daily - 7 x weekly - 2 sets - 10 reps - 3-5 hold    Goals:  Active       PT Problem       PT Goal 1 (Progressing)       Start:  01/17/24    Expected End:  05/06/24       1. Independent HEP to allow for 50% reduction in max ADL C/C sx ( 5/10) 2-3wks  2/23/24: Reports at least 50% reduction in ADL issues  3/25/24: MET  2. 0/10 night time sx to allow for uninterrupted sleep x 1wk ( 7/7) 2-3wks  2/23/24: 2/10 avg pain, waking 2/7 nights per week  3/25/24:  2/10 avg pain, waking 2/7 nights per week  3. Survey score improvement from 36% to 25% (QDI) 3-4wks  2/23/24: QuickDash score 25%  3/25/24: 18.18%-MET  4. ROM increase to allow for improved ADL reaching (from 85 to 135 active elevation) 3-4wks  2/23/24: 160 deg flex, 115 abd but still has pain  3/25/24: 160 deg flex, abd 150 deg AROM; 180 deg both directions passively--PARTIALLY MET  5. Strength increase to allow for improved ADL object handling (from 4-/5 to 4+/5 R shldr) 3-4wks  3/25/24: flex 4/5, abd 4-/5, IR 4+/5, ER 4/5         PT Goal 2 (Progressing)       Start:  01/17/24    Expected End:  05/06/24       1.\"I want to use my arm normally again\".            "

## 2024-05-24 ENCOUNTER — TREATMENT (OUTPATIENT)
Dept: PHYSICAL THERAPY | Facility: CLINIC | Age: 74
End: 2024-05-24
Payer: MEDICARE

## 2024-05-24 DIAGNOSIS — M75.41 IMPINGEMENT SYNDROME OF RIGHT SHOULDER: ICD-10-CM

## 2024-05-24 PROCEDURE — 97110 THERAPEUTIC EXERCISES: CPT | Mod: GP,CQ

## 2024-05-24 PROCEDURE — 97140 MANUAL THERAPY 1/> REGIONS: CPT | Mod: GP,CQ

## 2024-05-24 ASSESSMENT — PAIN SCALES - GENERAL: PAINLEVEL_OUTOF10: 1

## 2024-05-24 ASSESSMENT — PAIN - FUNCTIONAL ASSESSMENT: PAIN_FUNCTIONAL_ASSESSMENT: 0-10

## 2024-05-24 NOTE — PROGRESS NOTES
Physical Therapy Treatment    Patient Name: Shona Hansen  MRN: [id number]  Today's Date: 5/24/2024  Time Calculation  Start Time: 0918  Stop Time: 1000  Time Calculation (min): 42 min  PT Therapeutic Procedures Time Entry  Manual Therapy Time Entry: 25  Therapeutic Exercise Time Entry: 15                 General:  General  Reason for Referral: R shldr impingement  Referred By: Marianne Peck CNP  General Comment: Visit 23,  6/8 POC    Assessment:   Reintroduced some PRE's today with no exacerbation of pain symptoms.   Still has bruising from injection from earlier in the week.   Improved CKC stability with exercises.       Objective:   MMT: bicep flexion 4/5      Plan:  Plan to progress shoulder strength as able for improved function daily. JA     OP PT Plan  Treatment/Interventions: Cryotherapy, Dry needling, Education/ Instruction, Electrical stimulation, Hot pack, Manual therapy, Self care/ home management, Therapeutic exercises (IASTM/cupping)  PT Plan: Skilled PT  PT Frequency: 2 times per week  Duration: 4wks  Onset Date: 09/21/23  Certification Period Start Date: 02/23/24  Certification Period End Date: 05/23/24  Rehab Potential: Good  Plan of Care Agreement: Patient    Current Problem  Problem List Items Addressed This Visit             ICD-10-CM    Impingement syndrome of right shoulder M75.41       Subjective Patient identified by name and date of birth.   Patient is compliant with HEP completion with no issues to report at this time.  States that she still has bruising.   Feels that the shot is helping.     Precautions  Precautions  Precautions Comment: none, per patient she had blader surgery 01/22/24 she reported she is not alowed to lift untill she see the doctor 02/02/24=cleared to lift now    Pain  Pain Assessment: 0-10  Pain Score: 1  Pain Location: Shoulder    Treatments:  Therex 15'  UBE 3' fwd 3' bwd  Pulley 3' fwd 3' scap   BOSU rocks Fwd/Bkwd/Side to side/CW/CCW 2 x 10   CKC weight shifts 2 x 10  "  Wall protractions 2 x 10   Ball on wall 2x 10 2# ball    - flex   - Med/lat x 10   -CW/CCW x10 ea    Not today (X)   Resisted Rows 2 x 10 pink tube  Standing GH extension 2x10 Pink tube   Standing ER green Tband 2x10 R (N)  Standing IR Blue tband 2x10 R (N)  Stabilize and reach 3 dir-yellow loop 2 x 5   Shoulder seated flex 2 x 10 1# (P to standing)   Shoulder seated abd 2 x 10 1# (P to standing)    Uni shoulder ext orange band 2 x 10 (X)   Shoulder with t-band orange band 2 steps 2 x 10  active   -Flex/ext/abd/add x10    H abd/H add-yellow band x 10   IR Strap stretch x10 10\" hold   S/L ABD with scap retraction x10 (X) no time  S/L Flex with scap retraction x10 (X) no time   Applied KT to bicep and shoulder today 1 Y strip on bicep and 1 strip on shoulder (X) no  time     Not this date   Supine protraction 2 x 10 (X)  Wall slide x10 5\" hold (X)  Wall flex with bolster 2 x 10 (X)  Supine cane (X)  -flex 2 x 10   -press up 2 x 10   -ER 2 x 10   -abd 2 x 10   Supine abc 1 cycle (X)  1/2 foam roll (X per patient)  - pec stretch  - hugs  - backstroke        Manual Therapy 25'   PROM and STW to R UE   (X)  Cupping to upper R UE bicep area static and dynamic movements (X)   [facility]       OP EDUCATION:   Access Code: [email or web address]  URL: [email or web address]  Date: 03/29/2024  Prepared by: Rosa M Mcneal     Exercises  - Standing Plank on Wall with Reaches and Resistance  - 1 x daily - 7 x weekly - 2 sets - 10 reps  - Standing Shoulder Flexion with Dumbbells  - 1 x daily - 7 x weekly - 2 sets - 10 reps  - Shoulder Abduction with Dumbbells - Palms Down  - 1 x daily - 7 x weekly - 2 sets - 10 reps  - Shoulder External Rotation with Anchored Resistance with Towel Under Elbow  - 1 x daily - 7 x weekly - 2 sets - 10 reps  - Standing Shoulder Internal Rotation with Anchored Resistance  - 1 x daily - 7 x weekly - 2 sets - 10 reps  - Standing Shoulder Horizontal Abduction with Resistance  - 1 x daily - 7 x weekly " - 2 sets - 10 reps  Access Code: [email or web address]  URL: [email or web address]  Date: 02/16/2024  Prepared by: Michelle Cao     Exercises  - Standing Shoulder Flexion Reactive Isometric  - 1 x daily - 7 x weekly - 2 sets - 10 reps  - Standing Shoulder Row Reactive Isometric  - 1 x daily - 7 x weekly - 2 sets - 10 reps  - Shoulder Internal Rotation Reactive Isometrics  - 1 x daily - 7 x weekly - 2 sets - 10 reps  - Shoulder External Rotation Reactive Isometrics  - 1 x daily - 7 x weekly - 2 sets - 10 reps  Access Code: [email or web address]  URL: [email or web address]  Date: 02/12/2024  Prepared by: Rosa M Mcneal     Exercises  - Standing Row with Anchored Resistance  - 1 x daily - 7 x weekly - 2 sets - 10 reps  - Shoulder Extension with Resistance - Palms Forward  - 1 x daily - 7 x weekly - 2 sets - 10 reps  Access Code: [email or web address]  URL: [email or web address]  Date: 01/26/2024  Prepared by: Michelle Cao     Exercises  - Seated Shoulder Flexion AAROM with Pulley Behind  - 1 x daily - 7 x weekly - 2 sets - 10 reps - 3-5 hold  - Seated Shoulder Abduction AAROM with Pulley Behind  - 1 x daily - 7 x weekly - 2 sets - 10 reps - 3-5 hold  - Shoulder Flexion Wall Slide with Towel  - 1 x daily - 7 x weekly - 1 sets - 10 reps - 5-10 hold  - Supine Shoulder External Rotation with Dowel  - 1 x daily - 7 x weekly - 1 sets - 10 reps  - Supine Shoulder Flexion Extension AAROM with Dowel  - 1 x daily - 7 x weekly - 1 sets - 10 reps  - Supine Shoulder Press with Dowel  - 1 x daily - 7 x weekly - 1 sets - 10 reps  - Supine Shoulder Abduction AAROM with Dowel  - 1 x daily - 7 x weekly - 1 sets - 10 reps  - Standing Isometric Shoulder Abduction with Doorway - Arm Bent  - 1 x daily - 7 x weekly - 2 sets - 10 reps - 3-5 hold  - Isometric Shoulder Adduction  - 1 x daily - 7 x weekly - 2 sets - 10 reps - 3-5 hold  - Standing Isometric Shoulder Flexion with Doorway - Arm Bent  - 1 x daily - 7  "x weekly - 2 sets - 10 reps - 3-5 hold  - Standing Isometric Shoulder Extension with Doorway - Arm Bent  - 1 x daily - 7 x weekly - 2 sets - 10 reps - 3-5 hold    Goals:  Active       PT Problem       PT Goal 1 (Progressing)       Start:  01/17/24    Expected End:  05/06/24       1. Independent HEP to allow for 50% reduction in max ADL C/C sx ( 5/10) 2-3wks  2/23/24: Reports at least 50% reduction in ADL issues  3/25/24: MET  2. 0/10 night time sx to allow for uninterrupted sleep x 1wk ( 7/7) 2-3wks  2/23/24: 2/10 avg pain, waking 2/7 nights per week  3/25/24:  2/10 avg pain, waking 2/7 nights per week  3. Survey score improvement from 36% to 25% (QDI) 3-4wks  2/23/24: QuickDash score 25%  3/25/24: 18.18%-MET  4. ROM increase to allow for improved ADL reaching (from 85 to 135 active elevation) 3-4wks  2/23/24: 160 deg flex, 115 abd but still has pain  3/25/24: 160 deg flex, abd 150 deg AROM; 180 deg both directions passively--PARTIALLY MET  5. Strength increase to allow for improved ADL object handling (from 4-/5 to 4+/5 R shldr) 3-4wks  3/25/24: flex 4/5, abd 4-/5, IR 4+/5, ER 4/5         PT Goal 2 (Progressing)       Start:  01/17/24    Expected End:  05/06/24       1.\"I want to use my arm normally again\".            "

## 2024-05-29 ENCOUNTER — TREATMENT (OUTPATIENT)
Dept: PHYSICAL THERAPY | Facility: CLINIC | Age: 74
End: 2024-05-29
Payer: MEDICARE

## 2024-05-29 DIAGNOSIS — M75.41 IMPINGEMENT SYNDROME OF RIGHT SHOULDER: ICD-10-CM

## 2024-05-29 PROCEDURE — 97110 THERAPEUTIC EXERCISES: CPT | Mod: GP,CQ

## 2024-05-29 PROCEDURE — 97140 MANUAL THERAPY 1/> REGIONS: CPT | Mod: GP,CQ

## 2024-05-29 ASSESSMENT — PAIN SCALES - GENERAL: PAINLEVEL_OUTOF10: 0 - NO PAIN

## 2024-05-29 ASSESSMENT — PAIN - FUNCTIONAL ASSESSMENT: PAIN_FUNCTIONAL_ASSESSMENT: 0-10

## 2024-05-29 NOTE — PROGRESS NOTES
"Physical Therapy Treatment    Patient Name: Shona Hansen  MRN: [id number]  Today's Date: 5/29/2024  Time Calculation  Start Time: 1315  Stop Time: 1402  Time Calculation (min): 47 min  PT Therapeutic Procedures Time Entry  Manual Therapy Time Entry: 15  Therapeutic Exercise Time Entry: 25                 General:  General  Reason for Referral: R shldr impingement  Referred By: Marianne Peck CNP  General Comment: Visit 24,  7/8 POC    Assessment:   Was able to focus on more strength progression today.   Increased crepitus today with IASTM along bicep tendon.   Patient continues to have dark bruise along superior aspect of the shoudler.   Able to add in more resistive exercises today.     Objective:   Increased crepitus with manual palpation of distal through proximal bicep.      Plan:  Plan to progress strength as able for improved function with daily tasks. Continue with IASTM for decreased myofascial restrictions.  JA     OP PT Plan  Treatment/Interventions: Cryotherapy, Dry needling, Education/ Instruction, Electrical stimulation, Hot pack, Manual therapy, Self care/ home management, Therapeutic exercises (IASTM/cupping)  PT Plan: Skilled PT  PT Frequency: 2 times per week  Duration: 4wks  Onset Date: 09/21/23  Certification Period Start Date: 02/23/24  Certification Period End Date: 05/23/24  Rehab Potential: Good  Plan of Care Agreement: Patient    Current Problem  Problem List Items Addressed This Visit             ICD-10-CM    Impingement syndrome of right shoulder M75.41       Subjective Patient identified by name and date of birth.   Patient is compliant with HEP completion with no issues to report at this time.  States that she still has the bruise from her injection that is not fading.   Notes that her arm was more sore and \"bumpy.\"    Precautions  Precautions  Precautions Comment: none, per patient she had blader surgery 01/22/24 she reported she is not alowed to lift untill she see the doctor " "02/02/24=cleared to lift now    Pain  Pain Assessment: 0-10  Pain Score: 0 - No pain    Treatments:  Therex 25'  UBE 3' fwd 3' bwd  Pulley 3' fwd 3' scap   BOSU rocks Fwd/Bkwd/Side to side/CW/CCW 2 x 10   CKC weight shifts 2 x 10   Wall protractions 2 x 10   Ball on wall 2x 10 2# ball    - flex   - Med/lat x 10   -CW/CCW x10 ea  Resisted Rows 2 x 10 pink tube (N)  Standing GH extension 2x10 Pink tube (N)       Not today (X)   Standing ER green Tband 2x10 R   Standing IR Blue tband 2x10 R (N)  Stabilize and reach 3 dir-yellow loop 2 x 5   Shoulder seated flex 2 x 10 1# (P to standing)   Shoulder seated abd 2 x 10 1# (P to standing)    Uni shoulder ext orange band 2 x 10 (X)   Shoulder with t-band orange band 2 steps 2 x 10  active   -Flex/ext/abd/add x10    H abd/H add-yellow band x 10   IR Strap stretch x10 10\" hold   S/L ABD with scap retraction x10 (X) no time  S/L Flex with scap retraction x10 (X) no time  Applied KT to bicep and shoulder today 1 Y strip on bicep and 1 strip on shoulder (X) no  time     Not this date   Supine protraction 2 x 10 (X)  Wall slide x10 5\" hold (X)  Wall flex with bolster 2 x 10 (X)  Supine cane (X)  -flex 2 x 10   -press up 2 x 10   -ER 2 x 10   -abd 2 x 10   Supine abc 1 cycle (X)  1/2 foam roll (X per patient)  - pec stretch  - hugs  - backstroke        Manual Therapy 15'   PROM and STW to R UE   (X)  Cupping to upper R UE bicep area static and dynamic movements (X)   IASTM bicep tendon 15'            OP EDUCATION:   Access Code: [email or web address]  URL: [email or web address]  Date: 03/29/2024  Prepared by: Rosa M Mcneal     Exercises  - Standing Plank on Wall with Reaches and Resistance  - 1 x daily - 7 x weekly - 2 sets - 10 reps  - Standing Shoulder Flexion with Dumbbells  - 1 x daily - 7 x weekly - 2 sets - 10 reps  - Shoulder Abduction with Dumbbells - Palms Down  - 1 x daily - 7 x weekly - 2 sets - 10 reps  - Shoulder External Rotation with Anchored Resistance " with Towel Under Elbow  - 1 x daily - 7 x weekly - 2 sets - 10 reps  - Standing Shoulder Internal Rotation with Anchored Resistance  - 1 x daily - 7 x weekly - 2 sets - 10 reps  - Standing Shoulder Horizontal Abduction with Resistance  - 1 x daily - 7 x weekly - 2 sets - 10 reps  Access Code: [email or web address]  URL: [email or web address]  Date: 02/16/2024  Prepared by: Michelle Cao     Exercises  - Standing Shoulder Flexion Reactive Isometric  - 1 x daily - 7 x weekly - 2 sets - 10 reps  - Standing Shoulder Row Reactive Isometric  - 1 x daily - 7 x weekly - 2 sets - 10 reps  - Shoulder Internal Rotation Reactive Isometrics  - 1 x daily - 7 x weekly - 2 sets - 10 reps  - Shoulder External Rotation Reactive Isometrics  - 1 x daily - 7 x weekly - 2 sets - 10 reps  Access Code: [email or web address]  URL: [email or web address]  Date: 02/12/2024  Prepared by: Rosa M Mcneal     Exercises  - Standing Row with Anchored Resistance  - 1 x daily - 7 x weekly - 2 sets - 10 reps  - Shoulder Extension with Resistance - Palms Forward  - 1 x daily - 7 x weekly - 2 sets - 10 reps  Access Code: [email or web address]  URL: [email or web address]  Date: 01/26/2024  Prepared by: Michelle Cao     Exercises  - Seated Shoulder Flexion AAROM with Pulley Behind  - 1 x daily - 7 x weekly - 2 sets - 10 reps - 3-5 hold  - Seated Shoulder Abduction AAROM with Pulley Behind  - 1 x daily - 7 x weekly - 2 sets - 10 reps - 3-5 hold  - Shoulder Flexion Wall Slide with Towel  - 1 x daily - 7 x weekly - 1 sets - 10 reps - 5-10 hold  - Supine Shoulder External Rotation with Dowel  - 1 x daily - 7 x weekly - 1 sets - 10 reps  - Supine Shoulder Flexion Extension AAROM with Dowel  - 1 x daily - 7 x weekly - 1 sets - 10 reps  - Supine Shoulder Press with Dowel  - 1 x daily - 7 x weekly - 1 sets - 10 reps  - Supine Shoulder Abduction AAROM with Dowel  - 1 x daily - 7 x weekly - 1 sets - 10 reps  - Standing Isometric  "Shoulder Abduction with Doorway - Arm Bent  - 1 x daily - 7 x weekly - 2 sets - 10 reps - 3-5 hold  - Isometric Shoulder Adduction  - 1 x daily - 7 x weekly - 2 sets - 10 reps - 3-5 hold  - Standing Isometric Shoulder Flexion with Doorway - Arm Bent  - 1 x daily - 7 x weekly - 2 sets - 10 reps - 3-5 hold  - Standing Isometric Shoulder Extension with Doorway - Arm Bent  - 1 x daily - 7 x weekly - 2 sets - 10 reps - 3-5 hold       Goals:  Active       PT Problem       PT Goal 1 (Progressing)       Start:  01/17/24    Expected End:  05/06/24       1. Independent HEP to allow for 50% reduction in max ADL C/C sx ( 5/10) 2-3wks  2/23/24: Reports at least 50% reduction in ADL issues  3/25/24: MET  2. 0/10 night time sx to allow for uninterrupted sleep x 1wk ( 7/7) 2-3wks  2/23/24: 2/10 avg pain, waking 2/7 nights per week  3/25/24:  2/10 avg pain, waking 2/7 nights per week  3. Survey score improvement from 36% to 25% (QDI) 3-4wks  2/23/24: QuickDash score 25%  3/25/24: 18.18%-MET  4. ROM increase to allow for improved ADL reaching (from 85 to 135 active elevation) 3-4wks  2/23/24: 160 deg flex, 115 abd but still has pain  3/25/24: 160 deg flex, abd 150 deg AROM; 180 deg both directions passively--PARTIALLY MET  5. Strength increase to allow for improved ADL object handling (from 4-/5 to 4+/5 R shldr) 3-4wks  3/25/24: flex 4/5, abd 4-/5, IR 4+/5, ER 4/5         PT Goal 2 (Progressing)       Start:  01/17/24    Expected End:  05/06/24       1.\"I want to use my arm normally again\".            "

## 2024-06-10 ENCOUNTER — TREATMENT (OUTPATIENT)
Dept: PHYSICAL THERAPY | Facility: CLINIC | Age: 74
End: 2024-06-10
Payer: MEDICARE

## 2024-06-10 DIAGNOSIS — M75.41 IMPINGEMENT SYNDROME OF RIGHT SHOULDER: ICD-10-CM

## 2024-06-10 PROCEDURE — 97110 THERAPEUTIC EXERCISES: CPT | Mod: GP

## 2024-06-10 ASSESSMENT — PAIN - FUNCTIONAL ASSESSMENT: PAIN_FUNCTIONAL_ASSESSMENT: 0-10

## 2024-06-10 ASSESSMENT — PAIN SCALES - GENERAL: PAINLEVEL_OUTOF10: 1

## 2024-06-10 NOTE — PROGRESS NOTES
Physical Therapy    Physical Therapy Treatment    Patient Name: Shona Hansen  MRN: [id number]  Today's Date: 6/10/2024  Time Calculation  Start Time: 0930  Stop Time: 1015  Time Calculation (min): 45 min     PT Therapeutic Procedures Time Entry  Therapeutic Exercise Time Entry: 39              Non-Billable Time  Non-billable time: 5  Non-billable time reason: dry needling    Current Problem  Problem List Items Addressed This Visit             ICD-10-CM    Impingement syndrome of right shoulder M75.41        General  Reason for Referral: R shldr impingement  Referred By: Marianne Peck CNP  General Comment: Visit 25,  8/8 POC    Subjective   Patient reports compliance with HEP. Patient notes her shoulder has been doing well until yesterday, she notes that both shoulders are more sore today. She said she spoke to her physician about the fact that her L shoulder has also been bothering her some and he said she could be doing her exercises on both arms so she has been. She notes she cannot pinpoint a certain movement or activity that causes her pain, she can just start having aching in her shoulders at rest which is frustrating for her. She had her second injection a couple of weeks ago.    Precautions  Precautions  Precautions Comment: none, per patient she had blader surgery 01/22/24 she reported she is not alowed to lift untill she see the doctor 02/02/24=cleared to lift now    Pain  Pain Assessment: 0-10  Pain Score: 1  Pain Location: Shoulder    Objective   PROM 180 deg flex 180 deg abd  AROM 165 flex, 180 deg abd   flex 4+/5, abd 4+/5, IR 5/5, ER 4+/5  Myofascial trigger points revealed in the R infraspinatus and supraspinatus by dry needling technique with noted needle fibrillation, local twitch response, reproduction of symptoms, including but not limited to achiness and burning. Noted are atypical tissue morphology characteristics of abnormal density, palpable margins, contacted/fibrotic muscles and fascial tissue  "with resistance to penetration. These characteristics reflect abnormal tissue function, innervation and nervous system communication.    Outcome Measures:    Other Measures  Other Outcome Measures: QuickDash 25%     Treatments:  Therex   UBE 3' fwd 3' bwd  Pulley 3' fwd 3' scap   BOSU rocks Fwd/Bkwd/Side to side/CW/CCW 2 x 10   CKC weight shifts 2 x 10   Wall protractions 2 x 10   Ball on wall 2x 10 2# ball    - flex   - Med/lat x 10   -CW/CCW x10 ea  Resisted Rows 2 x 10 pink tube (N)  Standing GH extension 2x10 Pink tube (N)       Not today (X)   Standing ER green Tband 2x10 R   Standing IR Blue tband 2x10 R (N)  Stabilize and reach 3 dir-yellow loop 2 x 5   Shoulder seated flex 2 x 10 1# (P to standing)   Shoulder seated abd 2 x 10 1# (P to standing)    Uni shoulder ext orange band 2 x 10 (X)   Shoulder with t-band orange band 2 steps 2 x 10  active   -Flex/ext/abd/add x10    H abd/H add-yellow band x 10   IR Strap stretch x10 10\" hold   S/L ABD with scap retraction x10 (X) no time  S/L Flex with scap retraction x10 (X) no time  Applied KT to bicep and shoulder today 1 Y strip on bicep and 1 strip on shoulder (X) no  time     Not this date   Supine protraction 2 x 10 (X)  Wall slide x10 5\" hold (X)  Wall flex with bolster 2 x 10 (X)  Supine cane (X)  -flex 2 x 10   -press up 2 x 10   -ER 2 x 10   -abd 2 x 10   Supine abc 1 cycle (X)  1/2 foam roll (X per patient)  - pec stretch  - hugs  - backstroke        Manual Therapy   PROM and STW to R UE   (X)  Cupping to upper R UE bicep area static and dynamic movements (X)   IASTM bicep tendon 15'     Patient was prepped with 70% Isopropyl alcohol to the sites. Patient consents to treatment today.  Sterile, single use, solid filament needles were inserted and removed at various depths and angles to release tight tissues, improve microcirculation and remove neuro-noxious chemicals via a myofascial twitch response.  Date 6/10/24              Needle length Needle length " Needle length Needle length   infraspinatus 60mm x 2      supraspinatus 60 mm x 1           OP EDUCATION:   Access Code: [email or web address]  URL: [email or web address]  Date: 03/29/2024  Prepared by: Rosa M Mcneal     Exercises  - Standing Plank on Wall with Reaches and Resistance  - 1 x daily - 7 x weekly - 2 sets - 10 reps  - Standing Shoulder Flexion with Dumbbells  - 1 x daily - 7 x weekly - 2 sets - 10 reps  - Shoulder Abduction with Dumbbells - Palms Down  - 1 x daily - 7 x weekly - 2 sets - 10 reps  - Shoulder External Rotation with Anchored Resistance with Towel Under Elbow  - 1 x daily - 7 x weekly - 2 sets - 10 reps  - Standing Shoulder Internal Rotation with Anchored Resistance  - 1 x daily - 7 x weekly - 2 sets - 10 reps  - Standing Shoulder Horizontal Abduction with Resistance  - 1 x daily - 7 x weekly - 2 sets - 10 reps  Access Code: [email or web address]  URL: [email or web address]/  Date: 02/16/2024  Prepared by: Michelle Cao     Exercises  - Standing Shoulder Flexion Reactive Isometric  - 1 x daily - 7 x weekly - 2 sets - 10 reps  - Standing Shoulder Row Reactive Isometric  - 1 x daily - 7 x weekly - 2 sets - 10 reps  - Shoulder Internal Rotation Reactive Isometrics  - 1 x daily - 7 x weekly - 2 sets - 10 reps  - Shoulder External Rotation Reactive Isometrics  - 1 x daily - 7 x weekly - 2 sets - 10 reps  Access Code: [email or web address]  URL: [email or web address]/  Date: 02/12/2024  Prepared by: Rosa M Mcneal     Exercises  - Standing Row with Anchored Resistance  - 1 x daily - 7 x weekly - 2 sets - 10 reps  - Shoulder Extension with Resistance - Palms Forward  - 1 x daily - 7 x weekly - 2 sets - 10 reps  Access Code: [email or web address]  URL: [email or web address] Feed/  Date: 01/26/2024  Prepared by: Michelle Cao     Exercises  - Seated Shoulder Flexion AAROM with Pulley Behind  - 1 x daily - 7 x weekly - 2 sets - 10 reps - 3-5 hold  - Seated Shoulder  Abduction AAROM with Pulley Behind  - 1 x daily - 7 x weekly - 2 sets - 10 reps - 3-5 hold  - Shoulder Flexion Wall Slide with Towel  - 1 x daily - 7 x weekly - 1 sets - 10 reps - 5-10 hold  - Supine Shoulder External Rotation with Dowel  - 1 x daily - 7 x weekly - 1 sets - 10 reps  - Supine Shoulder Flexion Extension AAROM with Dowel  - 1 x daily - 7 x weekly - 1 sets - 10 reps  - Supine Shoulder Press with Dowel  - 1 x daily - 7 x weekly - 1 sets - 10 reps  - Supine Shoulder Abduction AAROM with Dowel  - 1 x daily - 7 x weekly - 1 sets - 10 reps  - Standing Isometric Shoulder Abduction with Doorway - Arm Bent  - 1 x daily - 7 x weekly - 2 sets - 10 reps - 3-5 hold  - Isometric Shoulder Adduction  - 1 x daily - 7 x weekly - 2 sets - 10 reps - 3-5 hold  - Standing Isometric Shoulder Flexion with Doorway - Arm Bent  - 1 x daily - 7 x weekly - 2 sets - 10 reps - 3-5 hold  - Standing Isometric Shoulder Extension with Doorway - Arm Bent  - 1 x daily - 7 x weekly - 2 sets - 10 reps - 3-5 hold    Assessment  Patient verified by name and .   Patient has made good goal directed progress with R shoulder ROM and strength but continues to have lingering pain symptoms in R shoulder. PT recommends that patient trial independent self management of symptoms with trial of HEP only and use of self massage/roller to R shoulder region. Patient will be placed on a hold from PT for up to 30 days to trial independent management, if no return to PT within 30 days, this will serve as her discharge from therapy.    Plan  Treatment/Interventions: Cryotherapy, Dry needling, Education/ Instruction, Electrical stimulation, Hot pack, Manual therapy, Self care/ home management, Therapeutic exercises (IASTM/cupping)  PT Plan: Other (Comment) (placed on hold for trial of independent managment)  Onset Date: 23  Certification Period Start Date: 24  Certification Period End Date: 24  Rehab Potential: Good  Plan of Care Agreement:  "Patient  hold from PT for up to 30 days to trial independent management, if no return to PT within 30 days, this will serve as her discharge from therapy.    Active       PT Problem       PT Goal 1 (Progressing)       Start:  01/17/24    Expected End:  05/06/24       1. Independent HEP to allow for 50% reduction in max ADL C/C sx ( 5/10) 2-3wks  2/23/24: Reports at least 50% reduction in ADL issues  3/25/24: MET  2. 0/10 night time sx to allow for uninterrupted sleep x 1wk ( 7/7) 2-3wks  2/23/24: 2/10 avg pain, waking 2/7 nights per week  3/25/24:  2/10 avg pain, waking 2/7 nights per week  6/10/24: avg pain 102/10, waking 1-2 nights per week  3. Survey score improvement from 36% to 25% (QDI) 3-4wks  2/23/24: QuickDash score 25%  3/25/24: 18.18%-MET  4. ROM increase to allow for improved ADL reaching (from 85 to 135 active elevation) 3-4wks  2/23/24: 160 deg flex, 115 abd but still has pain  3/25/24: 160 deg flex, abd 150 deg AROM; 180 deg both directions passively--PARTIALLY MET  6/10/24: PROM 180 deg flex/abd   5. Strength increase to allow for improved ADL object handling (from 4-/5 to 4+/5 R shldr) 3-4wks  3/25/24: flex 4/5, abd 4-/5, IR 4+/5, ER 4/5  6/10/24: flex 4+/5, abd 4+/5, IR 5/5, ER 4+/5         PT Goal 2 (Progressing)       Start:  01/17/24    Expected End:  05/06/24       1.\"I want to use my arm normally again\".           "

## 2024-07-08 ENCOUNTER — APPOINTMENT (OUTPATIENT)
Dept: PHYSICAL THERAPY | Facility: CLINIC | Age: 74
End: 2024-07-08
Payer: MEDICARE

## 2024-07-15 ENCOUNTER — DOCUMENTATION (OUTPATIENT)
Dept: PHYSICAL THERAPY | Facility: CLINIC | Age: 74
End: 2024-07-15
Payer: MEDICARE

## 2024-07-15 NOTE — PROGRESS NOTES
Physical Therapy    Discharge Summary    Name: Shona Hansen  MRN: [id number]  : 1950  Date: 07/15/24    Discharge Summary: PT    Discharge Information: Date of discharge 7/15/2024    Therapy Summary: Patient was placed on 30 day hold from PT with continuation of HEP, was instructed that if she did not return to therapy within 30 days she would be discharged.

## 2025-01-29 ENCOUNTER — EVALUATION (OUTPATIENT)
Dept: PHYSICAL THERAPY | Facility: CLINIC | Age: 75
End: 2025-01-29
Payer: MEDICARE

## 2025-01-29 DIAGNOSIS — M25.512 CHRONIC LEFT SHOULDER PAIN: Primary | ICD-10-CM

## 2025-01-29 DIAGNOSIS — Z98.890 S/P LEFT ROTATOR CUFF REPAIR: ICD-10-CM

## 2025-01-29 DIAGNOSIS — G89.29 CHRONIC LEFT SHOULDER PAIN: Primary | ICD-10-CM

## 2025-01-29 PROCEDURE — 97161 PT EVAL LOW COMPLEX 20 MIN: CPT | Mod: GP | Performed by: PHYSICAL THERAPIST

## 2025-01-29 PROCEDURE — 97110 THERAPEUTIC EXERCISES: CPT | Mod: GP | Performed by: PHYSICAL THERAPIST

## 2025-01-29 ASSESSMENT — ENCOUNTER SYMPTOMS
OCCASIONAL FEELINGS OF UNSTEADINESS: 0
DEPRESSION: 0
LOSS OF SENSATION IN FEET: 0

## 2025-01-29 ASSESSMENT — PATIENT HEALTH QUESTIONNAIRE - PHQ9
1. LITTLE INTEREST OR PLEASURE IN DOING THINGS: NOT AT ALL
SUM OF ALL RESPONSES TO PHQ9 QUESTIONS 1 AND 2: 0
2. FEELING DOWN, DEPRESSED OR HOPELESS: NOT AT ALL

## 2025-01-29 ASSESSMENT — PAIN SCALES - GENERAL: PAINLEVEL_OUTOF10: 2

## 2025-01-29 ASSESSMENT — PAIN - FUNCTIONAL ASSESSMENT: PAIN_FUNCTIONAL_ASSESSMENT: 0-10

## 2025-01-29 NOTE — PROGRESS NOTES
Physical Therapy Evaluation and Treatment      Patient Name: Shona Hansen  MRN: [id number]  Today's Date: 1/29/2025  Time Calculation  Start Time: 1050  Stop Time: 1130  Time Calculation (min): 40 min    PT Evaluation Time Entry  PT Evaluation (Low) Time Entry: 29   PT Therapeutic Procedures Time Entry  Therapeutic Exercise Time Entry: 10                         Assessment:  PT Assessment Results: Decreased strength, Decreased range of motion, Decreased mobility, Pain, Orthopedic restrictions  Rehab Prognosis: Good  Barriers to Participation:  (None)    The pt presents with Medical Dx of  Chronic L Shoulder Pain, S/P L RTC Repair on 1/23/2025.  Pt presents with increased pain, decreased strength, ROM/flexibility and functional mobility.  Pt would benefit from PT services from PT services in order to improve on these deficits and to maximize ability to reach out with UE, to reach UE overhead, lift objects overhead and functional activity/mobility as her protocol allows.    This patient's care and PT of record will be transferred from Torey Leija PT, MPT to Scar Marroquin PT effective as of  1/29/2025.        Plan:  Treatment/Interventions: Cryotherapy, Education/ Instruction, Manual therapy, Therapeutic exercises, Therapeutic activities  PT Plan: Skilled PT  PT Frequency:  (2x/wk for 4 weeks or 9 sessions)  Rehab Potential: Good  Plan of Care Agreement: Patient (Patient Goal:  Improve strength, ROM and use of L shoulder)    Current Problem:   Problem List Items Addressed This Visit             ICD-10-CM    S/P left rotator cuff repair Z98.890    Relevant Orders    Follow Up In Physical Therapy    Chronic left shoulder pain - Primary M25.512, G89.29    Relevant Orders    Follow Up In Physical Therapy       Subjective   Pt had her L RTC Repaired on 1/23/2025.  The pt reports that her L shoulder pain has been pretty well controlled this last week since surgery.  Pt reports that sleeping is difficult and that she has been  trying to sleep in her recliner in the living room.   Pt reports mild ache occasionally at rest but overall pain is pretty minimal.  Pt is in a sling currently.      General  Reason for Referral: L RTC Repair  Referred By: Dr. Hill  General Comment: Visit # 1/9  Visit #26    Precautions:  Precautions  Precautions Comment: Lower Fall Risk.  PMH: HTN, R trigger finger.   S/P L RTC Repair on 1/23/2025.  See Protocol    Pain:  Pain Assessment  Pain Assessment: 0-10  0-10 (Numeric) Pain Score: 2  Pain Type: Surgical pain  Pain Location: Shoulder  Pain Orientation: Left    Home Living:   Pt lives with her  in a 1 story home.  No concerns on living set up.      Prior Level of Function:   Pt was I with all ADL's and IADL's prior.  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        Objective   Observation:   FHP with rounded shoulders.  Scope sites are covered by post surgical bandaging.  No observable drainage or sings of infection.      Palpation:  Tender L shoulder near scope sites.      Strength:                                                                     R-   Flex   5-/5   Abd   5-/5   ER   5-/5   IR   5-/5   Bicept   5-/5   Tricept   5-/5       L-   Flex   3-/5   Abd   3-/5   ER   3/5   IR   3/5   Bicept   3/5   Tricept   3/5  all tested grossly    PROM:                 R-   WFL Throughout      L-   Flex   deg     Abd   deg     ER   deg     IR   deg    Special Tests:     N/A         Outcome Measures:  Other Measures  Disability of Arm Shoulder Hand (DASH): 86.36     Treatments:  S/P L RTC Repair on  1/23/2025 .  See protocol.  Ther Ex:  10 min   1 unit  Shoulder PROM all planes   7 min  HEP Instruction on pendulums and shoulder table slides today   3 min        OP EDUCATION:   PT edu pt regarding PT POC/course of treatment and HEP.  Pt verbalized good understanding.      Goals:  Physical Therapy - Physical Therapy - January 2024 Problems       Physical Therapy - Physical Therapy -  2024 Problems (Resolved)       PT Problem            Physical Therapy - Physical Therapy - 2025 Problems       Physical Therapy - Physical Therapy - 2025 Problems (Active)       PT Problem       PT Goal 1       Start:  25    Expected End:  25       LTG's:  1) Improve L  shoulder strength to >= 5-/5 throughout as her protocol allows in order to facilitate ability to reach overhead and lift objects.      4-6 weeks      2) Improve  L shoulder PROM  to >= 160 deg flex/abd and 80 deg ER/IR as her protocol allows in order to better reach overhead or behind back.       4-6 weeks      3) Improve  L shoulder pain from   5/10 to <= 0-3/10 with activity in order to improve QOL.        4-6 weeks      4) Improve quick dash score by >= 5 points in order to improve QOL.    4-6 weeks      5) The pt will improve ability to raise arm/shoulder overhead, reach at various angles, lift/carry objects, dress upper body, overhead activity and return to exercise and work around the home and/or on the job as her protocol allows without significant limitation.    4-6 weeks        ST) Pt/caregiver will be I and consistent with HEP with use of handout as needed in order to maximize shoulder strength and ROM/flexibility.       2-3 weeks

## 2025-01-29 NOTE — LETTER
January 29, 2025    Alok Hill MD  [address] [facility] [place] [address]    Patient: Shona Hansen   YOB: 1950   Date of Visit: 1/29/2025       Dear Alok Hill MD  [address] [facility] [place] [address]    The attached plan of care is being sent to you because your patient’s medical reimbursement requires that you certify the plan of care. Your signature is required to allow uninterrupted insurance coverage.      You may indicate your approval by signing below and faxing this form back to us at Dept Fax: [phone].    Please call Dept: [phone] with any questions or concerns.    Thank you for this referral,        Shantanu Leija, PT  [address] [address]    Payer: Payor: MEDICARE / Plan: MEDICARE PART A AND B / Product Type: *No Product type* /                                                                         Date:     Dear Shantanu eLija, PT,     Re: Ms. Shona Hansen, MRN:[id number]    I certify that I have reviewed the attached plan of care and it is medically necessary for Ms. Shona Hansen (1950) who is under my care.          ______________________________________                    _________________  Provider name and credentials                                           Date and time                                                                                           Plan of Care 1/29/25   Effective from: 1/29/2025  Effective to: 3/3/2025    Plan ID: [id number]            Participants as of Finalize on 1/29/2025    Name Type Comments Contact Info    Alok Hill MD Consulting Physician  [phone]    Shantanu Leija, PT Physical Therapist  [phone]       Last Plan Note     Author: Shantanu Leija PT Status: Addendum Last edited: 1/29/2025 10:45 AM       Physical Therapy Evaluation and Treatment      Patient Name: Shona Hansen  MRN: [id number]  Today's Date:  1/29/2025  Time Calculation  Start Time: 1050  Stop Time: 1130  Time Calculation (min): 40 min    PT Evaluation Time Entry  PT Evaluation (Low) Time Entry: 29   PT Therapeutic Procedures Time Entry  Therapeutic Exercise Time Entry: 10                         Assessment:  PT Assessment Results: Decreased strength, Decreased range of motion, Decreased mobility, Pain, Orthopedic restrictions  Rehab Prognosis: Good  Barriers to Participation:  (None)    The pt presents with Medical Dx of  Chronic L Shoulder Pain, S/P L RTC Repair on 1/23/2025.  Pt presents with increased pain, decreased strength, ROM/flexibility and functional mobility.  Pt would benefit from PT services from PT services in order to improve on these deficits and to maximize ability to reach out with UE, to reach UE overhead, lift objects overhead and functional activity/mobility as her protocol allows.    This patient's care and PT of record will be transferred from Torey Leija PT, MPT to Scar Marroquin PT effective as of  1/29/2025.        Plan:  Treatment/Interventions: Cryotherapy, Education/ Instruction, Manual therapy, Therapeutic exercises, Therapeutic activities  PT Plan: Skilled PT  PT Frequency:  (2x/wk for 4 weeks or 9 sessions)  Rehab Potential: Good  Plan of Care Agreement: Patient (Patient Goal:  Improve strength, ROM and use of L shoulder)    Current Problem:   Problem List Items Addressed This Visit             ICD-10-CM    S/P left rotator cuff repair Z98.890    Relevant Orders    Follow Up In Physical Therapy    Chronic left shoulder pain - Primary M25.512, G89.29    Relevant Orders    Follow Up In Physical Therapy       Subjective   Pt had her L RTC Repaired on 1/23/2025.  The pt reports that her L shoulder pain has been pretty well controlled this last week since surgery.  Pt reports that sleeping is difficult and that she has been trying to sleep in her recliner in the living room.   Pt reports mild ache occasionally at rest but  overall pain is pretty minimal.  Pt is in a sling currently.      General  Reason for Referral: L RTC Repair  Referred By: Dr. Hill  General Comment: Visit # 1/9  Visit #26    Precautions:  Precautions  Precautions Comment: Lower Fall Risk.  PMH: HTN, R trigger finger.   S/P L RTC Repair on 1/23/2025.  See Protocol    Pain:  Pain Assessment  Pain Assessment: 0-10  0-10 (Numeric) Pain Score: 2  Pain Type: Surgical pain  Pain Location: Shoulder  Pain Orientation: Left    Home Living:   Pt lives with her  in a 1 story home.  No concerns on living set up.      Prior Level of Function:   Pt was I with all ADL's and IADL's prior.  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        Objective   Observation:   FHP with rounded shoulders.  Scope sites are covered by post surgical bandaging.  No observable drainage or sings of infection.      Palpation:  Tender L shoulder near scope sites.      Strength:                                                                     R-   Flex   5-/5   Abd   5-/5   ER   5-/5   IR   5-/5   Bicept   5-/5   Tricept   5-/5       L-   Flex   3-/5   Abd   3-/5   ER   3/5   IR   3/5   Bicept   3/5   Tricept   3/5  all tested grossly    PROM:                 R-   WFL Throughout      L-   Flex   deg     Abd   deg     ER   deg     IR   deg    Special Tests:     N/A         Outcome Measures:  Other Measures  Disability of Arm Shoulder Hand (DASH): 86.36     Treatments:  S/P L RTC Repair on  1/23/2025 .  See protocol.  Ther Ex:  10 min   1 unit  Shoulder PROM all planes   7 min  HEP Instruction on pendulums and shoulder table slides today   3 min        OP EDUCATION:   PT edu pt regarding PT POC/course of treatment and HEP.  Pt verbalized good understanding.      Goals:  Physical Therapy - Physical Therapy - January 2024 Problems       Physical Therapy - Physical Therapy - January 2024 Problems (Resolved)       PT Problem            Physical Therapy - Physical Therapy -  2025 Problems       Physical Therapy - Physical Therapy - 2025 Problems (Active)       PT Problem       PT Goal 1       Start:  25    Expected End:  25       LTG's:  1) Improve L  shoulder strength to >= 5-/5 throughout as her protocol allows in order to facilitate ability to reach overhead and lift objects.      4-6 weeks      2) Improve  L shoulder PROM  to >= 160 deg flex/abd and 80 deg ER/IR as her protocol allows in order to better reach overhead or behind back.       4-6 weeks      3) Improve  L shoulder pain from   5/10 to <= 0-3/10 with activity in order to improve QOL.        4-6 weeks      4) Improve quick dash score by >= 5 points in order to improve QOL.    4-6 weeks      5) The pt will improve ability to raise arm/shoulder overhead, reach at various angles, lift/carry objects, dress upper body, overhead activity and return to exercise and work around the home and/or on the job as her protocol allows without significant limitation.    4-6 weeks        ST) Pt/caregiver will be I and consistent with HEP with use of handout as needed in order to maximize shoulder strength and ROM/flexibility.       2-3 weeks                               Current Participants as of 2025    Name Type Comments Contact Info    Alok Hill MD Consulting Physician  [phone]    Signature pending    Shantanu Leija, PT Physical Therapist  [phone]

## 2025-01-31 ENCOUNTER — CLINICAL SUPPORT (OUTPATIENT)
Dept: PHYSICAL THERAPY | Facility: CLINIC | Age: 75
End: 2025-01-31
Payer: MEDICARE

## 2025-01-31 DIAGNOSIS — M25.512 CHRONIC LEFT SHOULDER PAIN: ICD-10-CM

## 2025-01-31 DIAGNOSIS — G89.29 CHRONIC LEFT SHOULDER PAIN: ICD-10-CM

## 2025-01-31 DIAGNOSIS — Z98.890 S/P LEFT ROTATOR CUFF REPAIR: ICD-10-CM

## 2025-01-31 PROCEDURE — 97140 MANUAL THERAPY 1/> REGIONS: CPT | Mod: GP,CQ | Performed by: PHYSICAL THERAPY ASSISTANT

## 2025-01-31 ASSESSMENT — PAIN - FUNCTIONAL ASSESSMENT: PAIN_FUNCTIONAL_ASSESSMENT: 0-10

## 2025-01-31 ASSESSMENT — PAIN SCALES - GENERAL: PAINLEVEL_OUTOF10: 3

## 2025-01-31 NOTE — PROGRESS NOTES
Physical Therapy    Physical Therapy Treatment    Patient Name: Shona Hansen  MRN: [id number]  Today's Date: 2025  Time Calculation  Start Time: 1045  Stop Time: 1130  Time Calculation (min): 45 min  PT Therapeutic Procedures Time Entry  Manual Therapy Time Entry: 39                   Current Problem  Problem List Items Addressed This Visit             ICD-10-CM       Musculoskeletal and Injuries    S/P left rotator cuff repair Z98.890    Chronic left shoulder pain M25.512, G89.29        General  Reason for Referral: L RTC Repair  Referred By: Dr. Hill  General Comment: Visit     Subjective   Patient  wearing arm brace/sling on left UE.  Patient reports  3/10  pain in left UE.  Patient reports sleeping in recliner at this time.  Patient spouse present for therapy.    Precautions  Precautions  Precautions Comment: Lower Fall Risk.  PMH: HTN, R trigger finger.   S/P L RTC Repair on 2025.  See Protocol    Pain  Pain Assessment: 0-10  0-10 (Numeric) Pain Score: 3  Pain Type: Surgical pain  Pain Location: Shoulder  Pain Orientation: Left    Objective   S/P L RTC Repair on  2025 .  See protocol.  Ther Ex:  39 min   3 unit  Shoulder PROM all planes     Shoulder slides across table  x10 reps  Assisted patient to don/doff shoulder brace sling.          Treatments:    OP Education      Assessment  Patient tolerated treatment with discomfort with PROM.  Patient needing rest break with therapy due to discomfort.  Continue with left shoulder PROM, per protocol.   Patient verified by name and .    Plan  Continue with left shoulder PROM per protocol.  Treatment/Interventions: Cryotherapy, Education/ Instruction, Manual therapy, Therapeutic exercises, Therapeutic activities  PT Plan: Skilled PT  PT Frequency:  (2x/wk for 4 weeks or 9 sessions)  Rehab Potential: Good  Plan of Care Agreement: Patient (Patient Goal:  Improve strength, ROM and use of L shoulder)    Active       PT Problem       PT Goal 1        Start:  25    Expected End:  25       LTG's:  1) Improve L  shoulder strength to >= 5-/5 throughout as her protocol allows in order to facilitate ability to reach overhead and lift objects.      4-6 weeks      2) Improve  L shoulder PROM  to >= 160 deg flex/abd and 80 deg ER/IR as her protocol allows in order to better reach overhead or behind back.       4-6 weeks      3) Improve  L shoulder pain from   5/10 to <= 0-3/10 with activity in order to improve QOL.        4-6 weeks      4) Improve quick dash score by >= 5 points in order to improve QOL.    4-6 weeks      5) The pt will improve ability to raise arm/shoulder overhead, reach at various angles, lift/carry objects, dress upper body, overhead activity and return to exercise and work around the home and/or on the job as her protocol allows without significant limitation.    4-6 weeks        ST) Pt/caregiver will be I and consistent with HEP with use of handout as needed in order to maximize shoulder strength and ROM/flexibility.       2-3 weeks

## 2025-02-05 ENCOUNTER — TREATMENT (OUTPATIENT)
Dept: PHYSICAL THERAPY | Facility: CLINIC | Age: 75
End: 2025-02-05
Payer: MEDICARE

## 2025-02-05 DIAGNOSIS — M25.512 CHRONIC LEFT SHOULDER PAIN: ICD-10-CM

## 2025-02-05 DIAGNOSIS — Z98.890 S/P LEFT ROTATOR CUFF REPAIR: ICD-10-CM

## 2025-02-05 DIAGNOSIS — G89.29 CHRONIC LEFT SHOULDER PAIN: ICD-10-CM

## 2025-02-05 PROCEDURE — 97140 MANUAL THERAPY 1/> REGIONS: CPT | Mod: GP,CQ

## 2025-02-05 PROCEDURE — 97110 THERAPEUTIC EXERCISES: CPT | Mod: GP,CQ

## 2025-02-05 ASSESSMENT — PAIN SCALES - GENERAL: PAINLEVEL_OUTOF10: 1

## 2025-02-05 ASSESSMENT — PAIN - FUNCTIONAL ASSESSMENT: PAIN_FUNCTIONAL_ASSESSMENT: 0-10

## 2025-02-05 NOTE — PROGRESS NOTES
Physical Therapy Treatment    Patient Name: Shona Hansen  MRN: [id number]  Today's Date: 2/5/2025  Time Calculation  Start Time: 1001  Stop Time: 1045  Time Calculation (min): 44 min  PT Therapeutic Procedures Time Entry  Manual Therapy Time Entry: 25  Therapeutic Exercise Time Entry: 15                 General:  General  Reason for Referral: L RTC Repair  Referred By: Dr. Hill  General Comment: Visit 3/9  Visit# 3  Assessment:   Minimal exacerbation of pain with PROM.   ER was most painful during PROM.  Added hip hikes today to work on hip strength and mobility.       Objective:   PROM: Completed all planes with protocol limits. Mild end range tightness with ER.    Needs assist with donning sling.    Plan:  Plan to progress PROM as able within protocol limits. JA     OP PT Plan  Treatment/Interventions: Cryotherapy, Education/ Instruction, Manual therapy, Therapeutic exercises, Therapeutic activities  PT Plan: Skilled PT  PT Frequency:  (2x/wk for 4 weeks or 9 sessions)  Rehab Potential: Good  Plan of Care Agreement: Patient (Patient Goal:  Improve strength, ROM and use of L shoulder)    Current Problem  Problem List Items Addressed This Visit             ICD-10-CM    S/P left rotator cuff repair Z98.890    Chronic left shoulder pain M25.512, G89.29       Subjective Patient identified by name and date of birth.   Patient is compliant with HEP completion with no issues to report at this time.  States that she has not needed to take RX pain meds in a long time. Notes she did not need to take ibuprofen since last PT visit.     Precautions  Precautions  Precautions Comment: Lower Fall Risk.  PMH: HTN, R trigger finger.   S/P L RTC Repair on 1/23/2025.  See Protocol    Pain  Pain Assessment: 0-10  0-10 (Numeric) Pain Score: 1  Pain Type: Surgical pain  Pain Location: Shoulder  Pain Orientation: Left    Treatments:  S/P L RTC Repair on  1/23/2025 .  See protocol.  Ther Ex:  15'  Shoulder scap retractions (N)   Shoulder  PROM all planes     Shoulder bicep 3 way 2 x 10 (N)   Shoulder slides across table  x 10 reps  Assisted patient to don/doff shoulder brace sling.    Manual: 25'  PROM all planes        OP EDUCATION:   PT edu pt regarding PT POC/course of treatment and HEP.  Pt verbalized good understanding.       Goals:  Active       PT Problem       PT Goal 1       Start:  25    Expected End:  25       LTG's:  1) Improve L  shoulder strength to >= 5-/5 throughout as her protocol allows in order to facilitate ability to reach overhead and lift objects.      4-6 weeks      2) Improve  L shoulder PROM  to >= 160 deg flex/abd and 80 deg ER/IR as her protocol allows in order to better reach overhead or behind back.       4-6 weeks      3) Improve  L shoulder pain from   5/10 to <= 0-3/10 with activity in order to improve QOL.        4-6 weeks      4) Improve quick dash score by >= 5 points in order to improve QOL.    4-6 weeks      5) The pt will improve ability to raise arm/shoulder overhead, reach at various angles, lift/carry objects, dress upper body, overhead activity and return to exercise and work around the home and/or on the job as her protocol allows without significant limitation.    4-6 weeks        ST) Pt/caregiver will be I and consistent with HEP with use of handout as needed in order to maximize shoulder strength and ROM/flexibility.       2-3 weeks

## 2025-02-07 ENCOUNTER — TREATMENT (OUTPATIENT)
Dept: PHYSICAL THERAPY | Facility: CLINIC | Age: 75
End: 2025-02-07
Payer: MEDICARE

## 2025-02-07 DIAGNOSIS — Z98.890 S/P LEFT ROTATOR CUFF REPAIR: ICD-10-CM

## 2025-02-07 DIAGNOSIS — M25.512 CHRONIC LEFT SHOULDER PAIN: ICD-10-CM

## 2025-02-07 DIAGNOSIS — G89.29 CHRONIC LEFT SHOULDER PAIN: ICD-10-CM

## 2025-02-07 PROCEDURE — 97140 MANUAL THERAPY 1/> REGIONS: CPT | Mod: GP,CQ

## 2025-02-07 PROCEDURE — 97110 THERAPEUTIC EXERCISES: CPT | Mod: GP,CQ

## 2025-02-07 ASSESSMENT — PAIN DESCRIPTION - DESCRIPTORS: DESCRIPTORS: ACHING

## 2025-02-07 ASSESSMENT — PAIN - FUNCTIONAL ASSESSMENT: PAIN_FUNCTIONAL_ASSESSMENT: 0-10

## 2025-02-07 ASSESSMENT — PAIN SCALES - GENERAL: PAINLEVEL_OUTOF10: 1

## 2025-02-07 NOTE — PROGRESS NOTES
"Physical Therapy Treatment    Patient Name: Shona Hansen  MRN: [id number]  Today's Date: 2/7/2025  Time Calculation  Start Time: 1046  Stop Time: 1135  Time Calculation (min): 49 min  PT Therapeutic Procedures Time Entry  Manual Therapy Time Entry: 25  Therapeutic Exercise Time Entry: 20                 General:  General  Reason for Referral: L RTC Repair  Referred By: Dr. Hill  General Comment: Visit 4/9  Visit# 4      Assessment:   Added new exercises to HEP with correct technique demonstrated and patient verbalizing understanding of instruction. (see below)  Able to complete new treatment activities per protocol.   Completed isometrics as sub max level with mild discomfort with ext.     Objective:  Cues needed to relax during PROM d/t guarding.   Passive flexion to 90 degrees flexion      Plan:  Plan to progress as able per protocol limits. JA     OP PT Plan  Treatment/Interventions: Cryotherapy, Education/ Instruction, Manual therapy, Therapeutic exercises, Therapeutic activities  PT Plan: Skilled PT  PT Frequency:  (2x/wk for 4 weeks or 9 sessions)  Rehab Potential: Good  Plan of Care Agreement: Patient (Patient Goal:  Improve strength, ROM and use of L shoulder)    Current Problem  Problem List Items Addressed This Visit             ICD-10-CM    S/P left rotator cuff repair Z98.890    Chronic left shoulder pain M25.512, G89.29       Subjective     Precautions       Pain  Pain Assessment: 0-10  0-10 (Numeric) Pain Score: 1  Pain Type: Surgical pain  Pain Location: Shoulder  Pain Orientation: Left  Pain Descriptors: Aching    Treatments:  S/P L RTC Repair on  1/23/2025 .  See protocol.  Ther Ex:  15'  Shoulder scap retractions   Seated ER with wand 2 x 10 (N)   Shoulder sub-max iso at the wall flex/ext/abd 10 x 5\" (N)   Shoulder bicep 3 way 2 x 10 (N)   Shoulder slides across table  x 10 reps  Assisted patient to don/doff shoulder brace sling.     Manual: 25'  PROM all planes        OP EDUCATION:    Access Code: " [email or web address]  URL: [email or web address]  Date: 2025  Prepared by: Rosa M Mcneal    Exercises  - Seated Shoulder External Rotation AAROM with Dowel  - 1 x daily - 7 x weekly - 1 sets - 10 reps  - Seated Scapular Retraction  - 1 x daily - 7 x weekly - 2 sets - 10 reps  - Isometric Shoulder Flexion at Wall  - 1 x daily - 7 x weekly - 2 sets - 10 reps - 5 seconds hold  - Isometric Shoulder Extension at Wall  - 1 x daily - 7 x weekly - 2 sets - 10 reps - 5 seconds hold  - Isometric Shoulder Abduction at Wall  - 1 x daily - 7 x weekly - 2 sets - 10 reps - 5 seconds hold  PT edu pt regarding PT POC/course of treatment and HEP.  Pt verbalized good understanding.       Goals:  Active       PT Problem       PT Goal 1       Start:  25    Expected End:  25       LTG's:  1) Improve L  shoulder strength to >= 5-/5 throughout as her protocol allows in order to facilitate ability to reach overhead and lift objects.      4-6 weeks      2) Improve  L shoulder PROM  to >= 160 deg flex/abd and 80 deg ER/IR as her protocol allows in order to better reach overhead or behind back.       4-6 weeks      3) Improve  L shoulder pain from   5/10 to <= 0-3/10 with activity in order to improve QOL.        4-6 weeks      4) Improve quick dash score by >= 5 points in order to improve QOL.    4-6 weeks      5) The pt will improve ability to raise arm/shoulder overhead, reach at various angles, lift/carry objects, dress upper body, overhead activity and return to exercise and work around the home and/or on the job as her protocol allows without significant limitation.    4-6 weeks        ST) Pt/caregiver will be I and consistent with HEP with use of handout as needed in order to maximize shoulder strength and ROM/flexibility.       2-3 weeks

## 2025-02-10 ENCOUNTER — TREATMENT (OUTPATIENT)
Dept: PHYSICAL THERAPY | Facility: CLINIC | Age: 75
End: 2025-02-10
Payer: MEDICARE

## 2025-02-10 DIAGNOSIS — Z98.890 S/P LEFT ROTATOR CUFF REPAIR: ICD-10-CM

## 2025-02-10 DIAGNOSIS — G89.29 CHRONIC LEFT SHOULDER PAIN: ICD-10-CM

## 2025-02-10 DIAGNOSIS — M25.512 CHRONIC LEFT SHOULDER PAIN: ICD-10-CM

## 2025-02-10 PROCEDURE — 97110 THERAPEUTIC EXERCISES: CPT | Mod: GP,CQ

## 2025-02-10 PROCEDURE — 97140 MANUAL THERAPY 1/> REGIONS: CPT | Mod: GP,CQ

## 2025-02-10 ASSESSMENT — PAIN - FUNCTIONAL ASSESSMENT: PAIN_FUNCTIONAL_ASSESSMENT: 0-10

## 2025-02-10 ASSESSMENT — PAIN DESCRIPTION - DESCRIPTORS: DESCRIPTORS: ACHING

## 2025-02-10 ASSESSMENT — PAIN SCALES - GENERAL: PAINLEVEL_OUTOF10: 1

## 2025-02-10 NOTE — PROGRESS NOTES
Physical Therapy Treatment    Patient Name: Shona Hansen  MRN: [id number]  Today's Date: 2/10/2025  Time Calculation  Start Time: 0917  Stop Time: 1000  Time Calculation (min): 43 min  PT Therapeutic Procedures Time Entry  Manual Therapy Time Entry: 23  Therapeutic Exercise Time Entry: 20                 General:  General  Reason for Referral: L RTC Repair  Referred By: Dr. Hill  General Comment: Visit 5/9  Visit# 5      Assessment:   Patient able to complete stretches with minimal increase in pain.   Worked on wrist extension passively today also per patient c/o tightness and discomfort from the sling.   Instructed patient on wrist stretches while wearing sling.     Objective:     AAROM: 110 degrees flex with table slides.   Mild end range tightness passively all planes.     Plan:  Plan to continue with PROM and stretching per protocol. JA     OP PT Plan  Treatment/Interventions: Cryotherapy, Education/ Instruction, Manual therapy, Therapeutic exercises, Therapeutic activities  PT Plan: Skilled PT  PT Frequency:  (2x/wk for 4 weeks or 9 sessions)  Rehab Potential: Good  Plan of Care Agreement: Patient (Patient Goal:  Improve strength, ROM and use of L shoulder)    Current Problem  Problem List Items Addressed This Visit             ICD-10-CM    S/P left rotator cuff repair Z98.890    Chronic left shoulder pain M25.512, G89.29       Subjective Patient identified by name and date of birth.   Patient is compliant with HEP completion with no issues to report at this time.  States that she was not sore after last visit and is doing well overall.     Precautions  Precautions  Precautions Comment: Lower Fall Risk.  PMH: HTN, R trigger finger.   S/P L RTC Repair on 1/23/2025.  See Protocol    Pain  Pain Assessment: 0-10  0-10 (Numeric) Pain Score: 1  Pain Type: Surgical pain  Pain Location: Shoulder  Pain Orientation: Left  Pain Descriptors: Aching    Treatments:  S/P L RTC Repair on  1/23/2025 .  See protocol.  Ther Ex:   "15'  Shoulder scap retractions   Shoulder rolls bwd/fwd x 10 (N)   Seated ER with wand 2 x 10   Shoulder sub-max iso at the wall flex/ext/abd 10 x 5\" (N)   Shoulder bicep 3 way 2 x 10    Shoulder slides across table  x 10 reps  Assisted patient to don/doff shoulder brace sling.     Manual: 23'  Shoulder PROM all planes 20'  Wrist Ext PROM 3'     OP EDUCATION:       Goals:  Active       PT Problem       PT Goal 1       Start:  25    Expected End:  25       LTG's:  1) Improve L  shoulder strength to >= 5-/5 throughout as her protocol allows in order to facilitate ability to reach overhead and lift objects.      4-6 weeks      2) Improve  L shoulder PROM  to >= 160 deg flex/abd and 80 deg ER/IR as her protocol allows in order to better reach overhead or behind back.       4-6 weeks      3) Improve  L shoulder pain from   5/10 to <= 0-3/10 with activity in order to improve QOL.        4-6 weeks      4) Improve quick dash score by >= 5 points in order to improve QOL.    4-6 weeks      5) The pt will improve ability to raise arm/shoulder overhead, reach at various angles, lift/carry objects, dress upper body, overhead activity and return to exercise and work around the home and/or on the job as her protocol allows without significant limitation.    4-6 weeks        ST) Pt/caregiver will be I and consistent with HEP with use of handout as needed in order to maximize shoulder strength and ROM/flexibility.       2-3 weeks              "

## 2025-02-14 ENCOUNTER — TREATMENT (OUTPATIENT)
Dept: PHYSICAL THERAPY | Facility: CLINIC | Age: 75
End: 2025-02-14
Payer: MEDICARE

## 2025-02-14 DIAGNOSIS — G89.29 CHRONIC LEFT SHOULDER PAIN: ICD-10-CM

## 2025-02-14 DIAGNOSIS — M25.512 CHRONIC LEFT SHOULDER PAIN: ICD-10-CM

## 2025-02-14 DIAGNOSIS — Z98.890 S/P LEFT ROTATOR CUFF REPAIR: ICD-10-CM

## 2025-02-14 PROCEDURE — 97110 THERAPEUTIC EXERCISES: CPT | Mod: GP,CQ

## 2025-02-14 PROCEDURE — 97140 MANUAL THERAPY 1/> REGIONS: CPT | Mod: GP,CQ

## 2025-02-14 ASSESSMENT — PAIN SCALES - GENERAL: PAINLEVEL_OUTOF10: 3

## 2025-02-14 ASSESSMENT — PAIN DESCRIPTION - DESCRIPTORS: DESCRIPTORS: ACHING

## 2025-02-14 ASSESSMENT — PAIN - FUNCTIONAL ASSESSMENT: PAIN_FUNCTIONAL_ASSESSMENT: 0-10

## 2025-02-14 NOTE — PROGRESS NOTES
"Physical Therapy Treatment    Patient Name: Shona Hansen  MRN: [id number]  Today's Date: 2/14/2025  Time Calculation  Start Time: 1000  Stop Time: 1040  Time Calculation (min): 40 min  PT Therapeutic Procedures Time Entry  Manual Therapy Time Entry: 23  Therapeutic Exercise Time Entry: 17                 General:  General  Reason for Referral: L RTC Repair  Referred By: Dr. Hill  General Comment: Visit 7/9  Visit# 6  Assessment:   Completed shoulder rolls and scap retractions with mild discomfort with bwd rolls.     Objective:   End range tightness passively.     Plan:  Plan to progress per protocol as able. JA    OP PT Plan  Treatment/Interventions: Cryotherapy, Education/ Instruction, Manual therapy, Therapeutic exercises, Therapeutic activities  PT Plan: Skilled PT  PT Frequency:  (2x/wk for 4 weeks or 9 sessions)  Rehab Potential: Good  Plan of Care Agreement: Patient (Patient Goal:  Improve strength, ROM and use of L shoulder)    Current Problem  Problem List Items Addressed This Visit             ICD-10-CM    S/P left rotator cuff repair Z98.890    Chronic left shoulder pain M25.512, G89.29       Subjective Patient identified by name and date of birth.   Patient is compliant with HEP completion with no issues to report at this time.  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       Precautions  Precautions  Precautions Comment: Lower Fall Risk.  PMH: HTN, R trigger finger.   S/P L RTC Repair on 1/23/2025.  See Protocol    Pain  Pain Assessment: 0-10  0-10 (Numeric) Pain Score: 3  Pain Location: Shoulder  Pain Orientation: Left  Pain Descriptors: Aching    Treatments:  S/P L RTC Repair on  1/23/2025 .  See protocol.  Ther Ex:  17'  Shoulder scap retractions x 20 (P reps)  Shoulder rolls bwd/fwd x 20 (P reps)  Seated ER with wand 2 x 10   Shoulder sub-max iso at the wall flex/ext/abd 10 x 5\"    Shoulder bicep 3 way 2 x 10    Shoulder slides across table  x 10 reps  Assisted " patient to don/doff shoulder brace sling.     Manual: 23'  Shoulder PROM all planes 20'  Wrist Ext PROM 3'     OP EDUCATION:   Access Code: [email or web address]  URL: [email or web address]  Date: 2025  Prepared by: Rosa M Mcneal     Exercises  - Seated Shoulder External Rotation AAROM with Dowel  - 1 x daily - 7 x weekly - 1 sets - 10 reps  - Seated Scapular Retraction  - 1 x daily - 7 x weekly - 2 sets - 10 reps  - Isometric Shoulder Flexion at Wall  - 1 x daily - 7 x weekly - 2 sets - 10 reps - 5 seconds hold  - Isometric Shoulder Extension at Wall  - 1 x daily - 7 x weekly - 2 sets - 10 reps - 5 seconds hold  - Isometric Shoulder Abduction at Wall  - 1 x daily - 7 x weekly - 2 sets - 10 reps - 5 seconds hold  PT edu pt regarding PT POC/course of treatment and HEP.  Pt verbalized good understanding.       Goals:  Active       PT Problem       PT Goal 1       Start:  25    Expected End:  25       LTG's:  1) Improve L  shoulder strength to >= 5-/5 throughout as her protocol allows in order to facilitate ability to reach overhead and lift objects.      4-6 weeks      2) Improve  L shoulder PROM  to >= 160 deg flex/abd and 80 deg ER/IR as her protocol allows in order to better reach overhead or behind back.       4-6 weeks      3) Improve  L shoulder pain from   5/10 to <= 0-3/10 with activity in order to improve QOL.        4-6 weeks      4) Improve quick dash score by >= 5 points in order to improve QOL.    4-6 weeks      5) The pt will improve ability to raise arm/shoulder overhead, reach at various angles, lift/carry objects, dress upper body, overhead activity and return to exercise and work around the home and/or on the job as her protocol allows without significant limitation.    4-6 weeks        ST) Pt/caregiver will be I and consistent with HEP with use of handout as needed in order to maximize shoulder strength and ROM/flexibility.       2-3 weeks

## 2025-02-17 ENCOUNTER — TREATMENT (OUTPATIENT)
Dept: PHYSICAL THERAPY | Facility: CLINIC | Age: 75
End: 2025-02-17
Payer: MEDICARE

## 2025-02-17 DIAGNOSIS — M25.512 CHRONIC LEFT SHOULDER PAIN: ICD-10-CM

## 2025-02-17 DIAGNOSIS — G89.29 CHRONIC LEFT SHOULDER PAIN: ICD-10-CM

## 2025-02-17 DIAGNOSIS — Z98.890 S/P LEFT ROTATOR CUFF REPAIR: ICD-10-CM

## 2025-02-17 PROCEDURE — 97140 MANUAL THERAPY 1/> REGIONS: CPT | Mod: GP,CQ

## 2025-02-17 PROCEDURE — 97110 THERAPEUTIC EXERCISES: CPT | Mod: GP,CQ

## 2025-02-17 ASSESSMENT — PAIN SCALES - GENERAL: PAINLEVEL_OUTOF10: 1

## 2025-02-17 ASSESSMENT — PAIN - FUNCTIONAL ASSESSMENT: PAIN_FUNCTIONAL_ASSESSMENT: 0-10

## 2025-02-17 NOTE — PROGRESS NOTES
Physical Therapy Treatment    Patient Name: Shona Hansen  MRN: [id number]  Today's Date: 2/17/2025  Time Calculation  Start Time: 0917  Stop Time: 1000  Time Calculation (min): 43 min  PT Therapeutic Procedures Time Entry  Manual Therapy Time Entry: 24  Therapeutic Exercise Time Entry: 17                 General:  General  Reason for Referral: L RTC Repair  Referred By: Dr. Hill  General Comment: Visit 7/9  Visit# 7    Assessment:    Patient was able to complete stretches today with decreased discomfort from last visit.   Decreased guarding observed when patient is sitting at rest on plinth without sling.     Objective:     ROM: Passive flexion 110 degrees in supine.      Plan:  Plan to progress next visit per protocol limits. JA     OP PT Plan  Treatment/Interventions: Cryotherapy, Education/ Instruction, Manual therapy, Therapeutic exercises, Therapeutic activities  PT Plan: Skilled PT  PT Frequency:  (2x/wk for 4 weeks or 9 sessions)  Rehab Potential: Good  Plan of Care Agreement: Patient (Patient Goal:  Improve strength, ROM and use of L shoulder)    Current Problem  Problem List Items Addressed This Visit             ICD-10-CM    S/P left rotator cuff repair Z98.890    Chronic left shoulder pain M25.512, G89.29       Subjective Patient identified by name and date of birth.   Patient is compliant with HEP completion with no issues to report at this time.  States that she is not as sore as last reported.  Notes that she has no pain at rest.      Precautions  Precautions  Precautions Comment: Lower Fall Risk.  PMH: HTN, R trigger finger.   S/P L RTC Repair on 1/23/2025.  See Protocol    Pain  Pain Assessment: 0-10  0-10 (Numeric) Pain Score: 1  Pain Type: Surgical pain  Pain Location: Shoulder    Treatments:     S/P L RTC Repair on  1/23/2025 .  See protocol.  Ther Ex:  17'  Shoulder scap retractions x 20   Shoulder rolls bwd/fwd x 20   Seated ER with wand 2 x 10   Shoulder sub-max iso at the wall flex/ext/abd 10  "x 5\"    Shoulder bicep 3 way 2 x 15  (P reps)   Shoulder slides across table 2 x 10 reps (P reps)  Assisted patient to don/doff shoulder brace sling.     Shoulder PROM all planes 20'  Wrist Ext PROM 3'     OP EDUCATION:   Access Code: [email or web address]  URL: [email or web address]  Date: 2025  Prepared by: Rosa M Mcneal     Exercises  - Seated Shoulder External Rotation AAROM with Dowel  - 1 x daily - 7 x weekly - 1 sets - 10 reps  - Seated Scapular Retraction  - 1 x daily - 7 x weekly - 2 sets - 10 reps  - Isometric Shoulder Flexion at Wall  - 1 x daily - 7 x weekly - 2 sets - 10 reps - 5 seconds hold  - Isometric Shoulder Extension at Wall  - 1 x daily - 7 x weekly - 2 sets - 10 reps - 5 seconds hold  - Isometric Shoulder Abduction at Wall  - 1 x daily - 7 x weekly - 2 sets - 10 reps - 5 seconds hold  PT edu pt regarding PT POC/course of treatment and HEP.  Pt verbalized good understanding.       Goals:  Active       PT Problem       PT Goal 1       Start:  25    Expected End:  25       LTG's:  1) Improve L  shoulder strength to >= 5-/5 throughout as her protocol allows in order to facilitate ability to reach overhead and lift objects.      4-6 weeks      2) Improve  L shoulder PROM  to >= 160 deg flex/abd and 80 deg ER/IR as her protocol allows in order to better reach overhead or behind back.       4-6 weeks      3) Improve  L shoulder pain from   5/10 to <= 0-3/10 with activity in order to improve QOL.        4-6 weeks      4) Improve quick dash score by >= 5 points in order to improve QOL.    4-6 weeks      5) The pt will improve ability to raise arm/shoulder overhead, reach at various angles, lift/carry objects, dress upper body, overhead activity and return to exercise and work around the home and/or on the job as her protocol allows without significant limitation.    4-6 weeks        ST) Pt/caregiver will be I and consistent with HEP with use of handout as needed in " order to maximize shoulder strength and ROM/flexibility.       2-3 weeks

## 2025-02-19 ENCOUNTER — TREATMENT (OUTPATIENT)
Dept: PHYSICAL THERAPY | Facility: CLINIC | Age: 75
End: 2025-02-19
Payer: MEDICARE

## 2025-02-19 DIAGNOSIS — G89.29 CHRONIC LEFT SHOULDER PAIN: ICD-10-CM

## 2025-02-19 DIAGNOSIS — M25.512 CHRONIC LEFT SHOULDER PAIN: ICD-10-CM

## 2025-02-19 DIAGNOSIS — Z98.890 S/P LEFT ROTATOR CUFF REPAIR: ICD-10-CM

## 2025-02-19 PROCEDURE — 97110 THERAPEUTIC EXERCISES: CPT | Mod: GP,CQ

## 2025-02-19 PROCEDURE — 97140 MANUAL THERAPY 1/> REGIONS: CPT | Mod: GP,CQ

## 2025-02-19 ASSESSMENT — PAIN SCALES - GENERAL: PAINLEVEL_OUTOF10: 1

## 2025-02-19 ASSESSMENT — PAIN - FUNCTIONAL ASSESSMENT: PAIN_FUNCTIONAL_ASSESSMENT: 0-10

## 2025-02-19 NOTE — PROGRESS NOTES
Physical Therapy Treatment    Patient Name: Shona Hansen  MRN: [id number]  Today's Date: 2/19/2025  Time Calculation  Start Time: 1002  Stop Time: 1045  Time Calculation (min): 43 min  PT Therapeutic Procedures Time Entry  Manual Therapy Time Entry: 24  Therapeutic Exercise Time Entry: 17                 General:  General  Reason for Referral: L RTC Repair  Referred By: Dr. Hill  General Comment: Visit 8/9  Visit# 8  Assessment:   Able to complete seated AAROM flexion in minimal range today with minimal exacerbation of pain.       Objective:   Mild end range tightness passively at end range.     Plan:  Plan to progress ROM as able for improved daily function. JA     OP PT Plan  Treatment/Interventions: Cryotherapy, Education/ Instruction, Manual therapy, Therapeutic exercises, Therapeutic activities  PT Plan: Skilled PT  PT Frequency:  (2x/wk for 4 weeks or 9 sessions)  Rehab Potential: Good  Plan of Care Agreement: Patient (Patient Goal:  Improve strength, ROM and use of L shoulder)    Current Problem  Problem List Items Addressed This Visit             ICD-10-CM    S/P left rotator cuff repair Z98.890    Chronic left shoulder pain M25.512, G89.29       Subjective Patient identified by name and date of birth.   Patient is compliant with HEP completion with no issues to report at this time.  States that she is not hurting as bad today. States that she has been trying to use her hand in the sling.     Precautions  Precautions  Precautions Comment: Lower Fall Risk.  PMH: HTN, R trigger finger.   S/P L RTC Repair on 1/23/2025.  See Protocol    Pain  Pain Assessment: 0-10  0-10 (Numeric) Pain Score: 1  Pain Type: Surgical pain  Pain Location: Shoulder    Treatments:  S/P L RTC Repair on  1/23/2025 .  See protocol.  Ther Ex:  25'  Shoulder scap retractions x 20   Shoulder rolls bwd/fwd x 20   Seated ER with wand 2 x 10   Seated flexion with wand to 45 degrees 2 x 10 (N)   Shoulder sub-max iso at the wall flex/ext/abd 10  "x 5\"    Shoulder bicep 3 way 2 x 15  (P reps)   Shoulder slides across table 2 x 10 reps (P reps)  Assisted patient to don/doff shoulder brace sling.      Manual: 15'   Shoulder PROM all planes 20'  Wrist Ext PROM 3'        OP EDUCATION:   Access Code: [email or web address]  URL: [email or web address]  Date: 2025  Prepared by: Rosa M Mcneal     Exercises  - Seated Shoulder External Rotation AAROM with Dowel  - 1 x daily - 7 x weekly - 1 sets - 10 reps  - Seated Scapular Retraction  - 1 x daily - 7 x weekly - 2 sets - 10 reps  - Isometric Shoulder Flexion at Wall  - 1 x daily - 7 x weekly - 2 sets - 10 reps - 5 seconds hold  - Isometric Shoulder Extension at Wall  - 1 x daily - 7 x weekly - 2 sets - 10 reps - 5 seconds hold  - Isometric Shoulder Abduction at Wall  - 1 x daily - 7 x weekly - 2 sets - 10 reps - 5 seconds hold  PT edu pt regarding PT POC/course of treatment and HEP.  Pt verbalized good understanding.       Goals:  Active       PT Problem       PT Goal 1       Start:  25    Expected End:  25       LTG's:  1) Improve L  shoulder strength to >= 5-/5 throughout as her protocol allows in order to facilitate ability to reach overhead and lift objects.      4-6 weeks      2) Improve  L shoulder PROM  to >= 160 deg flex/abd and 80 deg ER/IR as her protocol allows in order to better reach overhead or behind back.       4-6 weeks      3) Improve  L shoulder pain from   5/10 to <= 0-3/10 with activity in order to improve QOL.        4-6 weeks      4) Improve quick dash score by >= 5 points in order to improve QOL.    4-6 weeks      5) The pt will improve ability to raise arm/shoulder overhead, reach at various angles, lift/carry objects, dress upper body, overhead activity and return to exercise and work around the home and/or on the job as her protocol allows without significant limitation.    4-6 weeks        ST) Pt/caregiver will be I and consistent with HEP with use of " handout as needed in order to maximize shoulder strength and ROM/flexibility.       2-3 weeks

## 2025-02-21 ENCOUNTER — APPOINTMENT (OUTPATIENT)
Dept: PHYSICAL THERAPY | Facility: CLINIC | Age: 75
End: 2025-02-21
Payer: MEDICARE

## 2025-02-24 ENCOUNTER — TREATMENT (OUTPATIENT)
Dept: PHYSICAL THERAPY | Facility: CLINIC | Age: 75
End: 2025-02-24
Payer: MEDICARE

## 2025-02-24 DIAGNOSIS — G89.29 CHRONIC LEFT SHOULDER PAIN: ICD-10-CM

## 2025-02-24 DIAGNOSIS — Z98.890 S/P LEFT ROTATOR CUFF REPAIR: ICD-10-CM

## 2025-02-24 DIAGNOSIS — M25.512 CHRONIC LEFT SHOULDER PAIN: ICD-10-CM

## 2025-02-24 PROCEDURE — 97140 MANUAL THERAPY 1/> REGIONS: CPT | Mod: GP | Performed by: PHYSICAL THERAPIST

## 2025-02-24 PROCEDURE — 97110 THERAPEUTIC EXERCISES: CPT | Mod: GP | Performed by: PHYSICAL THERAPIST

## 2025-02-24 ASSESSMENT — PAIN - FUNCTIONAL ASSESSMENT: PAIN_FUNCTIONAL_ASSESSMENT: 0-10

## 2025-02-24 ASSESSMENT — PAIN SCALES - GENERAL: PAINLEVEL_OUTOF10: 0 - NO PAIN

## 2025-02-24 NOTE — PROGRESS NOTES
"Physical Therapy    Physical Therapy    Physical Therapy Treatment    Patient Name: Shona Hansen  MRN: [id number]  : 1950   * No referring provider recorded for this case *  Today's Date: 2025                 General  Reason for Referral: L RTC Repair  Referred By: Dr. Hill  General Comment: Visit   Visit #9     Current Problem  Problem List Items Addressed This Visit             ICD-10-CM    S/P left rotator cuff repair Z98.890    Chronic left shoulder pain M25.512, G89.29            Subjective   Pt. Reports 0/10 pain today  Pt.'s name and  were confirmed this date.    Pt. Reports compliance with HEP.    Precautions  Precautions  Precautions Comment: Lower Fall Risk.  PMH: HTN, R trigger finger.   S/P L RTC Repair on 2025.  See Protocol    Pain  Pain Assessment: 0-10  0-10 (Numeric) Pain Score: 0 - No pain  Pain Location: Shoulder  Pain Orientation: Left    Objective      PROM left shoulder flexion 152*, ER 40*  AAROM left shoulderflexion supine 146*    Pt's name and  were confirmed today.    Outcome Measures:   DASH 24    Treatments:    S/P L RTC Repair on  2025 .  See protocol.  Ther Ex:  25'  Shoulder scap retractions x 20   Shoulder rolls bwd/fwd x 20   Seated ER with wand 2 x 10   Seated flexion with wand to 45 degrees 2 x 10   Shoulder sub-max iso at the wall flex/ext/abd 10 x 5\"    Shoulder bicep 3 way 2 x 15  (P reps)   Shoulder slides across table 2 x 10 reps (P reps)      Supine cane flexion x10 (N)      Manual: 15'   Shoulder PROM all planes 15'         OP EDUCATION:    Access Code: [email or web address]  URL: [email or web address]  Date: 2025  Prepared by: Scar Marroquin    Exercises  - Seated Shoulder External Rotation AAROM with Dowel  - 3 x daily - 7 x weekly - 2 sets - 10 reps - 2 seconds hold   Access Code: [email or web address]  URL: [email or web address]  Date: 2025  Prepared by: Rosa M Mcneal     Exercises  - Seated Shoulder External " Rotation AAROM with Dowel  - 1 x daily - 7 x weekly - 1 sets - 10 reps  - Seated Scapular Retraction  - 1 x daily - 7 x weekly - 2 sets - 10 reps  - Isometric Shoulder Flexion at Wall  - 1 x daily - 7 x weekly - 2 sets - 10 reps - 5 seconds hold  - Isometric Shoulder Extension at Wall  - 1 x daily - 7 x weekly - 2 sets - 10 reps - 5 seconds hold  - Isometric Shoulder Abduction at Wall  - 1 x daily - 7 x weekly - 2 sets - 10 reps - 5 seconds hold  PT edu pt regarding PT POC/course of treatment and HEP.  Pt verbalized good understanding.    Assessment:   Shoulder motion improving well    Plan:  OP PT Plan  Treatment/Interventions: Cryotherapy, Education/ Instruction, Manual therapy, Therapeutic exercises, Therapeutic activities  PT Plan: Skilled PT  PT Frequency:  (2x/wk for 4 weeks or 9 sessions)  Rehab Potential: Good  Plan of Care Agreement: Patient (Patient Goal:  Improve strength, ROM and use of L shoulder)    Goals:  Active       PT Problem       PT Goal 1       Start:  25    Expected End:  25       LTG's:  1) Improve L  shoulder strength to >= 5-/5 throughout as her protocol allows in order to facilitate ability to reach overhead and lift objects.      4-6 weeks      2) Improve  L shoulder PROM  to >= 160 deg flex/abd and 80 deg ER/IR as her protocol allows in order to better reach overhead or behind back.       4-6 weeks      3) Improve  L shoulder pain from   5/10 to <= 0-3/10 with activity in order to improve QOL.        4-6 weeks      4) Improve quick dash score by >= 5 points in order to improve QOL.    4-6 weeks      5) The pt will improve ability to raise arm/shoulder overhead, reach at various angles, lift/carry objects, dress upper body, overhead activity and return to exercise and work around the home and/or on the job as her protocol allows without significant limitation.    4-6 weeks        ST) Pt/caregiver will be I and consistent with HEP with use of handout as needed in order  to maximize shoulder strength and ROM/flexibility.       2-3 weeks

## 2025-02-26 ENCOUNTER — TREATMENT (OUTPATIENT)
Dept: PHYSICAL THERAPY | Facility: CLINIC | Age: 75
End: 2025-02-26
Payer: MEDICARE

## 2025-02-26 DIAGNOSIS — M25.512 CHRONIC LEFT SHOULDER PAIN: ICD-10-CM

## 2025-02-26 DIAGNOSIS — Z98.890 S/P LEFT ROTATOR CUFF REPAIR: ICD-10-CM

## 2025-02-26 DIAGNOSIS — G89.29 CHRONIC LEFT SHOULDER PAIN: ICD-10-CM

## 2025-02-26 PROCEDURE — 97140 MANUAL THERAPY 1/> REGIONS: CPT | Mod: GP,CQ

## 2025-02-26 PROCEDURE — 97110 THERAPEUTIC EXERCISES: CPT | Mod: GP,CQ

## 2025-02-26 ASSESSMENT — PAIN SCALES - GENERAL: PAINLEVEL_OUTOF10: 1

## 2025-02-26 ASSESSMENT — PAIN - FUNCTIONAL ASSESSMENT: PAIN_FUNCTIONAL_ASSESSMENT: 0-10

## 2025-02-26 NOTE — PROGRESS NOTES
"Physical Therapy Treatment    Patient Name: Shona Hansen  MRN: [id number]  Today's Date: 2/26/2025  Time Calculation  Start Time: 1400  Stop Time: 1444  Time Calculation (min): 44 min  PT Therapeutic Procedures Time Entry  Manual Therapy Time Entry: 15  Therapeutic Exercise Time Entry: 28         Current Problem  Problem List Items Addressed This Visit             ICD-10-CM    S/P left rotator cuff repair Z98.890    Chronic left shoulder pain M25.512, G89.29       Subjective   Pt.'s name and birthday confirmed.  Pt. Is compliant with HEP.  Pt. Reports 1/10 sx.'s with shoulder.  States she's trying to do her exercises at home.  Pt. Sees MD on March 10th.      Reason for Referral: L RTC Repair  Referred By: Dr. Hill  General Comment: Visit  1/ 9/9      Precautions  Precautions  Precautions Comment: Lower Fall Risk.  PMH: HTN, R trigger finger.   S/P L RTC Repair on 1/23/2025.  See Protocol        Pain  Pain Assessment: 0-10  0-10 (Numeric) Pain Score: 1  Pain Location: Shoulder  Pain Orientation: Left    Objective:  PROM left shoulder flexion 152*, ER 40*  AAROM left shoulderflexion supine 146*    Treatments:  S/P L RTC Repair on  1/23/2025 .  See protocol.  Ther Ex:  25'  Shoulder scap retractions x 20   Shoulder rolls bwd/fwd x 20   Seated ER with wand 2 x 10   Seated flexion with wand to 45 degrees 2 x 10   Shoulder sub-max iso at the wall flex/ext/abd 10 x 5\"    Shoulder bicep 3 way 2 x 15   Shoulder slides across table 2 x 10 reps    Supine cane flexion x10         Manual: 15'   Shoulder PROM all planes 15'           OP EDUCATION:  Access Code: [email or web address]  URL: [email or web address]  Date: 02/07/2025  Prepared by: Rosa M Mcneal     Exercises  - Seated Shoulder External Rotation AAROM with Dowel  - 1 x daily - 7 x weekly - 1 sets - 10 reps  - Seated Scapular Retraction  - 1 x daily - 7 x weekly - 2 sets - 10 reps  - Isometric Shoulder Flexion at Wall  - 1 x daily - 7 x weekly - 2 sets - 10 " reps - 5 seconds hold  - Isometric Shoulder Extension at Wall  - 1 x daily - 7 x weekly - 2 sets - 10 reps - 5 seconds hold  - Isometric Shoulder Abduction at Wall  - 1 x daily - 7 x weekly - 2 sets - 10 reps - 5 seconds hold           Assessment:  Fair tolerance noted with stretches and isometrics.  Pt. States PROM feels good with the shoulder.  Cues needed with isometrics.  ROM is improving.         Plan:  Continue with POC per protocol.  MB       OP PT Plan  Treatment/Interventions: Cryotherapy, Education/ Instruction, Manual therapy, Therapeutic exercises, Therapeutic activities  PT Plan: Skilled PT  PT Frequency:  (2x/wk for 4 weeks or 9 sessions)  Rehab Potential: Good  Plan of Care Agreement: Patient (Patient Goal:  Improve strength, ROM and use of L shoulder)              Goals:  Active       PT Problem       PT Goal 1       Start:  25    Expected End:  25       LTG's:  1) Improve L  shoulder strength to >= 5-/5 throughout as her protocol allows in order to facilitate ability to reach overhead and lift objects.      4-6 weeks      2) Improve  L shoulder PROM  to >= 160 deg flex/abd and 80 deg ER/IR as her protocol allows in order to better reach overhead or behind back.       4-6 weeks      3) Improve  L shoulder pain from   5/10 to <= 0-3/10 with activity in order to improve QOL.        4-6 weeks      4) Improve quick dash score by >= 5 points in order to improve QOL.    4-6 weeks      5) The pt will improve ability to raise arm/shoulder overhead, reach at various angles, lift/carry objects, dress upper body, overhead activity and return to exercise and work around the home and/or on the job as her protocol allows without significant limitation.    4-6 weeks        ST) Pt/caregiver will be I and consistent with HEP with use of handout as needed in order to maximize shoulder strength and ROM/flexibility.       2-3 weeks

## 2025-03-05 ENCOUNTER — TREATMENT (OUTPATIENT)
Dept: PHYSICAL THERAPY | Facility: CLINIC | Age: 75
End: 2025-03-05
Payer: MEDICARE

## 2025-03-05 DIAGNOSIS — G89.29 CHRONIC LEFT SHOULDER PAIN: ICD-10-CM

## 2025-03-05 DIAGNOSIS — M25.512 CHRONIC LEFT SHOULDER PAIN: ICD-10-CM

## 2025-03-05 DIAGNOSIS — Z98.890 S/P LEFT ROTATOR CUFF REPAIR: ICD-10-CM

## 2025-03-05 PROCEDURE — 97140 MANUAL THERAPY 1/> REGIONS: CPT | Mod: GP,CQ

## 2025-03-05 PROCEDURE — 97110 THERAPEUTIC EXERCISES: CPT | Mod: GP,CQ

## 2025-03-05 ASSESSMENT — PAIN - FUNCTIONAL ASSESSMENT: PAIN_FUNCTIONAL_ASSESSMENT: 0-10

## 2025-03-05 ASSESSMENT — PAIN SCALES - GENERAL: PAINLEVEL_OUTOF10: 1

## 2025-03-05 NOTE — PROGRESS NOTES
Physical Therapy Treatment    Patient Name: Shona Hansen  MRN: [id number]  Today's Date: 3/5/2025  Time Calculation  Start Time: 0918  Stop Time: 1000  Time Calculation (min): 42 min  PT Therapeutic Procedures Time Entry  Manual Therapy Time Entry: 10  Therapeutic Exercise Time Entry: 31                 General:  General  Reason for Referral: L RTC Repair  Referred By: Dr. Hill  General Comment: Visit  2/ 9/9  Visit# 11    Assessment:   Able to progress AAROM in preparation for next phase of protocol that starts on 3/6/25.   Added new exercises to HEP with correct technique demonstrated and patient verbalizing understanding of instruction. (see below)      Objective:   AAROM with wand is supine 145 degrees     Plan:  Plan to progress per current protocol for improved AAROM as able. JA     OP PT Plan  Treatment/Interventions: Cryotherapy, Education/ Instruction, Manual therapy, Therapeutic exercises, Therapeutic activities  PT Plan: Skilled PT  PT Frequency:  (2x/wk for 4 weeks or 9 sessions)  Rehab Potential: Good  Plan of Care Agreement: Patient (Patient Goal:  Improve strength, ROM and use of L shoulder)    Current Problem  Problem List Items Addressed This Visit             ICD-10-CM    S/P left rotator cuff repair Z98.890    Chronic left shoulder pain M25.512, G89.29       Subjective Patient identified by name and date of birth.   Patient is compliant with HEP completion with no issues to report at this time.  States that she is more sore today.     Precautions  Precautions  Precautions Comment: Lower Fall Risk.  PMH: HTN, R trigger finger.   S/P L RTC Repair on 1/23/2025.  See Protocol    Pain  Pain Assessment: 0-10  0-10 (Numeric) Pain Score: 1  Pain Location: Sacrum  Pain Orientation: Left    Treatments:    S/P L RTC Repair on  1/23/2025 .  See protocol.  Ther Ex:  25'  Pulley (A)   UBE (A)   Ball on plinth flexion/extenion and med/lateral 2 x 10 ea(N)   Shoulder scap retractions x 20   Shoulder rolls  "bwd/fwd x 20   Seated ER with wand 2 x 10   Seated flexion with wand to 45 degrees 2 x 10   Shoulder sub-max iso at the wall flex/ext/abd 10 x 5\"    Shoulder bicep 3 way 2 x 15  (P reps)   Shoulder slides across table 2 x 10 reps (P to ball on plinth   Supine cane flexion x10   Supine protraction with wand 2 x 10 (N)   Supine chest press 2 x 10 (N)         Manual: 15'   Shoulder PROM all planes 15'    OP EDUCATION:   Access Code: [email or web address]  URL: [email or web address]  Date: 03/05/2025  Prepared by: Rosa M Mcneal    Exercises  - Seated Shoulder Flexion with Dowel to 90  - 1 x daily - 7 x weekly - 2 sets - 10 reps  - Supine Shoulder Flexion with Dowel  - 1 x daily - 7 x weekly - 2 sets - 10 reps  - Supine Shoulder Press AAROM in Abduction with Dowel  - 1 x daily - 7 x weekly - 2 sets - 10 reps  - Supine Shoulder Protraction with Dowel  - 1 x daily - 7 x weekly - 2 sets - 10 reps  Access Code: [email or web address]  URL: [email or web address]  Date: 02/24/2025  Prepared by: Scar Marroquin     Exercises  - Seated Shoulder External Rotation AAROM with Dowel  - 3 x daily - 7 x weekly - 2 sets - 10 reps - 2 seconds hold   Access Code: [email or web address]  URL: [email or web address]/  Access Code: [email or web address]  URL: [email or web address]/  Date: 02/07/2025  Prepared by: Rosa M Mcneal     Exercises  - Seated Shoulder External Rotation AAROM with Dowel  - 1 x daily - 7 x weekly - 1 sets - 10 reps  - Seated Scapular Retraction  - 1 x daily - 7 x weekly - 2 sets - 10 reps  - Isometric Shoulder Flexion at Wall  - 1 x daily - 7 x weekly - 2 sets - 10 reps - 5 seconds hold  - Isometric Shoulder Extension at Wall  - 1 x daily - 7 x weekly - 2 sets - 10 reps - 5 seconds hold  - Isometric Shoulder Abduction at Wall  - 1 x daily - 7 x weekly - 2 sets - 10 reps - 5 seconds hold        Goals:  Active       PT Problem       PT Goal 1       Start:  01/29/25    Expected End:  " 25       LTG's:  1) Improve L  shoulder strength to >= 5-/5 throughout as her protocol allows in order to facilitate ability to reach overhead and lift objects.      4-6 weeks      2) Improve  L shoulder PROM  to >= 160 deg flex/abd and 80 deg ER/IR as her protocol allows in order to better reach overhead or behind back.       4-6 weeks      3) Improve  L shoulder pain from   5/10 to <= 0-3/10 with activity in order to improve QOL.        4-6 weeks      4) Improve quick dash score by >= 5 points in order to improve QOL.    4-6 weeks      5) The pt will improve ability to raise arm/shoulder overhead, reach at various angles, lift/carry objects, dress upper body, overhead activity and return to exercise and work around the home and/or on the job as her protocol allows without significant limitation.    4-6 weeks        ST) Pt/caregiver will be I and consistent with HEP with use of handout as needed in order to maximize shoulder strength and ROM/flexibility.       2-3 weeks

## 2025-03-07 ENCOUNTER — TREATMENT (OUTPATIENT)
Dept: PHYSICAL THERAPY | Facility: CLINIC | Age: 75
End: 2025-03-07
Payer: MEDICARE

## 2025-03-07 DIAGNOSIS — G89.29 CHRONIC LEFT SHOULDER PAIN: ICD-10-CM

## 2025-03-07 DIAGNOSIS — Z98.890 S/P LEFT ROTATOR CUFF REPAIR: ICD-10-CM

## 2025-03-07 DIAGNOSIS — M25.512 CHRONIC LEFT SHOULDER PAIN: ICD-10-CM

## 2025-03-07 PROCEDURE — 97110 THERAPEUTIC EXERCISES: CPT | Mod: GP,CQ

## 2025-03-07 PROCEDURE — 97140 MANUAL THERAPY 1/> REGIONS: CPT | Mod: GP,CQ

## 2025-03-07 ASSESSMENT — PAIN SCALES - GENERAL: PAINLEVEL_OUTOF10: 1

## 2025-03-07 ASSESSMENT — PAIN - FUNCTIONAL ASSESSMENT: PAIN_FUNCTIONAL_ASSESSMENT: 0-10

## 2025-03-07 NOTE — PROGRESS NOTES
Physical Therapy Treatment    Patient Name: Shona Hansen  MRN: [id number]  Today's Date: 3/7/2025  Time Calculation  Start Time: 1045  Stop Time: 1130  Time Calculation (min): 45 min  PT Therapeutic Procedures Time Entry  Manual Therapy Time Entry: 10  Therapeutic Exercise Time Entry: 33                 General:  General  Reason for Referral: L RTC Repair  Referred By: Dr. Hill  General Comment: Visit  3/     9/9  Visit# 12    Assessment:   Progressed per protocol today.   Added pulley flex in min range to HEP with patient already having one at home.Declined handout.   Able to reach level 20 on the finger ladder today but then started to compensate.   Minimal exacerbation of pain levels with new treatment activities.     Objective:   Decreased guarding when not wearing sling in clinic.   Able to don sling independently.     Plan:  Plan to continue with progression of ROM and strength per protocol guidelines. JA     OP PT Plan  Treatment/Interventions: Cryotherapy, Education/ Instruction, Manual therapy, Therapeutic exercises, Therapeutic activities  PT Plan: Skilled PT  PT Frequency:  (2x/wk for 4 weeks or 9 sessions)  Rehab Potential: Good  Plan of Care Agreement: Patient (Patient Goal:  Improve strength, ROM and use of L shoulder)    Current Problem  Problem List Items Addressed This Visit             ICD-10-CM    S/P left rotator cuff repair Z98.890    Chronic left shoulder pain M25.512, G89.29       Subjective  Patient identified by name and date of birth.   Patient is compliant with HEP completion with no issues to report at this time.  States that she was in and out of the car a lot today but pain levels are still low.     Precautions  Precautions  Precautions Comment: Lower Fall Risk.  PMH: HTN, R trigger finger.   S/P L RTC Repair on 1/23/2025.  See Protocol    Pain  Pain Assessment: 0-10  0-10 (Numeric) Pain Score: 1  Pain Location: Shoulder  Pain Orientation: Left    Treatments:  S/P L RTC Repair on   "1/23/2025 .  See protocol.  Ther Ex:  25'  Pulley Flexion 3' (N)   UBE (A)  Finger ladder to #20 x 5 (N)    Ball on plinth flexion/extenion and med/lateral 2 x 10 ea   Shoulder scap retractions x 20   Shoulder rolls bwd/fwd x 20   Seated ER with wand 2 x 10   Seated flexion with wand to 45 degrees 2 x 10   Shoulder sub-max iso at the wall flex/ext/abd 10 x 5\"    Shoulder bicep 3 way 2 x 15    Shoulder AAROM with Red SB on plinth  -Flex/Ext  Supine cane flexion x10   Supine protraction with wand 2 x 10   Supine chest press 2 x 10         Manual: 15'   Shoulder PROM all planes 15'    OP EDUCATION:   Access Code: [email or web address]  URL: [email or web address]/  Date: 03/05/2025  Prepared by: Rosa M Mcneal     Exercises  - Seated Shoulder Flexion with Dowel to 90  - 1 x daily - 7 x weekly - 2 sets - 10 reps  - Supine Shoulder Flexion with Dowel  - 1 x daily - 7 x weekly - 2 sets - 10 reps  - Supine Shoulder Press AAROM in Abduction with Dowel  - 1 x daily - 7 x weekly - 2 sets - 10 reps  - Supine Shoulder Protraction with Dowel  - 1 x daily - 7 x weekly - 2 sets - 10 reps  Access Code: [email or web address]  URL: [email or web address]/  Date: 02/24/2025  Prepared by: Scar Marroquin     Exercises  - Seated Shoulder External Rotation AAROM with Dowel  - 3 x daily - 7 x weekly - 2 sets - 10 reps - 2 seconds hold   Access Code: [email or web address]  URL: [email or web address]/  Access Code: [email or web address]  URL: https://[employer]/  Date: 02/07/2025  Prepared by: Rosa M Mcneal     Exercises  - Seated Shoulder External Rotation AAROM with Dowel  - 1 x daily - 7 x weekly - 1 sets - 10 reps  - Seated Scapular Retraction  - 1 x daily - 7 x weekly - 2 sets - 10 reps  - Isometric Shoulder Flexion at Wall  - 1 x daily - 7 x weekly - 2 sets - 10 reps - 5 seconds hold  - Isometric Shoulder Extension at Wall  - 1 x daily - 7 x weekly - 2 sets - 10 reps - 5 seconds hold  - Isometric " Shoulder Abduction at Wall  - 1 x daily - 7 x weekly - 2 sets - 10 reps - 5 seconds hold     Goals:  Active       PT Problem       PT Goal 1       Start:  25    Expected End:  25       LTG's:  1) Improve L  shoulder strength to >= 5-/5 throughout as her protocol allows in order to facilitate ability to reach overhead and lift objects.      4-6 weeks      2) Improve  L shoulder PROM  to >= 160 deg flex/abd and 80 deg ER/IR as her protocol allows in order to better reach overhead or behind back.       4-6 weeks      3) Improve  L shoulder pain from   5/10 to <= 0-3/10 with activity in order to improve QOL.        4-6 weeks      4) Improve quick dash score by >= 5 points in order to improve QOL.    4-6 weeks      5) The pt will improve ability to raise arm/shoulder overhead, reach at various angles, lift/carry objects, dress upper body, overhead activity and return to exercise and work around the home and/or on the job as her protocol allows without significant limitation.    4-6 weeks        ST) Pt/caregiver will be I and consistent with HEP with use of handout as needed in order to maximize shoulder strength and ROM/flexibility.       2-3 weeks

## 2025-03-12 ENCOUNTER — APPOINTMENT (OUTPATIENT)
Dept: PHYSICAL THERAPY | Facility: CLINIC | Age: 75
End: 2025-03-12
Payer: MEDICARE

## 2025-03-19 ENCOUNTER — TREATMENT (OUTPATIENT)
Dept: PHYSICAL THERAPY | Facility: CLINIC | Age: 75
End: 2025-03-19
Payer: MEDICARE

## 2025-03-19 DIAGNOSIS — Z98.890 S/P LEFT ROTATOR CUFF REPAIR: ICD-10-CM

## 2025-03-19 DIAGNOSIS — G89.29 CHRONIC LEFT SHOULDER PAIN: ICD-10-CM

## 2025-03-19 DIAGNOSIS — M25.512 CHRONIC LEFT SHOULDER PAIN: ICD-10-CM

## 2025-03-19 PROCEDURE — 97110 THERAPEUTIC EXERCISES: CPT | Mod: GP,CQ

## 2025-03-19 PROCEDURE — 97140 MANUAL THERAPY 1/> REGIONS: CPT | Mod: GP,CQ

## 2025-03-19 ASSESSMENT — PAIN SCALES - GENERAL: PAINLEVEL_OUTOF10: 1

## 2025-03-19 ASSESSMENT — PAIN - FUNCTIONAL ASSESSMENT: PAIN_FUNCTIONAL_ASSESSMENT: 0-10

## 2025-03-19 NOTE — PROGRESS NOTES
Physical Therapy Treatment    Patient Name: Shona Hansen  MRN: [id number]  Today's Date: 3/19/2025  Time Calculation  Start Time: 0920  Stop Time: 1000  Time Calculation (min): 40 min  PT Therapeutic Procedures Time Entry  Manual Therapy Time Entry: 10  Therapeutic Exercise Time Entry: 30                 General:  General  Reason for Referral: L RTC Repair  Referred By: Dr. Hill  General Comment: Visit  4/ 9/9  Visit# 13  Assessment:   Patient able to don sling independently.   No exacerbation of pain with stretches and isometric strengthening.     Objective:   Passive flexion in supine 150 degrees.     Plan:  Plan to progress per current POC. JA     OP PT Plan  Treatment/Interventions: Cryotherapy, Education/ Instruction, Manual therapy, Therapeutic exercises, Therapeutic activities  PT Plan: Skilled PT  PT Frequency:  (2x/wk for 4 weeks or 9 sessions)  Rehab Potential: Good  Plan of Care Agreement: Patient (Patient Goal:  Improve strength, ROM and use of L shoulder)    Current Problem  Problem List Items Addressed This Visit             ICD-10-CM    S/P left rotator cuff repair Z98.890    Chronic left shoulder pain M25.512, G89.29       Subjective Patient identified by name and date of birth.   Patient is compliant with HEP completion with no issues to report at this time.  Has F/U on 5/5/25 with MD.   States that she is wearing the sling when at work and sitting.     Precautions  Precautions  Precautions Comment: Lower Fall Risk.  PMH: HTN, R trigger finger.   S/P L RTC Repair on 1/23/2025.  See Protocol    Pain  Pain Assessment: 0-10  0-10 (Numeric) Pain Score: 1  Pain Location: Shoulder  Pain Orientation: Left    Treatments:  S/P L RTC Repair on  1/23/2025 .  See protocol.  Ther Ex:  25'  Pulley Flexion 3'   UBE (A)  Finger ladder to #20 x 5   Ball on plinth flexion/extenion and med/lateral 2 x 10 ea   Shoulder scap retractions x 20   Shoulder rolls bwd/fwd x 20   Seated ER with wand 2 x 10   Seated flexion  "with wand to 45 degrees 2 x 10   Shoulder sub-max iso at the wall flex/ext/abd 10 x 5\"    Shoulder bicep 3 way 2 x 15    Shoulder AAROM with Red SB on plinth  -Flex/Ext  Supine cane flexion x10   Supine protraction with wand 2 x 10   Supine chest press 2 x 10         Manual: 15'   Shoulder PROM all planes 15'       OP EDUCATION:   Access Code: [email or web address]  URL: [email or web address]  Date: 03/05/2025  Prepared by: Rosa M Mcneal     Exercises  - Seated Shoulder Flexion with Dowel to 90  - 1 x daily - 7 x weekly - 2 sets - 10 reps  - Supine Shoulder Flexion with Dowel  - 1 x daily - 7 x weekly - 2 sets - 10 reps  - Supine Shoulder Press AAROM in Abduction with Dowel  - 1 x daily - 7 x weekly - 2 sets - 10 reps  - Supine Shoulder Protraction with Dowel  - 1 x daily - 7 x weekly - 2 sets - 10 reps  Access Code: [email or web address]  URL: [email or web address]  Date: 02/24/2025  Prepared by: Scar Marroquin     Exercises  - Seated Shoulder External Rotation AAROM with Dowel  - 3 x daily - 7 x weekly - 2 sets - 10 reps - 2 seconds hold   Access Code: [email or web address]  URL: [email or web address]  Access Code: [email or web address]  URL: [email or web address]://[email or web address]  Date: 02/07/2025  Prepared by: Rosa M Mcneal     Exercises  - Seated Shoulder External Rotation AAROM with Dowel  - 1 x daily - 7 x weekly - 1 sets - 10 reps  - Seated Scapular Retraction  - 1 x daily - 7 x weekly - 2 sets - 10 reps  - Isometric Shoulder Flexion at Wall  - 1 x daily - 7 x weekly - 2 sets - 10 reps - 5 seconds hold  - Isometric Shoulder Extension at Wall  - 1 x daily - 7 x weekly - 2 sets - 10 reps - 5 seconds hold  - Isometric Shoulder Abduction at Wall  - 1 x daily - 7 x weekly - 2 sets - 10 reps - 5 seconds hold     Goals:  Active       PT Problem       PT Goal 1       Start:  01/29/25    Expected End:  04/25/25       LTG's:  1) Improve L  shoulder strength to >= 5-/5 throughout as her protocol " allows in order to facilitate ability to reach overhead and lift objects.      4-6 weeks      2) Improve  L shoulder PROM  to >= 160 deg flex/abd and 80 deg ER/IR as her protocol allows in order to better reach overhead or behind back.       4-6 weeks      3) Improve  L shoulder pain from   5/10 to <= 0-3/10 with activity in order to improve QOL.        4-6 weeks      4) Improve quick dash score by >= 5 points in order to improve QOL.    4-6 weeks      5) The pt will improve ability to raise arm/shoulder overhead, reach at various angles, lift/carry objects, dress upper body, overhead activity and return to exercise and work around the home and/or on the job as her protocol allows without significant limitation.    4-6 weeks        ST) Pt/caregiver will be I and consistent with HEP with use of handout as needed in order to maximize shoulder strength and ROM/flexibility.       2-3 weeks

## 2025-03-21 ENCOUNTER — TREATMENT (OUTPATIENT)
Dept: PHYSICAL THERAPY | Facility: CLINIC | Age: 75
End: 2025-03-21
Payer: MEDICARE

## 2025-03-21 DIAGNOSIS — Z98.890 S/P LEFT ROTATOR CUFF REPAIR: ICD-10-CM

## 2025-03-21 DIAGNOSIS — M25.512 CHRONIC LEFT SHOULDER PAIN: ICD-10-CM

## 2025-03-21 DIAGNOSIS — G89.29 CHRONIC LEFT SHOULDER PAIN: ICD-10-CM

## 2025-03-21 PROCEDURE — 97110 THERAPEUTIC EXERCISES: CPT | Mod: GP,CQ

## 2025-03-21 PROCEDURE — 97140 MANUAL THERAPY 1/> REGIONS: CPT | Mod: GP,CQ

## 2025-03-21 ASSESSMENT — PAIN - FUNCTIONAL ASSESSMENT: PAIN_FUNCTIONAL_ASSESSMENT: 0-10

## 2025-03-21 ASSESSMENT — PAIN SCALES - GENERAL: PAINLEVEL_OUTOF10: 1

## 2025-03-21 NOTE — PROGRESS NOTES
Physical Therapy Treatment    Patient Name: Shona Hansen  MRN: [id number]  Today's Date: 3/21/2025  Time Calculation  Start Time: 0913  Stop Time: 0957  Time Calculation (min): 44 min  PT Therapeutic Procedures Time Entry  Manual Therapy Time Entry: 10  Therapeutic Exercise Time Entry: 32                 General:  General  Reason for Referral: L RTC Repair  Referred By: Dr. Hill  General Comment: Visit  5/ 9/9  Visit# 14      Assessment:   Discussed weaning off of sling and starting at home when able.   Reviewed HEP and pulley use at home to try to find a solution that may work for her since she has doors that are not the best.   Decreased overall pain upon completion of ADL's.     Objective:   PROM flexion 155 degrees.  Passive  degrees.     Plan:  Plan to progress strength and ROM per protocol limits. JA     OP PT Plan  Treatment/Interventions: Cryotherapy, Education/ Instruction, Manual therapy, Therapeutic exercises, Therapeutic activities  PT Plan: Skilled PT  PT Frequency:  (2x/wk for 4 weeks or 9 sessions)  Rehab Potential: Good  Plan of Care Agreement: Patient (Patient Goal:  Improve strength, ROM and use of L shoulder)    Current Problem  Problem List Items Addressed This Visit             ICD-10-CM    S/P left rotator cuff repair Z98.890    Chronic left shoulder pain M25.512, G89.29       Subjective Patient identified by name and date of birth.   Patient is compliant with HEP completion with no issues to report at this time.  States that she has been wearing sling all the time. Notes that she has discomfort when sitting without the sling.     Precautions  Precautions  Precautions Comment: Lower Fall Risk.  PMH: HTN, R trigger finger.   S/P L RTC Repair on 1/23/2025.  See Protocol    Pain  Pain Assessment: 0-10  0-10 (Numeric) Pain Score: 1  Pain Location: Shoulder  Pain Orientation: Left    Treatments:  Pulley Flexion 3'   UBE (A)  Finger ladder to #20 x 5   Shoulder scap retractions x 20  "  Shoulder rolls bwd/fwd x 20   Seated ER with wand 2 x 10   Seated flexion with wand to tolerance per protocol  2 x 10   Shoulder sub-max iso at the wall flex/ext/abd 10 x 5\"    Shoulder bicep 3 way 2 x 15    Shoulder AAROM with Red SB on plinth (P)  -Flex/Ext  - HADD/HABD  -CWW/CW  Supine cane flexion x10   Supine protraction with wand 2 x 10   Supine chest press 2 x 10         Manual: 15'   Shoulder PROM all planes 15'          OP EDUCATION:   Access Code: [email or web address]  URL: [email or web address]/  Date: 03/05/2025  Prepared by: Rosa M Mcneal     Exercises  - Seated Shoulder Flexion with Dowel to 90  - 1 x daily - 7 x weekly - 2 sets - 10 reps  - Supine Shoulder Flexion with Dowel  - 1 x daily - 7 x weekly - 2 sets - 10 reps  - Supine Shoulder Press AAROM in Abduction with Dowel  - 1 x daily - 7 x weekly - 2 sets - 10 reps  - Supine Shoulder Protraction with Dowel  - 1 x daily - 7 x weekly - 2 sets - 10 reps  Access Code: [email or web address]  URL: https://[employer]/  Date: 02/24/2025  Prepared by: Scar Marroquin     Exercises  - Seated Shoulder External Rotation AAROM with Dowel  - 3 x daily - 7 x weekly - 2 sets - 10 reps - 2 seconds hold   Access Code: [email or web address]  URL: https://[employer]/  Access Code: [email or web address]  URL: https://[employer]/  Date: 02/07/2025  Prepared by: Rosa M Mcneal     Exercises  - Seated Shoulder External Rotation AAROM with Dowel  - 1 x daily - 7 x weekly - 1 sets - 10 reps  - Seated Scapular Retraction  - 1 x daily - 7 x weekly - 2 sets - 10 reps  - Isometric Shoulder Flexion at Wall  - 1 x daily - 7 x weekly - 2 sets - 10 reps - 5 seconds hold  - Isometric Shoulder Extension at Wall  - 1 x daily - 7 x weekly - 2 sets - 10 reps - 5 seconds hold  - Isometric Shoulder Abduction at Wall  - 1 x daily - 7 x weekly - 2 sets - 10 reps - 5 seconds hold     Goals:  Active       PT Problem       PT Goal 1       Start:  " 25    Expected End:  25       LTG's:  1) Improve L  shoulder strength to >= 5-/5 throughout as her protocol allows in order to facilitate ability to reach overhead and lift objects.      4-6 weeks      2) Improve  L shoulder PROM  to >= 160 deg flex/abd and 80 deg ER/IR as her protocol allows in order to better reach overhead or behind back.       4-6 weeks      3) Improve  L shoulder pain from   5/10 to <= 0-3/10 with activity in order to improve QOL.        4-6 weeks      4) Improve quick dash score by >= 5 points in order to improve QOL.    4-6 weeks      5) The pt will improve ability to raise arm/shoulder overhead, reach at various angles, lift/carry objects, dress upper body, overhead activity and return to exercise and work around the home and/or on the job as her protocol allows without significant limitation.    4-6 weeks        ST) Pt/caregiver will be I and consistent with HEP with use of handout as needed in order to maximize shoulder strength and ROM/flexibility.       2-3 weeks

## 2025-03-24 ENCOUNTER — TREATMENT (OUTPATIENT)
Dept: PHYSICAL THERAPY | Facility: CLINIC | Age: 75
End: 2025-03-24
Payer: MEDICARE

## 2025-03-24 DIAGNOSIS — Z98.890 S/P LEFT ROTATOR CUFF REPAIR: ICD-10-CM

## 2025-03-24 DIAGNOSIS — G89.29 CHRONIC LEFT SHOULDER PAIN: ICD-10-CM

## 2025-03-24 DIAGNOSIS — M25.512 CHRONIC LEFT SHOULDER PAIN: ICD-10-CM

## 2025-03-24 PROCEDURE — 97140 MANUAL THERAPY 1/> REGIONS: CPT | Mod: GP,CQ

## 2025-03-24 PROCEDURE — 97110 THERAPEUTIC EXERCISES: CPT | Mod: GP,CQ

## 2025-03-24 ASSESSMENT — PAIN - FUNCTIONAL ASSESSMENT: PAIN_FUNCTIONAL_ASSESSMENT: 0-10

## 2025-03-24 ASSESSMENT — PAIN DESCRIPTION - DESCRIPTORS: DESCRIPTORS: ACHING

## 2025-03-24 NOTE — PROGRESS NOTES
Physical Therapy Treatment    Patient Name: Shona Hansen  MRN: [id number]  Today's Date: 3/24/2025  Time Calculation  Start Time: 0915  Stop Time: 1000  Time Calculation (min): 45 min  PT Therapeutic Procedures Time Entry  Manual Therapy Time Entry: 10  Therapeutic Exercise Time Entry: 33                 General:  General  Reason for Referral: L RTC Repair  Referred By: Dr. Hill  General Comment: Visit  6/ 9/9  Visit# 15    Assessment:   Added UBE today with only mild discomfort at initiation but then no discomfort.   Completed AROM to tolerance per protocol and post op status.     Objective:   PROM 160 degrees flexion              155 degrees ABD     Plan:  Plan to progress AROM and strength per protocol. JA     OP PT Plan  Treatment/Interventions: Cryotherapy, Education/ Instruction, Manual therapy, Therapeutic exercises, Therapeutic activities  PT Plan: Skilled PT  PT Frequency:  (2x/wk for 4 weeks or 9 sessions)  Rehab Potential: Good  Plan of Care Agreement: Patient (Patient Goal:  Improve strength, ROM and use of L shoulder)    Current Problem  Problem List Items Addressed This Visit             ICD-10-CM    S/P left rotator cuff repair Z98.890    Chronic left shoulder pain M25.512, G89.29       Subjective Patient identified by name and date of birth.   Patient is compliant with HEP completion with no issues to report at this time.  States that she is doing ok overall. Notes there is no significant pain to report.         Precautions  Precautions  Precautions Comment: Lower Fall Risk.  PMH: HTN, R trigger finger.   S/P L RTC Repair on 1/23/2025.  See Protocol    Pain  Pain Assessment: 0-10  0-10 (Numeric) Pain Score:  (1.5)  Pain Location: Shoulder  Pain Orientation: Left  Pain Descriptors: Aching    Treatments: 8 weeks   Pulley Flexion 3'   UBE  fwd 3' (N)  Finger ladder to #20 x 5   Resisted row gentle (A)   Shoulder scap retractions x 20   Shoulder rolls bwd/fwd x 20   Seated ER with wand 2 x 10  "  Seated flexion with wand to tolerance per protocol  2 x 10   Shoulder sub-max iso at the wall flex/ext/abd 10 x 5\"    Shoulder bicep 3 way 2 x 15  (A wt)   Shoulder AAROM with Red SB on plinth (P)  -Flex/Ext  - HADD/HABD  -CWW/CW  Supine cane flexion x10   Supine protraction with wand 2 x 10   Supine chest press 2 x 10           Manual: 15'   Shoulder PROM all planes 15'    OP EDUCATION:   Access Code: [email or web address]  URL: [email or web address]/  Date: 03/05/2025  Prepared by: Rosa M Mcneal     Exercises  - Seated Shoulder Flexion with Dowel to 90  - 1 x daily - 7 x weekly - 2 sets - 10 reps  - Supine Shoulder Flexion with Dowel  - 1 x daily - 7 x weekly - 2 sets - 10 reps  - Supine Shoulder Press AAROM in Abduction with Dowel  - 1 x daily - 7 x weekly - 2 sets - 10 reps  - Supine Shoulder Protraction with Dowel  - 1 x daily - 7 x weekly - 2 sets - 10 reps  Access Code: [email or web address]  URL: [email or web address]/  Date: 02/24/2025  Prepared by: Scar Marroquin     Exercises  - Seated Shoulder External Rotation AAROM with Dowel  - 3 x daily - 7 x weekly - 2 sets - 10 reps - 2 seconds hold   Access Code: [email or web address]  URL: https://[employer]/  Access Code: [email or web address]  URL: https://[employer]/  Date: 02/07/2025  Prepared by: Rosa M Mcneal     Exercises  - Seated Shoulder External Rotation AAROM with Dowel  - 1 x daily - 7 x weekly - 1 sets - 10 reps  - Seated Scapular Retraction  - 1 x daily - 7 x weekly - 2 sets - 10 reps  - Isometric Shoulder Flexion at Wall  - 1 x daily - 7 x weekly - 2 sets - 10 reps - 5 seconds hold  - Isometric Shoulder Extension at Wall  - 1 x daily - 7 x weekly - 2 sets - 10 reps - 5 seconds hold  - Isometric Shoulder Abduction at Wall  - 1 x daily - 7 x weekly - 2 sets - 10 reps - 5 seconds hold        Goals:  Active       PT Problem       PT Goal 1       Start:  01/29/25    Expected End:  04/25/25       LTG's:  1) " Improve L  shoulder strength to >= 5-/5 throughout as her protocol allows in order to facilitate ability to reach overhead and lift objects.      4-6 weeks      2) Improve  L shoulder PROM  to >= 160 deg flex/abd and 80 deg ER/IR as her protocol allows in order to better reach overhead or behind back.       4-6 weeks      3) Improve  L shoulder pain from   5/10 to <= 0-3/10 with activity in order to improve QOL.        4-6 weeks      4) Improve quick dash score by >= 5 points in order to improve QOL.    4-6 weeks      5) The pt will improve ability to raise arm/shoulder overhead, reach at various angles, lift/carry objects, dress upper body, overhead activity and return to exercise and work around the home and/or on the job as her protocol allows without significant limitation.    4-6 weeks        ST) Pt/caregiver will be I and consistent with HEP with use of handout as needed in order to maximize shoulder strength and ROM/flexibility.       2-3 weeks

## 2025-03-26 ENCOUNTER — TREATMENT (OUTPATIENT)
Dept: PHYSICAL THERAPY | Facility: CLINIC | Age: 75
End: 2025-03-26
Payer: MEDICARE

## 2025-03-26 DIAGNOSIS — Z98.890 S/P LEFT ROTATOR CUFF REPAIR: ICD-10-CM

## 2025-03-26 DIAGNOSIS — G89.29 CHRONIC LEFT SHOULDER PAIN: ICD-10-CM

## 2025-03-26 DIAGNOSIS — M25.512 CHRONIC LEFT SHOULDER PAIN: ICD-10-CM

## 2025-03-26 PROCEDURE — 97110 THERAPEUTIC EXERCISES: CPT | Mod: GP,CQ

## 2025-03-26 ASSESSMENT — PAIN - FUNCTIONAL ASSESSMENT: PAIN_FUNCTIONAL_ASSESSMENT: 0-10

## 2025-03-26 ASSESSMENT — PAIN DESCRIPTION - DESCRIPTORS: DESCRIPTORS: ACHING

## 2025-03-26 ASSESSMENT — PAIN SCALES - GENERAL: PAINLEVEL_OUTOF10: 1

## 2025-03-26 NOTE — PROGRESS NOTES
Physical Therapy Treatment    Patient Name: Shona Hansen  MRN: [id number]  Today's Date: 3/26/2025  Time Calculation  Start Time: 0915  Stop Time: 0958  Time Calculation (min): 43 min  PT Therapeutic Procedures Time Entry  Manual Therapy Time Entry: 10  Therapeutic Exercise Time Entry: 42                 General:  General  Reason for Referral: L RTC Repair  Referred By: Dr. Hill  General Comment: Visit  7/ 9/9  Visit# 16      Assessment:   Light resisted U/L row added today per current protocol and light weight added to bicep curls.   Patient got phone call during session from MD office for her clarification on what her restrictions are at home. Patient was told verbally that she can do what she is able to at home per tolerance but not lift more than a pound. PT can continue to follow the protocol.       Objective:   Tightness at end range with passive stretches all planes.       Plan:  Plan to continue with progression per current 8-10 week time     OP PT Plan  Treatment/Interventions: Cryotherapy, Education/ Instruction, Manual therapy, Therapeutic exercises, Therapeutic activities  PT Plan: Skilled PT  PT Frequency:  (2x/wk for 4 weeks or 9 sessions)  Rehab Potential: Good  Plan of Care Agreement: Patient (Patient Goal:  Improve strength, ROM and use of L shoulder)    Current Problem  Problem List Items Addressed This Visit             ICD-10-CM    S/P left rotator cuff repair Z98.890    Chronic left shoulder pain M25.512, G89.29       Subjective Patient identified by name and date of birth.   Patient is compliant with HEP completion with no issues to report at this time.  Notes that she did call MD to more clarification on limitations.       Precautions  Precautions  Precautions Comment: Lower Fall Risk.  PMH: HTN, R trigger finger.   S/P L RTC Repair on 1/23/2025.  See Protocol    Pain  Pain Assessment: 0-10  0-10 (Numeric) Pain Score: 1  Pain Location: Shoulder  Pain Orientation: Left  Pain Descriptors:  "Aching    Treatments:  8 weeks   Pulley Flexion 3'   UBE  fwd 3'   Finger ladder to #20 x 5   Resisted row gentle Lime band (N)   Shoulder scap retractions x 20   Shoulder rolls bwd/fwd x 20   Seated ER with wand 2 x 10   Seated flexion with wand to tolerance per protocol  2 x 10   Shoulder sub-max iso at the wall flex/ext/abd 10 x 5\"    Shoulder bicep 3 way 2 x 15  (2# wt) (P)   Shoulder AAROM with Large green  SB on plinth (P)  -Flex/Ext  - HADD/HABD  -CWW/CW  Supine cane flexion x10   Supine protraction with wand 2 x 10   Supine chest press 2 x 10            Manual: 15'   Shoulder PROM all planes 15'    OP EDUCATION:   Access Code: [email or web address]  URL: [email or web address]://[email or web address]/  Date: 03/05/2025  Prepared by: Rosa M Mcneal     Exercises  - Seated Shoulder Flexion with Dowel to 90  - 1 x daily - 7 x weekly - 2 sets - 10 reps  - Supine Shoulder Flexion with Dowel  - 1 x daily - 7 x weekly - 2 sets - 10 reps  - Supine Shoulder Press AAROM in Abduction with Dowel  - 1 x daily - 7 x weekly - 2 sets - 10 reps  - Supine Shoulder Protraction with Dowel  - 1 x daily - 7 x weekly - 2 sets - 10 reps  Access Code: [email or web address]  URL: https://[employer]/  Date: 02/24/2025  Prepared by: Scar Marroquin     Exercises  - Seated Shoulder External Rotation AAROM with Dowel  - 3 x daily - 7 x weekly - 2 sets - 10 reps - 2 seconds hold   Access Code: [email or web address]  URL: https://[employer]/  Access Code: [email or web address]  URL: https://[employer]/  Date: 02/07/2025  Prepared by: Rosa M Mcneal     Exercises  - Seated Shoulder External Rotation AAROM with Dowel  - 1 x daily - 7 x weekly - 1 sets - 10 reps  - Seated Scapular Retraction  - 1 x daily - 7 x weekly - 2 sets - 10 reps  - Isometric Shoulder Flexion at Wall  - 1 x daily - 7 x weekly - 2 sets - 10 reps - 5 seconds hold  - Isometric Shoulder Extension at Wall  - 1 x daily - 7 x weekly - 2 sets - 10 reps - 5 " seconds hold  - Isometric Shoulder Abduction at Wall  - 1 x daily - 7 x weekly - 2 sets - 10 reps - 5 seconds hold     Goals:  Active       PT Problem       PT Goal 1       Start:  25    Expected End:  25       LTG's:  1) Improve L  shoulder strength to >= 5-/5 throughout as her protocol allows in order to facilitate ability to reach overhead and lift objects.      4-6 weeks      2) Improve  L shoulder PROM  to >= 160 deg flex/abd and 80 deg ER/IR as her protocol allows in order to better reach overhead or behind back.       4-6 weeks      3) Improve  L shoulder pain from   5/10 to <= 0-3/10 with activity in order to improve QOL.        4-6 weeks      4) Improve quick dash score by >= 5 points in order to improve QOL.    4-6 weeks      5) The pt will improve ability to raise arm/shoulder overhead, reach at various angles, lift/carry objects, dress upper body, overhead activity and return to exercise and work around the home and/or on the job as her protocol allows without significant limitation.    4-6 weeks        ST) Pt/caregiver will be I and consistent with HEP with use of handout as needed in order to maximize shoulder strength and ROM/flexibility.       2-3 weeks

## 2025-03-31 ENCOUNTER — TREATMENT (OUTPATIENT)
Dept: PHYSICAL THERAPY | Facility: CLINIC | Age: 75
End: 2025-03-31
Payer: MEDICARE

## 2025-03-31 DIAGNOSIS — M25.512 CHRONIC LEFT SHOULDER PAIN: ICD-10-CM

## 2025-03-31 DIAGNOSIS — Z98.890 S/P LEFT ROTATOR CUFF REPAIR: ICD-10-CM

## 2025-03-31 DIAGNOSIS — G89.29 CHRONIC LEFT SHOULDER PAIN: ICD-10-CM

## 2025-03-31 PROCEDURE — 97140 MANUAL THERAPY 1/> REGIONS: CPT | Mod: GP | Performed by: PHYSICAL THERAPIST

## 2025-03-31 PROCEDURE — 97110 THERAPEUTIC EXERCISES: CPT | Mod: GP | Performed by: PHYSICAL THERAPIST

## 2025-03-31 ASSESSMENT — PAIN - FUNCTIONAL ASSESSMENT: PAIN_FUNCTIONAL_ASSESSMENT: 0-10

## 2025-03-31 ASSESSMENT — PAIN SCALES - GENERAL: PAINLEVEL_OUTOF10: 1

## 2025-03-31 NOTE — PROGRESS NOTES
"Physical Therapy    Physical Therapy Treatment    Patient Name: Shona Hansen  MRN: [id number]  : 1950   * No referring provider recorded for this case *  Today's Date: 3/31/2025                 General  Reason for Referral: L RTC Repair  Referred By: Dr. Hill  General Comment: Visit    Visit #17     Current Problem  Problem List Items Addressed This Visit    None           Subjective   Pt. Reports 1/10 pain today.     Pt.'s name and  were confirmed this date.    Pt. Reports compliance with HEP.    Precautions  Precautions  Precautions Comment: Lower Fall Risk.  PMH: HTN, R trigger finger.   S/P L RTC Repair on 2025.  See Protocol    Pain  Pain Assessment: 0-10  0-10 (Numeric) Pain Score: 1  Pain Location: Shoulder  Pain Orientation: Left    Objective      AROM flexion [id number]* AAROM flexion 147*, PROM 155*    Left shoulder flexion 4/5, abd 4-/5, ER 4-/5, IR 3+/5          Pt's name and  were confirmed today.    Outcome Measures:  Other Measures  Disability of Arm Shoulder Hand (DASH): 37    Treatments:    9 weeks   Pulley Flexion 3'   UBE  fwd 3'   Finger ladder to #20 x 5   Resisted row gentle Lime band (N)   Shoulder scap retractions x 20   Shoulder rolls bwd/fwd x 20   Seated ER with wand 2 x 10   Seated flexion with wand to tolerance per protocol  2 x 10   Shoulder sub-max iso at the wall flex/ext/abd 10 x 5\"    Shoulder bicep 3 way 2 x 15  (2# wt) (P)   Shoulder AAROM with Large green  SB on plinth (P)  -Flex/Ext  - HADD/HABD  -CWW/CW  Supine cane flexion x10   Supine protraction with wand 2 x 10   Supine chest press 2 x 10     Wall push up with a plus x10  Shoulder flexion to 90* with 1 pound   Bent over row 1 pound x10  Ball roll up wall x10          Manual: 15'   Shoulder PROM all planes 15'    OP EDUCATION:   Access Code: [email or web address]  URL: [email or web address]  Date: 2025  Prepared by: Rosa M Mcneal     Exercises  - Seated Shoulder Flexion with Dowel " to 90  - 1 x daily - 7 x weekly - 2 sets - 10 reps  - Supine Shoulder Flexion with Dowel  - 1 x daily - 7 x weekly - 2 sets - 10 reps  - Supine Shoulder Press AAROM in Abduction with Dowel  - 1 x daily - 7 x weekly - 2 sets - 10 reps  - Supine Shoulder Protraction with Dowel  - 1 x daily - 7 x weekly - 2 sets - 10 reps  Access Code: [email or web address]  URL: [email or web address]  Date: 02/24/2025  Prepared by: Scar Marroquin     Exercises  - Seated Shoulder External Rotation AAROM with Dowel  - 3 x daily - 7 x weekly - 2 sets - 10 reps - 2 seconds hold   Access Code: [email or web address]  URL: [email or web address]  Access Code: [email or web address]  URL: [email or web address]  Date: 02/07/2025  Prepared by: Rosa M Mcneal     Exercises  - Seated Shoulder External Rotation AAROM with Dowel  - 1 x daily - 7 x weekly - 1 sets - 10 reps  - Seated Scapular Retraction  - 1 x daily - 7 x weekly - 2 sets - 10 reps  - Isometric Shoulder Flexion at Wall  - 1 x daily - 7 x weekly - 2 sets - 10 reps - 5 seconds hold  - Isometric Shoulder Extension at Wall  - 1 x daily - 7 x weekly - 2 sets - 10 reps - 5 seconds hold  - Isometric Shoulder Abduction at Wall  - 1 x daily - 7 x weekly - 2 sets - 10 reps - 5 seconds hold     Assessment:   Pt shows shoulder hiking at 90* shoulder flexion against gravity    Plan:  OP PT Plan  Treatment/Interventions: Cryotherapy, Education/ Instruction, Manual therapy, Therapeutic exercises, Therapeutic activities  PT Plan: Skilled PT  PT Frequency:  (2x/wk for 4 weeks or 9 sessions)  Rehab Potential: Good  Plan of Care Agreement: Patient (Patient Goal:  Improve strength, ROM and use of L shoulder)    Goals:  Active       PT Problem       PT Goal 1       Start:  01/29/25    Expected End:  04/25/25       LTG's:  1) Improve L  shoulder strength to >= 5-/5 throughout as her protocol allows in order to facilitate ability to reach overhead and lift objects.      4-6  weeks      2) Improve  L shoulder PROM  to >= 160 deg flex/abd and 80 deg ER/IR as her protocol allows in order to better reach overhead or behind back.       4-6 weeks      3) Improve  L shoulder pain from   5/10 to <= 0-3/10 with activity in order to improve QOL.        4-6 weeks      4) Improve quick dash score by >= 5 points in order to improve QOL.    4-6 weeks      5) The pt will improve ability to raise arm/shoulder overhead, reach at various angles, lift/carry objects, dress upper body, overhead activity and return to exercise and work around the home and/or on the job as her protocol allows without significant limitation.    4-6 weeks        ST) Pt/caregiver will be I and consistent with HEP with use of handout as needed in order to maximize shoulder strength and ROM/flexibility.       2-3 weeks

## 2025-04-02 ENCOUNTER — TREATMENT (OUTPATIENT)
Dept: PHYSICAL THERAPY | Facility: CLINIC | Age: 75
End: 2025-04-02
Payer: MEDICARE

## 2025-04-02 DIAGNOSIS — G89.29 CHRONIC LEFT SHOULDER PAIN: ICD-10-CM

## 2025-04-02 DIAGNOSIS — Z98.890 S/P LEFT ROTATOR CUFF REPAIR: ICD-10-CM

## 2025-04-02 DIAGNOSIS — M25.512 CHRONIC LEFT SHOULDER PAIN: ICD-10-CM

## 2025-04-02 PROCEDURE — 97110 THERAPEUTIC EXERCISES: CPT | Mod: GP,CQ

## 2025-04-02 PROCEDURE — 97140 MANUAL THERAPY 1/> REGIONS: CPT | Mod: GP,CQ

## 2025-04-02 ASSESSMENT — PAIN - FUNCTIONAL ASSESSMENT: PAIN_FUNCTIONAL_ASSESSMENT: 0-10

## 2025-04-02 ASSESSMENT — PAIN SCALES - GENERAL: PAINLEVEL_OUTOF10: 2

## 2025-04-02 NOTE — PROGRESS NOTES
Physical Therapy Treatment    Patient Name: Shona Hansen  MRN: [id number]  Today's Date: 4/2/2025  Time Calculation  Start Time: 0915  Stop Time: 1000  Time Calculation (min): 45 min  PT Therapeutic Procedures Time Entry  Manual Therapy Time Entry: 12  Therapeutic Exercise Time Entry: 32                 General:  General  Reason for Referral: L RTC Repair  Referred By: Dr. Hill  General Comment: Visit  9/ 9/9  Visit# 18      Assessment:   Mild exacerbation of pain with resisted rows.   Added resisted extension in small range today with no exacerbation of pain.     Objective:   PROM 160 degrees with mild end range tightness.       Plan:  Plan to progress strength and and ROM as able per protocol limits. JA     OP PT Plan  Treatment/Interventions: Cryotherapy, Education/ Instruction, Manual therapy, Therapeutic exercises, Therapeutic activities  PT Plan: Skilled PT  PT Frequency:  (2x/wk for 4 weeks or 9 sessions)  Rehab Potential: Good  Plan of Care Agreement: Patient (Patient Goal:  Improve strength, ROM and use of L shoulder)    Current Problem  Problem List Items Addressed This Visit             ICD-10-CM    S/P left rotator cuff repair Z98.890    Chronic left shoulder pain M25.512, G89.29       Subjective Patient identified by name and date of birth.   Patient is compliant with HEP completion with no issues to report at this time.  States that she woke up more sore today.     Precautions  Precautions  Precautions Comment: Lower Fall Risk.  PMH: HTN, R trigger finger.   S/P L RTC Repair on 1/23/2025.  See Protocol    Pain  Pain Assessment: 0-10  0-10 (Numeric) Pain Score: 2  Pain Location: Shoulder  Pain Orientation: Left    Treatments:      9 weeks   Pulley Flexion 3'   UBE  fwd 3'   Finger ladder to #20 x 5   Resisted row gentle Lime band (N)   Shoulder scap retractions x 20   Shoulder rolls bwd/fwd x 20   Seated ER with wand 2 x 10   Seated flexion with wand to tolerance per protocol  2 x 10   Shoulder  "sub-max iso at the wall flex/ext/abd 10 x 5\"    Shoulder bicep 3 way 2 x 15  (2# wt) (P)   Shoulder AAROM with Large green  SB on plinth (P)  -Flex/Ext  - HADD/HABD  -CWW/CW  Supine cane flexion x10   Supine protraction with wand 2 x 10   Supine chest press 2 x 10      Wall push up with a plus x10  Shoulder flexion to 90* with 1 pound   Bent over row 1 pound x10  Ball roll up wall x10           Manual: 12'   Shoulder PROM all planes 15'      OP EDUCATION:    Access Code: [email or web address]  URL: [email or web address]  Date: 03/05/2025  Prepared by: Rosa M Mcneal     Exercises  - Seated Shoulder Flexion with Dowel to 90  - 1 x daily - 7 x weekly - 2 sets - 10 reps  - Supine Shoulder Flexion with Dowel  - 1 x daily - 7 x weekly - 2 sets - 10 reps  - Supine Shoulder Press AAROM in Abduction with Dowel  - 1 x daily - 7 x weekly - 2 sets - 10 reps  - Supine Shoulder Protraction with Dowel  - 1 x daily - 7 x weekly - 2 sets - 10 reps  Access Code: [email or web address]  URL: [email or web address]  Date: 02/24/2025  Prepared by: Scar Marroquin     Exercises  - Seated Shoulder External Rotation AAROM with Dowel  - 3 x daily - 7 x weekly - 2 sets - 10 reps - 2 seconds hold   Access Code: [email or web address]  URL: [email or web address]://[employer]/  Access Code: [email or web address]  URL: https://[employer]/  Date: 02/07/2025  Prepared by: Rosa M Mcneal     Exercises  - Seated Shoulder External Rotation AAROM with Dowel  - 1 x daily - 7 x weekly - 1 sets - 10 reps  - Seated Scapular Retraction  - 1 x daily - 7 x weekly - 2 sets - 10 reps  - Isometric Shoulder Flexion at Wall  - 1 x daily - 7 x weekly - 2 sets - 10 reps - 5 seconds hold  - Isometric Shoulder Extension at Wall  - 1 x daily - 7 x weekly - 2 sets - 10 reps - 5 seconds hold  - Isometric Shoulder Abduction at Wall  - 1 x daily - 7 x weekly - 2 sets - 10 reps - 5 seconds hold     Goals:  Active       PT Problem       PT Goal 1       " Start:  25    Expected End:  25       LTG's:  1) Improve L  shoulder strength to >= 5-/5 throughout as her protocol allows in order to facilitate ability to reach overhead and lift objects.      4-6 weeks      2) Improve  L shoulder PROM  to >= 160 deg flex/abd and 80 deg ER/IR as her protocol allows in order to better reach overhead or behind back.       4-6 weeks      3) Improve  L shoulder pain from   5/10 to <= 0-3/10 with activity in order to improve QOL.        4-6 weeks      4) Improve quick dash score by >= 5 points in order to improve QOL.    4-6 weeks      5) The pt will improve ability to raise arm/shoulder overhead, reach at various angles, lift/carry objects, dress upper body, overhead activity and return to exercise and work around the home and/or on the job as her protocol allows without significant limitation.    4-6 weeks        ST) Pt/caregiver will be I and consistent with HEP with use of handout as needed in order to maximize shoulder strength and ROM/flexibility.       2-3 weeks

## 2025-04-09 ENCOUNTER — TREATMENT (OUTPATIENT)
Dept: PHYSICAL THERAPY | Facility: CLINIC | Age: 75
End: 2025-04-09
Payer: MEDICARE

## 2025-04-09 DIAGNOSIS — M25.512 CHRONIC LEFT SHOULDER PAIN: ICD-10-CM

## 2025-04-09 DIAGNOSIS — Z98.890 S/P LEFT ROTATOR CUFF REPAIR: ICD-10-CM

## 2025-04-09 DIAGNOSIS — G89.29 CHRONIC LEFT SHOULDER PAIN: ICD-10-CM

## 2025-04-09 PROCEDURE — 97110 THERAPEUTIC EXERCISES: CPT | Mod: GP,CQ

## 2025-04-09 PROCEDURE — 97140 MANUAL THERAPY 1/> REGIONS: CPT | Mod: GP,CQ

## 2025-04-09 ASSESSMENT — PAIN - FUNCTIONAL ASSESSMENT: PAIN_FUNCTIONAL_ASSESSMENT: 0-10

## 2025-04-09 NOTE — PROGRESS NOTES
Physical Therapy Treatment    Patient Name: Shona Hansen  MRN: [id number]  Today's Date: 4/9/2025  Time Calculation  Start Time: 0917  Stop Time: 1001  Time Calculation (min): 44 min  PT Therapeutic Procedures Time Entry  Manual Therapy Time Entry: 10  Therapeutic Exercise Time Entry: 34                 General:  General  Reason for Referral: L RTC Repair  Referred By: Dr. Hill  General Comment: Visit 1/8 9/9 9/9  Visit# 19  Assessment:   Added new exercises to HEP with correct technique demonstrated and patient verbalizing understanding of instruction. (see below)    Progressed to using half pound with wand exercises with minimal exacerbation of pain.   Progressed to wall slides today to continue working toward goals.     Objective:   Fair eccentric control with wall slides.       Plan:  Plan to progress strength and ROM as able for improved hoda    OP PT Plan  Treatment/Interventions: Cryotherapy, Education/ Instruction, Manual therapy, Therapeutic exercises, Therapeutic activities  PT Plan: Skilled PT  PT Frequency:  (2x/wk for 4 weeks or 9 sessions)  Rehab Potential: Good  Plan of Care Agreement: Patient (Patient Goal:  Improve strength, ROM and use of L shoulder)    Current Problem  Problem List Items Addressed This Visit             ICD-10-CM    S/P left rotator cuff repair Z98.890    Chronic left shoulder pain M25.512, G89.29       Subjective  Patient identified by name and date of birth. Denies any recent falls.  Patient is compliant with HEP completion with no issues to report at this time.  States that she mad MD appointment for follow up on 5/5. States that he has been trying to sleep in bed with increased pain. She is getting around 5 hours of sleep at night.     Precautions  Precautions  Precautions Comment: Lower Fall Risk.  PMH: HTN, R trigger finger.   S/P L RTC Repair on 1/23/2025.  See Protocol    Pain  Pain Assessment: 0-10  0-10 (Numeric) Pain Score:  (.5)  Pain Location: Shoulder  Pain  "Orientation: Left    Treatments:      9 weeks   Pulley Flexion 3'   UBE  fwd 3'   Finger ladder to #20 x 5 (X)   Wall slide flexion x 10 (N)   Resisted row gentle Lime band 2 x 10   Resisted ext gentle Lime band 2 x 10 (N)   Shoulder scap retractions x 20   Shoulder rolls bwd/fwd x 20   Seated ER with wand 2 x 10   Seated flexion with wand to tolerance per protocol  2 x 10  (P 1/2#)   Shoulder sub-max iso at the wall flex/ext/abd 10 x 5\"    Shoulder bicep 3 way 2 x 15  (2# wt)   Shoulder AAROM with Large green  SB on plinth (P)  -Flex/Ext  - HADD/HABD  -CWW/CW  Supine cane flexion x10  (P 1/2#)   Supine protraction with wand 2 x 10 (P 1/2#)   Supine chest press 2 x 10 (P 1/2#)      Wall push up with a plus x10  Shoulder flexion to 90* with 1 pound   Bent over row 1 pound x10  Ball roll up wall x10        Manual: 12'   Shoulder PROM all planes 15'    OP EDUCATION:   Access Code: [email or web address]  URL: [email or web address]  Date: 04/09/2025 Lime band   Prepared by: Rosa M Mcneal    Exercises  - Standing Bilateral Low Shoulder Row with Anchored Resistance  - 1 x daily - 7 x weekly - 2 sets - 10 reps  - Shoulder extension with resistance - Neutral  - 1 x daily - 7 x weekly - 2 sets - 10 reps  - Standing shoulder flexion wall slides  - 1 x daily - 7 x weekly - 2 sets - 10 reps    Access Code: [email or web address]  URL: [email or web address]/  Date: 03/05/2025  Prepared by: Rosa M Mcneal     Exercises  - Seated Shoulder Flexion with Dowel to 90  - 1 x daily - 7 x weekly - 2 sets - 10 reps  - Supine Shoulder Flexion with Dowel  - 1 x daily - 7 x weekly - 2 sets - 10 reps  - Supine Shoulder Press AAROM in Abduction with Dowel  - 1 x daily - 7 x weekly - 2 sets - 10 reps  - Supine Shoulder Protraction with Dowel  - 1 x daily - 7 x weekly - 2 sets - 10 reps  Access Code: [email or web address]  URL: [email or web address]  Date: 02/24/2025  Prepared by: Scar Marroquin     Exercises  - Seated " Shoulder External Rotation AAROM with Dowel  - 3 x daily - 7 x weekly - 2 sets - 10 reps - 2 seconds hold   Access Code: [email or web address]  URL: [email or web address]  Access Code: [email or web address]  URL: [email or web address]  Date: 2025  Prepared by: Rosa M Mcneal     Exercises  - Seated Shoulder External Rotation AAROM with Dowel  - 1 x daily - 7 x weekly - 1 sets - 10 reps  - Seated Scapular Retraction  - 1 x daily - 7 x weekly - 2 sets - 10 reps  - Isometric Shoulder Flexion at Wall  - 1 x daily - 7 x weekly - 2 sets - 10 reps - 5 seconds hold  - Isometric Shoulder Extension at Wall  - 1 x daily - 7 x weekly - 2 sets - 10 reps - 5 seconds hold  - Isometric Shoulder Abduction at Wall  - 1 x daily - 7 x weekly - 2 sets - 10 reps - 5 seconds hold     Goals:  Active       PT Problem       PT Goal 1       Start:  25    Expected End:  25       LTG's:  1) Improve L  shoulder strength to >= 5-/5 throughout as her protocol allows in order to facilitate ability to reach overhead and lift objects.      4-6 weeks      2) Improve  L shoulder PROM  to >= 160 deg flex/abd and 80 deg ER/IR as her protocol allows in order to better reach overhead or behind back.       4-6 weeks      3) Improve  L shoulder pain from   5/10 to <= 0-3/10 with activity in order to improve QOL.        4-6 weeks      4) Improve quick dash score by >= 5 points in order to improve QOL.    4-6 weeks      5) The pt will improve ability to raise arm/shoulder overhead, reach at various angles, lift/carry objects, dress upper body, overhead activity and return to exercise and work around the home and/or on the job as her protocol allows without significant limitation.    4-6 weeks        ST) Pt/caregiver will be I and consistent with HEP with use of handout as needed in order to maximize shoulder strength and ROM/flexibility.       2-3 weeks

## 2025-04-11 ENCOUNTER — TREATMENT (OUTPATIENT)
Dept: PHYSICAL THERAPY | Facility: CLINIC | Age: 75
End: 2025-04-11
Payer: MEDICARE

## 2025-04-11 DIAGNOSIS — Z98.890 S/P LEFT ROTATOR CUFF REPAIR: ICD-10-CM

## 2025-04-11 DIAGNOSIS — G89.29 CHRONIC LEFT SHOULDER PAIN: ICD-10-CM

## 2025-04-11 DIAGNOSIS — M25.512 CHRONIC LEFT SHOULDER PAIN: ICD-10-CM

## 2025-04-11 PROCEDURE — 97110 THERAPEUTIC EXERCISES: CPT | Mod: GP,CQ

## 2025-04-11 PROCEDURE — 97140 MANUAL THERAPY 1/> REGIONS: CPT | Mod: GP,CQ

## 2025-04-11 ASSESSMENT — PAIN - FUNCTIONAL ASSESSMENT: PAIN_FUNCTIONAL_ASSESSMENT: 0-10

## 2025-04-11 ASSESSMENT — PAIN SCALES - GENERAL: PAINLEVEL_OUTOF10: 1

## 2025-04-11 NOTE — PROGRESS NOTES
Physical Therapy Treatment    Patient Name: Shona Hansen  MRN: [id number]  Today's Date: 4/11/2025  Time Calculation  Start Time: 1046  Stop Time: 1129  Time Calculation (min): 43 min  PT Therapeutic Procedures Time Entry  Manual Therapy Time Entry: 8  Therapeutic Exercise Time Entry: 34                 General:  General  Reason for Referral: L RTC Repair  Referred By: Dr. Hill  General Comment: Visit 2/8 9/9 9/9  Visit# 20    Assessment:   Progressed to new PRE's today.   Mild exacerbation of pain with resisted IR along anterior shoulder. Only completed 10 reps. No increase pain with resistive ER.     Objective:   Uneven elevation observed with anterior delt raises but minimal compensations observed through GH elevation.     Plan:  Plan to progress strength as able per protocol. Progress HEP next visit. JA     OP PT Plan  Treatment/Interventions: Cryotherapy, Education/ Instruction, Manual therapy, Therapeutic exercises, Therapeutic activities  PT Plan: Skilled PT  PT Frequency:  (2x/wk for 4 weeks or 9 sessions)  Rehab Potential: Good  Plan of Care Agreement: Patient (Patient Goal:  Improve strength, ROM and use of L shoulder)    Current Problem  Problem List Items Addressed This Visit             ICD-10-CM    S/P left rotator cuff repair Z98.890    Chronic left shoulder pain M25.512, G89.29       Subjective Patient identified by name and date of birth. Denies any recent falls.  Patient is compliant with HEP completion with no issues to report at this time.  States that she is having trouble sleeping in her bed. Notes that she is not comfortable laying on her back and cannot lay on side. Wakes up with discomfort and lasted about 2 hours in her bed last night.     Precautions  Precautions  Precautions Comment: Lower Fall Risk.  PMH: HTN, R trigger finger.   S/P L RTC Repair on 1/23/2025.  See Protocol    Pain  Pain Assessment: 0-10  0-10 (Numeric) Pain Score: 1  Pain Location: Shoulder  Pain Orientation:  "Left    Treatments:    9 weeks   Pulley Flexion 3'   UBE  fwd 3'   Finger ladder to #20 x 5 (X)   Wall slide flexion 2  x 10 (P reps)  (N scation)   Wall protraction 2 x 10 (N)   Active shoulder anterior delt raise 2 x 10 (N)   Resisted row gentle Mint band 2 x 10 (P resistance)   Resisted ext gentle Mint band 2 x 10 (P resistance)   Resisted ER [place] 2 x 10 (N)   Resisted IR x 10 (N/Pain)   Shoulder scap retractions x 20   Shoulder rolls bwd/fwd x 20   Seated ER with wand 2 x 10   Seated flexion with wand to tolerance per protocol  2 x 10  (P 1/2#)   Shoulder sub-max iso at the wall flex/ext/abd 10 x 5\"  (x not today)   Shoulder bicep 3 way 2 x 15  (2# wt)   (x not today)   Shoulder AAROM with Mint green  SB on plinth (P resistance)  -Flex/Ext  - HADD/HABD  - CWW/CW  Supine cane flexion x10  (P 1/2#)   Supine protraction with wand 2 x 10 (P 1/2#)  Supine chest press 2 x 10 (P 1/2#)    Wall push up with a plus x10  Shoulder flexion to 90* with 1 pound   Bent over row 1 pound x10  Ball roll up wall x10        Manual: 12'   Shoulder PROM all planes 15'    OP EDUCATION:   Access Code: [email or web address]  URL: [email or web address]://[email or web address]/  Date: 04/09/2025 Lime band   Prepared by: Rosa M Mcneal     Exercises  - Standing Bilateral Low Shoulder Row with Anchored Resistance  - 1 x daily - 7 x weekly - 2 sets - 10 reps  - Shoulder extension with resistance - Neutral  - 1 x daily - 7 x weekly - 2 sets - 10 reps  - Standing shoulder flexion wall slides  - 1 x daily - 7 x weekly - 2 sets - 10 reps     Access Code: [email or web address]  URL: [email or web address]://[email or web address]/  Date: 03/05/2025  Prepared by: Rosa M Mcneal     Exercises  - Seated Shoulder Flexion with Dowel to 90  - 1 x daily - 7 x weekly - 2 sets - 10 reps  - Supine Shoulder Flexion with Dowel  - 1 x daily - 7 x weekly - 2 sets - 10 reps  - Supine Shoulder Press AAROM in Abduction with Dowel  - 1 x daily - 7 x weekly - 2 sets - 10 reps  - Supine Shoulder " Protraction with Dowel  - 1 x daily - 7 x weekly - 2 sets - 10 reps  Access Code: [email or web address]  URL: [email or web address]/  Date: 2025  Prepared by: Scar Marroquin     Exercises  - Seated Shoulder External Rotation AAROM with Dowel  - 3 x daily - 7 x weekly - 2 sets - 10 reps - 2 seconds hold   Access Code: [email or web address]  URL: https://[employer]/  Access Code: [email or web address]  URL: https://[employer]/  Date: 2025  Prepared by: Rosa M Mcneal     Exercises  - Seated Shoulder External Rotation AAROM with Dowel  - 1 x daily - 7 x weekly - 1 sets - 10 reps  - Seated Scapular Retraction  - 1 x daily - 7 x weekly - 2 sets - 10 reps  - Isometric Shoulder Flexion at Wall  - 1 x daily - 7 x weekly - 2 sets - 10 reps - 5 seconds hold  - Isometric Shoulder Extension at Wall  - 1 x daily - 7 x weekly - 2 sets - 10 reps - 5 seconds hold  - Isometric Shoulder Abduction at Wall  - 1 x daily - 7 x weekly - 2 sets - 10 reps - 5 seconds hold     Goals:  Active       PT Problem       PT Goal 1       Start:  25    Expected End:  25       LTG's:  1) Improve L  shoulder strength to >= 5-/5 throughout as her protocol allows in order to facilitate ability to reach overhead and lift objects.      4-6 weeks      2) Improve  L shoulder PROM  to >= 160 deg flex/abd and 80 deg ER/IR as her protocol allows in order to better reach overhead or behind back.       4-6 weeks      3) Improve  L shoulder pain from   5/10 to <= 0-3/10 with activity in order to improve QOL.        4-6 weeks      4) Improve quick dash score by >= 5 points in order to improve QOL.    4-6 weeks      5) The pt will improve ability to raise arm/shoulder overhead, reach at various angles, lift/carry objects, dress upper body, overhead activity and return to exercise and work around the home and/or on the job as her protocol allows without significant limitation.    4-6 weeks        ST)  Pt/caregiver will be I and consistent with HEP with use of handout as needed in order to maximize shoulder strength and ROM/flexibility.       2-3 weeks

## 2025-04-16 ENCOUNTER — TREATMENT (OUTPATIENT)
Dept: PHYSICAL THERAPY | Facility: CLINIC | Age: 75
End: 2025-04-16
Payer: MEDICARE

## 2025-04-16 DIAGNOSIS — M25.512 CHRONIC LEFT SHOULDER PAIN: ICD-10-CM

## 2025-04-16 DIAGNOSIS — G89.29 CHRONIC LEFT SHOULDER PAIN: ICD-10-CM

## 2025-04-16 DIAGNOSIS — Z98.890 S/P LEFT ROTATOR CUFF REPAIR: ICD-10-CM

## 2025-04-16 PROCEDURE — 97110 THERAPEUTIC EXERCISES: CPT | Mod: GP,CQ

## 2025-04-16 PROCEDURE — 97140 MANUAL THERAPY 1/> REGIONS: CPT | Mod: GP,CQ

## 2025-04-16 ASSESSMENT — PAIN SCALES - GENERAL: PAINLEVEL_OUTOF10: 0 - NO PAIN

## 2025-04-16 ASSESSMENT — PAIN - FUNCTIONAL ASSESSMENT: PAIN_FUNCTIONAL_ASSESSMENT: 0-10

## 2025-04-16 NOTE — PROGRESS NOTES
"Physical Therapy Treatment    Patient Name: Shona Hansen  MRN: [id number]  Today's Date: 4/16/2025  Time Calculation  Start Time: 0920  Stop Time: 1000  Time Calculation (min): 40 min  PT Therapeutic Procedures Time Entry  Manual Therapy Time Entry: 8  Therapeutic Exercise Time Entry: 30         Current Problem  Problem List Items Addressed This Visit           ICD-10-CM    S/P left rotator cuff repair Z98.890    Chronic left shoulder pain M25.512, G89.29       Subjective   Pt.'s name and birthday confirmed.  Pt. Is compliant with HEP.  Pt.  Has no c/o sx.'s at this time.      Reason for Referral: L RTC Repair  Referred By: Dr. Hill  General Comment: Visit 3/8    9/9     9/9      Precautions  Precautions  Precautions Comment: Lower Fall Risk.  PMH: HTN, R trigger finger.   S/P L RTC Repair on 1/23/2025.  See Protocol      Pain  Pain Assessment: 0-10  0-10 (Numeric) Pain Score: 0 - No pain  Pain Location: Shoulder  Pain Orientation: Left    Objective:    Treatments:  9 weeks   Pulley Flexion 3'   UBE  fwd 3'   Finger ladder to #20 x 5 (X)   Wall slide flexion 2  x 10  (N scation)   Wall protraction 2 x 10   Active shoulder anterior delt raise 2 x 10   Resisted row gentle Mint band 2 x 10   Resisted ext gentle Mint band 2 x 10   Resisted ER Hale 2 x 10   Resisted IR x 10   Shoulder scap retractions x 20   Shoulder rolls bwd/fwd x 20   Seated ER with wand 2 x 10   Seated flexion with wand to tolerance per protocol  2 x 10  (P 1/2#)   Shoulder sub-max iso at the wall flex/ext/abd 10 x 5\"  (x not today)   Shoulder bicep 3 way 2 x 15  (2# wt)   (x not today)   Shoulder AAROM with Mint green  SB on plinth (P resistance)  -Flex/Ext  - HADD/HABD  - CWW/CW  Supine cane flexion x10  (P 1/2#)   Supine protraction with wand 2 x 10 (P 1/2#)  Supine chest press 2 x 10 (P 1/2#)    Wall push up with a plus x10  Shoulder flexion to 90* with 1 pound (X)  Bent over row 1 pound 2 x10 (P)  Ball roll up wall x10 (X)        Manual: 12'   " Shoulder PROM all planes 15'           OP EDUCATION:  Access Code: [email or web address]  URL: [email or web address]  Date: 04/09/2025 Lime band   Prepared by: Rosa M Mcneal     Exercises  - Standing Bilateral Low Shoulder Row with Anchored Resistance  - 1 x daily - 7 x weekly - 2 sets - 10 reps  - Shoulder extension with resistance - Neutral  - 1 x daily - 7 x weekly - 2 sets - 10 reps  - Standing shoulder flexion wall slides  - 1 x daily - 7 x weekly - 2 sets - 10 reps     Access Code: [email or web address]  URL: [email or web address]  Date: 03/05/2025  Prepared by: Rosa M Mcneal     Exercises  - Seated Shoulder Flexion with Dowel to 90  - 1 x daily - 7 x weekly - 2 sets - 10 reps  - Supine Shoulder Flexion with Dowel  - 1 x daily - 7 x weekly - 2 sets - 10 reps  - Supine Shoulder Press AAROM in Abduction with Dowel  - 1 x daily - 7 x weekly - 2 sets - 10 reps  - Supine Shoulder Protraction with Dowel  - 1 x daily - 7 x weekly - 2 sets - 10 reps  Access Code: [email or web address]  URL: [email or web address]  Date: 02/24/2025  Prepared by: Scar Marroquin     Exercises  - Seated Shoulder External Rotation AAROM with Dowel  - 3 x daily - 7 x weekly - 2 sets - 10 reps - 2 seconds hold   Access Code: [email or web address]  URL: [email or web address]  Access Code: [email or web address]  URL: [email or web address]/  Date: 02/07/2025  Prepared by: Rosa M Mcneal     Exercises  - Seated Shoulder External Rotation AAROM with Dowel  - 1 x daily - 7 x weekly - 1 sets - 10 reps  - Seated Scapular Retraction  - 1 x daily - 7 x weekly - 2 sets - 10 reps  - Isometric Shoulder Flexion at Wall  - 1 x daily - 7 x weekly - 2 sets - 10 reps - 5 seconds hold  - Isometric Shoulder Extension at Wall  - 1 x daily - 7 x weekly - 2 sets - 10 reps - 5 seconds hold  - Isometric Shoulder Abduction at Wall  - 1 x daily - 7 x weekly - 2 sets - 10 reps - 5 seconds hold               Assessment:  Pt.  A few minutes late d/t traffic.  Verbal cues needed with TE, no c/o increased pain.  Pt. Tolerated resisted IR this date.  End range discomfort noted with passive abduction.         Plan:  Continue with strengthening, ROM and flexibility per tolerance to improve daily activities/work duties with little to no difficulty.  MB       OP PT Plan  Treatment/Interventions: Cryotherapy, Education/ Instruction, Manual therapy, Therapeutic exercises, Therapeutic activities  PT Plan: Skilled PT  PT Frequency:  (2x/wk for 4 weeks or 9 sessions)  Rehab Potential: Good  Plan of Care Agreement: Patient (Patient Goal:  Improve strength, ROM and use of L shoulder)              Goals:  Active       PT Problem       PT Goal 1       Start:  25    Expected End:  25       LTG's:  1) Improve L  shoulder strength to >= 5-/5 throughout as her protocol allows in order to facilitate ability to reach overhead and lift objects.      4-6 weeks      2) Improve  L shoulder PROM  to >= 160 deg flex/abd and 80 deg ER/IR as her protocol allows in order to better reach overhead or behind back.       4-6 weeks      3) Improve  L shoulder pain from   5/10 to <= 0-3/10 with activity in order to improve QOL.        4-6 weeks      4) Improve quick dash score by >= 5 points in order to improve QOL.    4-6 weeks      5) The pt will improve ability to raise arm/shoulder overhead, reach at various angles, lift/carry objects, dress upper body, overhead activity and return to exercise and work around the home and/or on the job as her protocol allows without significant limitation.    4-6 weeks        ST) Pt/caregiver will be I and consistent with HEP with use of handout as needed in order to maximize shoulder strength and ROM/flexibility.       2-3 weeks

## 2025-04-18 ENCOUNTER — TREATMENT (OUTPATIENT)
Dept: PHYSICAL THERAPY | Facility: CLINIC | Age: 75
End: 2025-04-18
Payer: MEDICARE

## 2025-04-18 DIAGNOSIS — M25.512 CHRONIC LEFT SHOULDER PAIN: ICD-10-CM

## 2025-04-18 DIAGNOSIS — G89.29 CHRONIC LEFT SHOULDER PAIN: ICD-10-CM

## 2025-04-18 DIAGNOSIS — Z98.890 S/P LEFT ROTATOR CUFF REPAIR: ICD-10-CM

## 2025-04-18 PROCEDURE — 97110 THERAPEUTIC EXERCISES: CPT | Mod: GP,CQ

## 2025-04-18 PROCEDURE — 97140 MANUAL THERAPY 1/> REGIONS: CPT | Mod: GP,CQ

## 2025-04-18 ASSESSMENT — PAIN SCALES - GENERAL: PAINLEVEL_OUTOF10: 1

## 2025-04-18 ASSESSMENT — PAIN - FUNCTIONAL ASSESSMENT: PAIN_FUNCTIONAL_ASSESSMENT: 0-10

## 2025-04-18 NOTE — PROGRESS NOTES
Physical Therapy Treatment    Patient Name: Shona Hansen  MRN: [id number]  Today's Date: 4/18/2025  Time Calculation  Start Time: 1441  Stop Time: 1528  Time Calculation (min): 47 min  PT Therapeutic Procedures Time Entry  Manual Therapy Time Entry: 15  Therapeutic Exercise Time Entry: 30                 General:  General  Reason for Referral: L RTC Repair  Referred By: Dr. Hill  General Comment: Visit 4/8  (9/9) (9/9)  Visit# 22  Assessment:   Added new exercises to HEP with correct technique demonstrated and patient verbalizing understanding of instruction. (see below)  Reviewed current HEP program to help patient determine which exercises are priority at this time.   Completed stretches at beginning of session due to current pain levels.   Decreased overall symptoms upon leaving clinic.     Objective:   Mild pain with passive flexion between 90 and 130 degrees. Completed mild shoulder distraction  throughout range with decreased pain.   Local tenderness along bicep tendon and along incision points at top of shoulder.     Plan:  Plan to continue to progress strength and ROM for improved daily function. JA     OP PT Plan  Treatment/Interventions: Cryotherapy, Education/ Instruction, Manual therapy, Therapeutic exercises, Therapeutic activities  PT Plan: Skilled PT  PT Frequency:  (2x/wk for 4 weeks or 9 sessions)  Rehab Potential: Good  Plan of Care Agreement: Patient (Patient Goal:  Improve strength, ROM and use of L shoulder)    Current Problem  Problem List Items Addressed This Visit           ICD-10-CM    S/P left rotator cuff repair Z98.890    Chronic left shoulder pain M25.512, G89.29       Subjective Patient identified by name and date of birth. Denies any recent falls.  Patient is compliant with HEP completion with no issues to report at this time.  States that she had some popping in the front of shoulder when today with coinciding pain. States that she was doing paperwork.  "      Precautions  Precautions  Precautions Comment: Lower Fall Risk.  PMH: HTN, R trigger finger.   S/P L RTC Repair on 1/23/2025.  See Protocol    Pain  Pain Assessment: 0-10  0-10 (Numeric) Pain Score: 1  Pain Location: Shoulder  Pain Orientation: Left    Treatments:  9 weeks   Pulley Flexion 3'   UBE  fwd 3'   Finger ladder to #20 x 5 (X)   Wall slide flexion 2  x 10  (scaption)   Wall protraction 2 x 10   Active shoulder anterior delt raise 2 x 10   Resisted row gentle Mint band 2 x 10   Resisted ext gentle Mint band 2 x 10   Resisted ER Major 2 x 10   Resisted IR x 10   Shoulder scap retractions x 20   Shoulder rolls bwd/fwd x 20   Seated ER with wand 2 x 10   Seated flexion with wand to tolerance per protocol  2 x 10  (P 1/2#)   Shoulder sub-max iso at the wall flex/ext/abd 10 x 5\"  (x not today)   Shoulder bicep 3 way 2 x 15  (2# wt)   (x not today)   Shoulder AAROM with Mint green  SB on plinth (P resistance)  -Flex/Ext  - HADD/HABD  - CWW/CW  Supine cane flexion x10  ( 1/2#)   Supine protraction with wand 2 x 10 (1/2#)  Supine chest press 2 x 10 (1/2#)    Wall push up with a plus x10  Shoulder flexion to 90* with 1 pound (X)  Bent over row 1 pound 2 x10 (P)  Ball roll up wall x10 (X)        Manual: 12'   Shoulder PROM all planes 15'     OP EDUCATION:   Access Code: [email or web address]  URL: [email or web address]  Date: 04/18/2025  Prepared by: Rosa M Mcneal    Exercises  - Shoulder Internal Rotation with Resistance  - 1 x daily - 7 x weekly - 2 sets - 10 reps  - Shoulder External Rotation with Anchored Resistance  - 1 x daily - 7 x weekly - 2 sets - 10 reps  Access Code: [email or web address]  URL: [email or web address]  Date: 04/09/2025 Lime band   Prepared by: Rosa M Mcneal     Exercises  - Standing Bilateral Low Shoulder Row with Anchored Resistance  - 1 x daily - 7 x weekly - 2 sets - 10 reps  - Shoulder extension with resistance - Neutral  - 1 x daily - 7 x weekly - 2 sets - 10 " reps  - Standing shoulder flexion wall slides  - 1 x daily - 7 x weekly - 2 sets - 10 reps     Access Code: [email or web address]  URL: [email or web address]  Date: 03/05/2025  Prepared by: Rosa M Mcneal     Exercises  - Seated Shoulder Flexion with Dowel to 90  - 1 x daily - 7 x weekly - 2 sets - 10 reps  - Supine Shoulder Flexion with Dowel  - 1 x daily - 7 x weekly - 2 sets - 10 reps  - Supine Shoulder Press AAROM in Abduction with Dowel  - 1 x daily - 7 x weekly - 2 sets - 10 reps  - Supine Shoulder Protraction with Dowel  - 1 x daily - 7 x weekly - 2 sets - 10 reps  Access Code: [email or web address]  URL: [email or web address]  Date: 02/24/2025  Prepared by: Scar Marroquin     Exercises  - Seated Shoulder External Rotation AAROM with Dowel  - 3 x daily - 7 x weekly - 2 sets - 10 reps - 2 seconds hold   Access Code: [email or web address]  URL: [email or web address]  Access Code: [email or web address]  URL: [email or web address]  Date: 02/07/2025  Prepared by: Rosa M Mcneal     Exercises  - Seated Shoulder External Rotation AAROM with Dowel  - 1 x daily - 7 x weekly - 1 sets - 10 reps  - Seated Scapular Retraction  - 1 x daily - 7 x weekly - 2 sets - 10 reps  - Isometric Shoulder Flexion at Wall  - 1 x daily - 7 x weekly - 2 sets - 10 reps - 5 seconds hold  - Isometric Shoulder Extension at Wall  - 1 x daily - 7 x weekly - 2 sets - 10 reps - 5 seconds hold  - Isometric Shoulder Abduction at Wall  - 1 x daily - 7 x weekly - 2 sets - 10 reps - 5 seconds hold         Goals:  Active       PT Problem       PT Goal 1       Start:  01/29/25    Expected End:  04/25/25       LTG's:  1) Improve L  shoulder strength to >= 5-/5 throughout as her protocol allows in order to facilitate ability to reach overhead and lift objects.      4-6 weeks      2) Improve  L shoulder PROM  to >= 160 deg flex/abd and 80 deg ER/IR as her protocol allows in order to better reach overhead or behind back.        4-6 weeks      3) Improve  L shoulder pain from   5/10 to <= 0-3/10 with activity in order to improve QOL.        4-6 weeks      4) Improve quick dash score by >= 5 points in order to improve QOL.    4-6 weeks      5) The pt will improve ability to raise arm/shoulder overhead, reach at various angles, lift/carry objects, dress upper body, overhead activity and return to exercise and work around the home and/or on the job as her protocol allows without significant limitation.    4-6 weeks        ST) Pt/caregiver will be I and consistent with HEP with use of handout as needed in order to maximize shoulder strength and ROM/flexibility.       2-3 weeks

## 2025-04-23 ENCOUNTER — TREATMENT (OUTPATIENT)
Dept: PHYSICAL THERAPY | Facility: CLINIC | Age: 75
End: 2025-04-23
Payer: MEDICARE

## 2025-04-23 DIAGNOSIS — G89.29 CHRONIC LEFT SHOULDER PAIN: ICD-10-CM

## 2025-04-23 DIAGNOSIS — M25.512 CHRONIC LEFT SHOULDER PAIN: ICD-10-CM

## 2025-04-23 DIAGNOSIS — Z98.890 S/P LEFT ROTATOR CUFF REPAIR: ICD-10-CM

## 2025-04-23 PROCEDURE — 97140 MANUAL THERAPY 1/> REGIONS: CPT | Mod: GP,CQ

## 2025-04-23 PROCEDURE — 97110 THERAPEUTIC EXERCISES: CPT | Mod: GP,CQ

## 2025-04-23 ASSESSMENT — PAIN SCALES - GENERAL: PAINLEVEL_OUTOF10: 0 - NO PAIN

## 2025-04-23 ASSESSMENT — PAIN - FUNCTIONAL ASSESSMENT: PAIN_FUNCTIONAL_ASSESSMENT: 0-10

## 2025-04-23 NOTE — PROGRESS NOTES
Physical Therapy Treatment    Patient Name: Shona Hansen  MRN: [id number]  Today's Date: 4/23/2025  Time Calculation  Start Time: 1045  Stop Time: 1130  Time Calculation (min): 45 min  PT Therapeutic Procedures Time Entry  Manual Therapy Time Entry: 8  Therapeutic Exercise Time Entry: 35                 General:  General  Reason for Referral: L RTC Repair  Referred By: Dr. Hill  General Comment: Visit 5/8  (9/9) (9/9)  Visit# 23      Assessment:   Added new exercises to HEP with correct technique demonstrated and patient verbalizing understanding of instruction. (see below)  Progressed PRE's this date per post op status per protocol.   Mild exacerbation of pain with new exercises.     Objective:   47 degrees passive ER in 45 degree ABD.   150 degrees active flexion in supine     Plan:  Plan to progress strength and ROM per current post op status per protocol. JA    OP PT Plan  Treatment/Interventions: Cryotherapy, Education/ Instruction, Manual therapy, Therapeutic exercises, Therapeutic activities  PT Plan: Skilled PT  PT Frequency:  (2x/wk for 4 weeks or 9 sessions)  Rehab Potential: Good  Plan of Care Agreement: Patient (Patient Goal:  Improve strength, ROM and use of L shoulder)    Current Problem  Problem List Items Addressed This Visit           ICD-10-CM    S/P left rotator cuff repair Z98.890    Chronic left shoulder pain M25.512, G89.29       Subjective Patient identified by name and date of birth. Denies any recent falls.  Patient is compliant with HEP completion with no issues to report at this time.  States that she was cleaning off her deck and she did not have much exacerbation of pain.     Precautions  Precautions  Precautions Comment: Lower Fall Risk.  PMH: HTN, R trigger finger.   S/P L RTC Repair on 1/23/2025.  See Protocol    Pain  Pain Assessment: 0-10  0-10 (Numeric) Pain Score: 0 - No pain  Pain Location: Shoulder  Pain Orientation: Left    Treatments:  12 weeks /6 days (4/23/25)    There EX:  "35'  UBE  fwd 3'   Wall slide flexion 2  x 10  (scaption)   Wall flexion with slow eccentric lowering x 10 (N)   Wall protraction 2 x 10   Active shoulder anterior delt raise to 90* 2 x 10 (P 1#)  Resisted row gentle Mint band 2 x 10   Resisted ext gentle Mint band 2 x 10   Resisted ER Wilcox x 10  (P Lime green)   Resisted IR x 10 Orange x 10 (P Lime green)  Seated ER with wand 2 x 10   Supine H ABD 2 x 10 Orange  (N)   Supine cane flexion x10  (P to active no weight)   Supine protraction with wand 2 x 10 (1/2#) (X)   Supine chest press 2 x 10 (1/2#) (X)   Wall push up with a plus x 10  Bent over row 1 pound 2 x10 (P)  Ball roll up wall x10 (X)       Not completed:   Pulley Flexion 3' (X)   Finger ladder to #20 x 5 (X)   Shoulder scap retractions x 20 (X)   Shoulder rolls bwd/fwd x 20 (X)   Seated flexion with wand to tolerance per protocol  2 x 10  (P 1/2#) (X)   Shoulder sub-max iso at the wall flex/ext/abd 10 x 5\"  (x )   Shoulder bicep 3 way 2 x 15  (2# wt)   (x not today)   Shoulder AAROM with SB on plinth (X)   -Flex/Ext  - HADD/HABD  - CWW/CW    Manual: 8'   Shoulder PROM all planes 15'     OP EDUCATION:   Access Code: [email or web address]  URL: [email or web address]  Date: 04/23/2025  Prepared by: Rosa M Mcneal    Exercises  - Supine Shoulder Flexion Extension Full Range AROM  - 1 x daily - 7 x weekly - 2 sets - 10 reps  - Supine Shoulder Horizontal Abduction with Resistance  - 1 x daily - 7 x weekly - 2 sets - 10 reps  - Seated Shoulder Flexion with Dumbbells  - 1 x daily - 7 x weekly - 2 sets - 10 reps    Goals:  Active       PT Problem       PT Goal 1       Start:  01/29/25    Expected End:  04/25/25       LTG's:  1) Improve L  shoulder strength to >= 5-/5 throughout as her protocol allows in order to facilitate ability to reach overhead and lift objects.      4-6 weeks      2) Improve  L shoulder PROM  to >= 160 deg flex/abd and 80 deg ER/IR as her protocol allows in order to better reach " overhead or behind back.       4-6 weeks      3) Improve  L shoulder pain from   5/10 to <= 0-3/10 with activity in order to improve QOL.        4-6 weeks      4) Improve quick dash score by >= 5 points in order to improve QOL.    4-6 weeks      5) The pt will improve ability to raise arm/shoulder overhead, reach at various angles, lift/carry objects, dress upper body, overhead activity and return to exercise and work around the home and/or on the job as her protocol allows without significant limitation.    4-6 weeks        ST) Pt/caregiver will be I and consistent with HEP with use of handout as needed in order to maximize shoulder strength and ROM/flexibility.       2-3 weeks

## 2025-04-25 ENCOUNTER — TREATMENT (OUTPATIENT)
Dept: PHYSICAL THERAPY | Facility: CLINIC | Age: 75
End: 2025-04-25
Payer: MEDICARE

## 2025-04-25 DIAGNOSIS — Z98.890 S/P LEFT ROTATOR CUFF REPAIR: ICD-10-CM

## 2025-04-25 DIAGNOSIS — G89.29 CHRONIC LEFT SHOULDER PAIN: ICD-10-CM

## 2025-04-25 DIAGNOSIS — M25.512 CHRONIC LEFT SHOULDER PAIN: ICD-10-CM

## 2025-04-25 PROCEDURE — 97110 THERAPEUTIC EXERCISES: CPT | Mod: GP,CQ

## 2025-04-25 PROCEDURE — 97140 MANUAL THERAPY 1/> REGIONS: CPT | Mod: GP,CQ

## 2025-04-25 ASSESSMENT — PAIN - FUNCTIONAL ASSESSMENT: PAIN_FUNCTIONAL_ASSESSMENT: 0-10

## 2025-04-25 NOTE — PROGRESS NOTES
Physical Therapy Treatment    Patient Name: Shona Hansen  MRN: [id number]  Today's Date: 4/25/2025  Time Calculation  Start Time: 1001  Stop Time: 1044  Time Calculation (min): 43 min  PT Therapeutic Procedures Time Entry  Manual Therapy Time Entry: 8  Therapeutic Exercise Time Entry: 35                 General:  General  Reason for Referral: L RTC Repair  Referred By: Dr. Hill  General Comment: Visit 6/8  (9/9) (9/9)  Visit# 24    Assessment:   Able to progress to more active strengthening.   Continue to work on exercises to target scap stability with ADL's.     Objective:   Palpable tightness along the L UT/rhomboids with palpation.     Plan:  Plan to progress strength and ROM as protocol allows. JA     OP PT Plan  Treatment/Interventions: Cryotherapy, Education/ Instruction, Manual therapy, Therapeutic exercises, Therapeutic activities  PT Plan: Skilled PT  PT Frequency:  (2x/wk for 4 weeks or 9 sessions)  Rehab Potential: Good  Plan of Care Agreement: Patient (Patient Goal:  Improve strength, ROM and use of L shoulder)    Current Problem  Problem List Items Addressed This Visit           ICD-10-CM    S/P left rotator cuff repair Z98.890    Chronic left shoulder pain M25.512, G89.29       Subjective Patient identified by name and date of birth. Denies any recent falls.  Patient is compliant with HEP completion with no issues to report at this time.  States that she was able to sleep for 8 hours in bed the night before last. She woke up at 4 am last night and had to go to the recliner.     Precautions  Precautions  Precautions Comment: Lower Fall Risk.  PMH: HTN, R trigger finger.   S/P L RTC Repair on 1/23/2025.  See Protocol    Pain  Pain Assessment: 0-10  0-10 (Numeric) Pain Score:  (1/2)  Pain Location: Shoulder  Pain Orientation: Left    Treatments:  12 weeks /6 days (4/23/25)     There EX: 35'  UBE  fwd 3'   Wall slide flexion 2  x 10  (scaption)   Wall flexion with slow eccentric lowering x 10   Wall  "protraction 2 x 10   Active shoulder anterior delt raise to 90* 2 x 10 (P 1#)  Active lateral delt raise no weight neutral position x 10 (N)   Resisted ROM gentle Mint band 2 x 10   Resisted ext gentle Mint band 2 x 10   Resisted ER 2  x 10  (Lime green)   Resisted IR 2 x 10 (Lime green)  Seated ER with wand 2 x 10 (X)   Supine H ABD 2 x 10 Lime (P resistance)   Supine cane flexion x10  (P to active no weight)   Supine protraction  2 x 10 (1#) (P to active) (I)   Supine chest press 2 x 10 (1/2#) (X)   Wall push up with a plus x 10   Bent over row 3# 2 x10 (P wt)  Foam roll up wall x10 (Used ball with single arm instead (N)         Not completed:   Pulley Flexion 3' (X)   Finger ladder to #20 x 5 (X)   Shoulder scap retractions x 20 (X)   Shoulder rolls bwd/fwd x 20 (X)   Seated flexion with wand to tolerance per protocol  2 x 10  (P 1/2#) (X)   Shoulder sub-max iso at the wall flex/ext/abd 10 x 5\"  (x )   Shoulder bicep 3 way 2 x 15  (2# wt)   (x not today)   Shoulder AAROM with SB on plinth (X)   -Flex/Ext  - HADD/HABD  - CWW/CW     Manual: 8'   Shoulder PROM all planes     OP EDUCATION:   Access Code: [email or web address]  URL: [email or web address]  Date: 04/23/2025  Prepared by: Rosa M Mcneal     Exercises  - Supine Shoulder Flexion Extension Full Range AROM  - 1 x daily - 7 x weekly - 2 sets - 10 reps  - Supine Shoulder Horizontal Abduction with Resistance  - 1 x daily - 7 x weekly - 2 sets - 10 reps  - Seated Shoulder Flexion with Dumbbells  - 1 x daily - 7 x weekly - 2 sets - 10 reps       Goals:  Active       PT Problem       PT Goal 1       Start:  01/29/25    Expected End:  04/25/25       LTG's:  1) Improve L  shoulder strength to >= 5-/5 throughout as her protocol allows in order to facilitate ability to reach overhead and lift objects.      4-6 weeks      2) Improve  L shoulder PROM  to >= 160 deg flex/abd and 80 deg ER/IR as her protocol allows in order to better reach overhead or behind " back.       4-6 weeks      3) Improve  L shoulder pain from   5/10 to <= 0-3/10 with activity in order to improve QOL.        4-6 weeks      4) Improve quick dash score by >= 5 points in order to improve QOL.    4-6 weeks      5) The pt will improve ability to raise arm/shoulder overhead, reach at various angles, lift/carry objects, dress upper body, overhead activity and return to exercise and work around the home and/or on the job as her protocol allows without significant limitation.    4-6 weeks        ST) Pt/caregiver will be I and consistent with HEP with use of handout as needed in order to maximize shoulder strength and ROM/flexibility.       2-3 weeks

## 2025-04-28 ENCOUNTER — APPOINTMENT (OUTPATIENT)
Dept: PHYSICAL THERAPY | Facility: CLINIC | Age: 75
End: 2025-04-28
Payer: MEDICARE

## 2025-04-28 ENCOUNTER — DOCUMENTATION (OUTPATIENT)
Dept: PHYSICAL THERAPY | Facility: HOSPITAL | Age: 75
End: 2025-04-28
Payer: MEDICARE

## 2025-04-28 NOTE — PROGRESS NOTES
Physical Therapy                 Therapy Communication Note    Patient Name: Shona Hansen  MRN: [id number]    Today's Date: 4/28/2025     Discipline: Physical Therapy          The physical therapist of record is the therapist who assumes primary responsibility for patient management and as such is held accountable for the coordination, continuation, and progression of the POC.  This patient's care and PT of record will be transferred from Scar Marroquin PT to Noah DysonPT effective as of  4/28/25.

## 2025-04-30 ENCOUNTER — TREATMENT (OUTPATIENT)
Dept: PHYSICAL THERAPY | Facility: CLINIC | Age: 75
End: 2025-04-30
Payer: MEDICARE

## 2025-04-30 DIAGNOSIS — Z98.890 S/P LEFT ROTATOR CUFF REPAIR: ICD-10-CM

## 2025-04-30 DIAGNOSIS — G89.29 CHRONIC LEFT SHOULDER PAIN: ICD-10-CM

## 2025-04-30 DIAGNOSIS — M25.512 CHRONIC LEFT SHOULDER PAIN: ICD-10-CM

## 2025-04-30 PROCEDURE — 97110 THERAPEUTIC EXERCISES: CPT | Mod: GP,CQ

## 2025-04-30 PROCEDURE — 97140 MANUAL THERAPY 1/> REGIONS: CPT | Mod: GP,CQ

## 2025-04-30 ASSESSMENT — PAIN - FUNCTIONAL ASSESSMENT: PAIN_FUNCTIONAL_ASSESSMENT: 0-10

## 2025-04-30 ASSESSMENT — PAIN SCALES - GENERAL: PAINLEVEL_OUTOF10: 1

## 2025-04-30 NOTE — PROGRESS NOTES
Physical Therapy Treatment    Patient Name: Shona Hansen  MRN: [id number]  Today's Date: 4/30/2025  Time Calculation  Start Time: 0915  Stop Time: 1000  Time Calculation (min): 45 min  PT Therapeutic Procedures Time Entry  Manual Therapy Time Entry: 10  Therapeutic Exercise Time Entry: 33                 General:  General  Reason for Referral: L RTC Repair  Referred By: Dr. Hill  General Comment: Visit 7/8  (9/9) (9/9)  Visit# 25    Assessment:   Added tricep extension resisted.   Had patient squeeze shoulder blades for improved scap stability with resisted IR to see if this decreased crepitus, which it did not.        Objective:   Crepitus palpated with resisted IR.   Completed mild shoulder distraction with passive flexion today with decreased crepitus during and improved comfort.    Plan:  Plan to progress strength and ROM as able per protocol. JA     OP PT Plan  Treatment/Interventions: Cryotherapy, Education/ Instruction, Manual therapy, Therapeutic exercises, Therapeutic activities  PT Plan: Skilled PT  PT Frequency:  (2x/wk for 4 weeks or 9 sessions)  Rehab Potential: Good  Plan of Care Agreement: Patient (Patient Goal:  Improve strength, ROM and use of L shoulder)    Current Problem  Problem List Items Addressed This Visit           ICD-10-CM    S/P left rotator cuff repair Z98.890    Chronic left shoulder pain M25.512, G89.29       Subjective Patient identified by name and date of birth. Denies any recent falls.  Patient is compliant with HEP completion with no issues to report at this time.   States that the shoulder is feeling stiff today.       Precautions  Precautions  Precautions Comment: Lower Fall Risk.  PMH: HTN, R trigger finger.   S/P L RTC Repair on 1/23/2025.  See Protocol    Pain  Pain Assessment: 0-10  0-10 (Numeric) Pain Score: 1  Pain Location: Shoulder  Pain Orientation: Left    Treatments:  12 weeks /6 days (4/23/25)     There EX: 35'  UBE  fwd 3'   Wall slide flexion 2  x 10  (scaption)  "  Wall flexion with slow eccentric lowering x 10   Wall protraction 2 x 15 (P reps)   Wall clocks resisted [place] x 5 ea dir (N)   Active shoulder anterior delt raise to 90* 2 x 10 (P 1#)  Active lateral delt raise no weight neutral position x 10   Resisted ROM gentle Mint band 2 x 10   Resisted ext gentle Mint band 2 x 10   Resisted ER 2  x 10  (Lime green)   Resisted IR 2 x 10 (Lime green)  Resisted Tricep ext 2 x 10 (Lime Green) (N) (I)   Seated ER with wand 2 x 10 (X)   Supine H ABD 2 x 10 Lime (P resistance)   Supine cane flexion x10  (P to active no weight)   Supine protraction  2 x 10 (2#)   Supine chest press 2 x 10 (1/2#) (X)   Wall push up with a plus x 10   Bent over row 3# 2 x10   Foam roll up wall x10 (Used ball with single arm instead (N)         Not completed:   Pulley Flexion 3' (X)   Finger ladder to #20 x 5 (X)   Shoulder scap retractions x 20 (X)   Shoulder rolls bwd/fwd x 20 (X)   Seated flexion with wand to tolerance per protocol  2 x 10  (P 1/2#) (X)   Shoulder sub-max iso at the wall flex/ext/abd 10 x 5\"  (x )   Shoulder bicep 3 way 2 x 15  (2# wt)   (x not today)   Shoulder AAROM with SB on plinth (X)   -Flex/Ext  - HADD/HABD  - CWW/CW     Manual: 8'   Shoulder PROM all planes     OP EDUCATION:   Access Code: [email or web address]  URL: [email or web address]  Date: 04/23/2025  Prepared by: Rosa M Mcneal     Exercises  - Supine Shoulder Flexion Extension Full Range AROM  - 1 x daily - 7 x weekly - 2 sets - 10 reps  - Supine Shoulder Horizontal Abduction with Resistance  - 1 x daily - 7 x weekly - 2 sets - 10 reps  - Seated Shoulder Flexion with Dumbbells  - 1 x daily - 7 x weekly - 2 sets - 10 reps    Goals:  Active       PT Problem       PT Goal 1       Start:  01/29/25    Expected End:  04/25/25       LTG's:  1) Improve L  shoulder strength to >= 5-/5 throughout as her protocol allows in order to facilitate ability to reach overhead and lift objects.      4-6 weeks      2) Improve  " L shoulder PROM  to >= 160 deg flex/abd and 80 deg ER/IR as her protocol allows in order to better reach overhead or behind back.       4-6 weeks      3) Improve  L shoulder pain from   5/10 to <= 0-3/10 with activity in order to improve QOL.        4-6 weeks      4) Improve quick dash score by >= 5 points in order to improve QOL.    4-6 weeks      5) The pt will improve ability to raise arm/shoulder overhead, reach at various angles, lift/carry objects, dress upper body, overhead activity and return to exercise and work around the home and/or on the job as her protocol allows without significant limitation.    4-6 weeks        ST) Pt/caregiver will be I and consistent with HEP with use of handout as needed in order to maximize shoulder strength and ROM/flexibility.       2-3 weeks

## 2025-05-05 ENCOUNTER — TREATMENT (OUTPATIENT)
Dept: PHYSICAL THERAPY | Facility: CLINIC | Age: 75
End: 2025-05-05
Payer: MEDICARE

## 2025-05-05 DIAGNOSIS — M25.512 CHRONIC LEFT SHOULDER PAIN: ICD-10-CM

## 2025-05-05 DIAGNOSIS — G89.29 CHRONIC LEFT SHOULDER PAIN: ICD-10-CM

## 2025-05-05 DIAGNOSIS — Z98.890 S/P LEFT ROTATOR CUFF REPAIR: Primary | ICD-10-CM

## 2025-05-05 PROCEDURE — 97110 THERAPEUTIC EXERCISES: CPT | Mod: GP

## 2025-05-05 ASSESSMENT — PAIN - FUNCTIONAL ASSESSMENT: PAIN_FUNCTIONAL_ASSESSMENT: 0-10

## 2025-05-05 ASSESSMENT — PAIN SCALES - GENERAL: PAINLEVEL_OUTOF10: 0 - NO PAIN

## 2025-05-05 NOTE — LETTER
May 5, 2025    Alok Hill MD  [address] [facility] [place] [address]    Patient: Shona Hansen   YOB: 1950   Date of Visit: 2025       Dear No referring provider defined for this encounter.    The attached plan of care is being sent to you because your patient’s medical reimbursement requires that you certify the plan of care. Your signature is required to allow uninterrupted insurance coverage.      You may indicate your approval by signing below and faxing this form back to us at Dept Fax: [phone].    Please call Dept: [phone] with any questions or concerns.    Thank you for this referral,        Noah Dyson, PT  [facility] [address]  [address] [address]    Payer: Payor: MEDICARE / Plan: MEDICARE PART A AND B / Product Type: *No Product type* /                                                                         Date:     Dear Noah Dyson, PT,     Re: Ms. Shona Hansen, MRN:[id number]    I certify that I have reviewed the attached plan of care and it is medically necessary for Ms. Shona Hansen (1950) who is under my care.          ______________________________________                    _________________  Provider name and credentials                                           Date and time                                                                                           Plan of Care 25   Effective from: 2025  Effective to: 2025    Plan ID: [id number]            Participants as of Finalize on 2025    Name Type Comments Contact Info    Alok Hill MD Consulting Physician  [phone]    Noah Dyson, PT Physical Therapist  [phone]       Last Plan Note     Author: Noah Dyson PT Status: Incomplete Last edited: 2025  9:30 AM       Physical Therapy          Physical Therapy Treatment          Patient Name: Shona Hansen     MRN: [id number]     : 1950      * No  "referring provider recorded for this case *     Today's Date: 2025     Time Calculation  Start Time: 930  Stop Time: 1010  Time Calculation (min): 40 min     PT Therapeutic Procedures Time Entry  Therapeutic Exercise Time Entry: 40                       General     Reason for Referral: L RTC Repair  Referred By: Dr. Hill  General Comment: Visit   () ()     Visit #26           Current Problem     Problem List Items Addressed This Visit           ICD-10-CM       Musculoskeletal and Injuries    S/P left rotator cuff repair - Primary Z98.890    Relevant Orders    Follow Up In Physical Therapy    Chronic left shoulder pain M25.512, G89.29    Relevant Orders    Follow Up In Physical Therapy                        Subjective     Pt reports that she has been feeling good, though reports \"cracking\" at times. Pt reports that she has an appointment c the surgeon today.       Pt.'s name and  were confirmed this date.          Pt. Reports compliance with HEP.          Precautions     Precautions  Precautions Comment: Lower Fall Risk.  PMH: HTN, R trigger finger.   S/P L RTC Repair on 2025.  See Protocol          Pain     Pain Assessment: 0-10  0-10 (Numeric) Pain Score: 0 - No pain          Objective              Upper Extremity Strength:??   MMT 5/5 max???  RIGHT??  LEFT??    Shldr Abduction??  ??  ?? 4/5   Shldr ER??  ??  ?? 4/5   Shldr IR??  ??  ?? 4/5   Shldr Flexion??  ???  ??? 4/5   Elbow Flexion??  ???  ???    ?Elbow Ext??  ???  ???    ???  ???  ???    ??   Upper Extremity ROM:??   ??  RIGHT??  LEFT??    Shldr Abduction??  °??  ?? 121° (AAROM 139°)   Shldr ER??  ??  ??    Shldr IR??  ??  ??    Shldr Flexion??  ???  ??? 135° (AAROM 158°)   Elbow Flexion??  ???  ???    ?Elbow Ext??  ???  ???    ???  ???  ???           Pt's name and  were confirmed today.          Outcome Measures:     Other Measures  Disability of Arm Shoulder Hand (DASH): 28/55 (38.64/100)          Treatments:   DINORAE  karena 3' " "  AAROM shoulder flexion x10 c 5\" stretch  Wall slide flexion 2  x 10  (scaption)   Wall flexion with slow eccentric lowering x 10   Wall clocks resisted [place] x 5 ea dir (N)   Resisted ext gentle Mint band 2 x 10   Resisted ER 2  x 10  (Lime green)   Resisted IR 2 x 10 (Lime green)  Supine cane flexion x10  Wall push up with a plus x 10   Foam roll up wall x10 (Used ball with single arm instead (N)   Purple tube ext. 3x15  Purple tube row 2x15      Patient Education:   Pt educated on diagnosis, POC, HEP, anatomy, body mechanics, posture, pain modulation, progression, c good understanding demonstrated.??       Assessment:   Pt has made consistent progress throughout episode, including c decreased pain, increased AROM, increased strength, improved QuickDASH to 28/55 from 49/55. Pt has a follow-up c surgeon and could benefit from continued PT services to increase AROM, strength, and functional ability.             Plan:     OP PT Plan  Treatment/Interventions: Cryotherapy, Education/ Instruction, Manual therapy, Therapeutic exercises, Therapeutic activities  PT Plan: Skilled PT  PT Frequency:  (2x/wk for 4 weeks or 9 sessions)  Rehab Potential: Good  Plan of Care Agreement: Patient (Patient Goal:  Improve strength, ROM and use of L shoulder)          Goals:     Active       PT Problem       PT Goal 1       Start:  01/29/25    Expected End:  04/25/25       LTG's:  1) Improve L  shoulder strength to >= 5-/5 throughout as her protocol allows in order to facilitate ability to reach overhead and lift objects.      4-6 weeks      2) Improve  L shoulder PROM  to >= 160 deg flex/abd and 80 deg ER/IR as her protocol allows in order to better reach overhead or behind back.       4-6 weeks      3) Improve  L shoulder pain from   5/10 to <= 0-3/10 with activity in order to improve QOL.        4-6 weeks      4) Improve quick dash score by >= 5 points in order to improve QOL.    4-6 weeks      5) The pt will improve ability to " raise arm/shoulder overhead, reach at various angles, lift/carry objects, dress upper body, overhead activity and return to exercise and work around the home and/or on the job as her protocol allows without significant limitation.    4-6 weeks        ST) Pt/caregiver will be I and consistent with HEP with use of handout as needed in order to maximize shoulder strength and ROM/flexibility.       2-3 weeks                      Current Participants as of 2025    Name Type Comments Contact Info    Alok Hill MD Consulting Physician  [phone]    Signature pending    Noah Dyson PT Physical Therapist  [phone]

## 2025-05-05 NOTE — PROGRESS NOTES
"Physical Therapy          Physical Therapy Treatment          Patient Name: Shona Hansen     MRN: [id number]     : 1950      * No referring provider recorded for this case *     Today's Date: 2025     Time Calculation  Start Time: 930  Stop Time:   Time Calculation (min): 40 min     PT Therapeutic Procedures Time Entry  Therapeutic Exercise Time Entry: 40                       General     Reason for Referral: L RTC Repair  Referred By: Dr. Hill  General Comment: Visit   () ()     Visit #26           Current Problem     Problem List Items Addressed This Visit           ICD-10-CM       Musculoskeletal and Injuries    S/P left rotator cuff repair - Primary Z98.890    Relevant Orders    Follow Up In Physical Therapy    Chronic left shoulder pain M25.512, G89.29    Relevant Orders    Follow Up In Physical Therapy                        Subjective      Pt reports that she has been feeling good, though reports \"cracking\" at times. Pt reports that she has an appointment c the surgeon today.       Pt.'s name and  were confirmed this date.          Pt. Reports compliance with HEP.          Precautions     Precautions  Precautions Comment: Lower Fall Risk.  PMH: HTN, R trigger finger.   S/P L RTC Repair on 2025.  See Protocol          Pain     Pain Assessment: 0-10  0-10 (Numeric) Pain Score: 0 - No pain          Objective               Upper Extremity Strength:??   MMT 5/5 max???  RIGHT??  LEFT??    Shldr Abduction??  ??  ?? 4/5   Shldr ER??  ??  ?? 4/5   Shldr IR??  ??  ?? 4/5   Shldr Flexion??  ???  ??? 4/5   Elbow Flexion??  ???  ???    ?Elbow Ext??  ???  ???    ???  ???  ???    ??   Upper Extremity ROM:??   ??  RIGHT??  LEFT??    Shldr Abduction??  °??  ?? 121° (AAROM 139°)   Shldr ER??  ??  ??    Shldr IR??  ??  ??    Shldr Flexion??  ???  ??? 135° (AAROM 158°)   Elbow Flexion??  ???  ???    ?Elbow Ext??  ???  ???    ???  ???  ???           Pt's name and  were confirmed today.      " "    Outcome Measures:     Other Measures  Disability of Arm Shoulder Hand (DASH): 28/55 (38.64/100)          Treatments:   UBE  fwd 3'   AAROM shoulder flexion x10 c 5\" stretch  Wall slide flexion 2  x 10  (scaption)   Wall flexion with slow eccentric lowering x 10   Wall clocks resisted [place] x 5 ea dir (N)   Resisted ext gentle Mint band 2 x 10   Resisted ER 2  x 10  (Lime green)   Resisted IR 2 x 10 (Lime green)  Supine cane flexion x10  Wall push up with a plus x 10   Foam roll up wall x10 (Used ball with single arm instead (N)   Purple tube ext. 3x15  Purple tube row 2x15      Patient Education:   Pt educated on diagnosis, POC, HEP, anatomy, body mechanics, posture, pain modulation, progression, c good understanding demonstrated.??       Assessment:   Pt has made consistent progress throughout episode, including c decreased pain, increased AROM, increased strength, improved QuickDASH to 28/55 from 49/55. Pt has a follow-up c surgeon and could benefit from continued PT services to increase AROM, strength, and functional ability.             Plan:     OP PT Plan  Treatment/Interventions: Cryotherapy, Education/ Instruction, Manual therapy, Therapeutic exercises, Therapeutic activities  PT Plan: Skilled PT  PT Frequency: 2 times per week (1-2x/week)  Duration: 4 weeks  Certification Period Start Date: 05/05/25  Rehab Potential: Good  Plan of Care Agreement: Patient (Patient Goal:  Improve strength, ROM and use of L shoulder)          Goals:     Active       PT Problem       PT Goal 1       Start:  01/29/25    Expected End:  04/25/25       LTG's:  1) Improve L  shoulder strength to >= 5-/5 throughout as her protocol allows in order to facilitate ability to reach overhead and lift objects.      4-6 weeks      2) Improve  L shoulder PROM  to >= 160 deg flex/abd and 80 deg ER/IR as her protocol allows in order to better reach overhead or behind back.       4-6 weeks      3) Improve  L shoulder pain from   5/10 to <= " 0-3/10 with activity in order to improve QOL.        4-6 weeks      4) Improve quick dash score by >= 5 points in order to improve QOL.    4-6 weeks      5) The pt will improve ability to raise arm/shoulder overhead, reach at various angles, lift/carry objects, dress upper body, overhead activity and return to exercise and work around the home and/or on the job as her protocol allows without significant limitation.    4-6 weeks        ST) Pt/caregiver will be I and consistent with HEP with use of handout as needed in order to maximize shoulder strength and ROM/flexibility.       2-3 weeks

## 2025-05-09 ENCOUNTER — TREATMENT (OUTPATIENT)
Dept: PHYSICAL THERAPY | Facility: CLINIC | Age: 75
End: 2025-05-09
Payer: MEDICARE

## 2025-05-09 DIAGNOSIS — Z98.890 S/P LEFT ROTATOR CUFF REPAIR: Primary | ICD-10-CM

## 2025-05-09 DIAGNOSIS — G89.29 CHRONIC LEFT SHOULDER PAIN: ICD-10-CM

## 2025-05-09 DIAGNOSIS — M25.512 CHRONIC LEFT SHOULDER PAIN: ICD-10-CM

## 2025-05-09 PROCEDURE — 97110 THERAPEUTIC EXERCISES: CPT | Mod: GP,CQ

## 2025-05-09 PROCEDURE — 97140 MANUAL THERAPY 1/> REGIONS: CPT | Mod: GP,CQ

## 2025-05-09 ASSESSMENT — PAIN SCALES - GENERAL: PAINLEVEL_OUTOF10: 0 - NO PAIN

## 2025-05-09 ASSESSMENT — PAIN - FUNCTIONAL ASSESSMENT: PAIN_FUNCTIONAL_ASSESSMENT: 0-10

## 2025-05-09 NOTE — PROGRESS NOTES
Physical Therapy Treatment    Patient Name: Shona Hansen  MRN: [id number]  Today's Date: 5/9/2025  Time Calculation  Start Time: 0915  Stop Time: 0958  Time Calculation (min): 43 min  PT Therapeutic Procedures Time Entry  Manual Therapy Time Entry: 8  Therapeutic Exercise Time Entry: 31         Current Problem  Problem List Items Addressed This Visit           ICD-10-CM    S/P left rotator cuff repair - Primary Z98.890    Chronic left shoulder pain M25.512, G89.29       Subjective   Pt.'s name and birthday confirmed.  Pt. Is compliant with HEP.  Pt. States she has no c/o sx.'s at this time.  Pt. Saw MD this past Monday.  Pt. Still has a 3-4#'s lifting restriction.  Pt. To continue with plan of care per MD.    Reason for Referral: L RTC Repair  Referred By: Dr. Hill  General Comment: Visit  1/8 8/8  (9/9) (9/9)      Precautions  Precautions  Precautions Comment: Lower Fall Risk.  PMH: HTN, R trigger finger.   S/P L RTC Repair on 1/23/2025.  See Protocol      Pain  Pain Assessment: 0-10  0-10 (Numeric) Pain Score: 0 - No pain  Pain Location: Shoulder  Pain Orientation: Left    Objective:    Treatments:  UBE  fwd 3'   Wall slide flexion 2  x 10  (scaption)   Wall flexion with slow eccentric lowering x 10   Wall protraction 2 x 15 (X)  Wall clocks resisted [place] x 5 ea dir   Active shoulder anterior delt raise to 90* 2 x 10 (P 1#)  Active lateral delt raise no weight neutral position x 10 (P 1#)  Resisted ROM gentle Mint band 2 x 10   Resisted ext gentle Mint band 2 x 10   Resisted ER 2  x 10  (Lime green)   Resisted IR 2 x 10 (Lime green)  Resisted Tricep ext 2 x 10 (Lime Green) (X)  Seated ER with wand 2 x 10 (X)   Supine H ABD 2 x 10 Lime (P resistance)   Supine cane flexion x10  (P to active no weight)   Supine protraction  2 x 10 (2#)   Supine chest press 2 x 10 (1/2#) (X)   Wall push up with a plus x 10   Bent over row 3# 2 x10   Foam roll up wall x10 (Used ball with single arm instead (N)         Not completed:  "  Pulley Flexion 3' (X)   Finger ladder to #20 x 5 (X)   Shoulder scap retractions x 20 (X)   Shoulder rolls bwd/fwd x 20 (X)   Seated flexion with wand to tolerance per protocol  2 x 10  (P 1/2#) (X)   Shoulder sub-max iso at the wall flex/ext/abd 10 x 5\"  (x )   Shoulder bicep 3 way 2 x 15  (2# wt)   (x not today)   Shoulder AAROM with SB on plinth (X)   -Flex/Ext  - HADD/HABD  - CWW/CW     Manual: 8'   Shoulder PROM all planes            OP EDUCATION:  Access Code: [email or web address]  URL: [email or web address]  Date: 04/23/2025  Prepared by: Rosa M Mcneal     Exercises  - Supine Shoulder Flexion Extension Full Range AROM  - 1 x daily - 7 x weekly - 2 sets - 10 reps  - Supine Shoulder Horizontal Abduction with Resistance  - 1 x daily - 7 x weekly - 2 sets - 10 reps  - Seated Shoulder Flexion with Dumbbells  - 1 x daily - 7 x weekly - 2 sets - 10 reps           Assessment:  Shoulder popping noted with PROM flexion.  Verbal cues with TE with no c/o increased pain.  Pt. Challenged with session this date.         Plan:  Continue with strengthening, ROM and flexibility per tolerance to improve daily activities with little to no difficulty.  MB       OP PT Plan  Treatment/Interventions: Cryotherapy, Education/ Instruction, Manual therapy, Therapeutic exercises, Therapeutic activities  PT Plan: Skilled PT  PT Frequency: 2 times per week (1-2x/week)  Duration: 4 weeks  Certification Period Start Date: 05/05/25  Rehab Potential: Good  Plan of Care Agreement: Patient (Patient Goal:  Improve strength, ROM and use of L shoulder)              Goals:  Active       PT Problem       PT Goal 1 (Progressing)       Start:  01/29/25    Expected End:  04/25/25       LTG's:  1) Improve L  shoulder strength to >= 5-/5 throughout as her protocol allows in order to facilitate ability to reach overhead and lift objects.      4-6 weeks      2) Improve  L shoulder PROM  to >= 160 deg flex/abd and 80 deg ER/IR as her protocol " allows in order to better reach overhead or behind back.       4-6 weeks      3) Improve  L shoulder pain from   5/10 to <= 0-3/10 with activity in order to improve QOL.        4-6 weeks      4) Improve quick dash score by >= 5 points in order to improve QOL.    4-6 weeks      5) The pt will improve ability to raise arm/shoulder overhead, reach at various angles, lift/carry objects, dress upper body, overhead activity and return to exercise and work around the home and/or on the job as her protocol allows without significant limitation.    4-6 weeks        ST) Pt/caregiver will be I and consistent with HEP with use of handout as needed in order to maximize shoulder strength and ROM/flexibility.       2-3 weeks

## 2025-05-19 ENCOUNTER — TREATMENT (OUTPATIENT)
Dept: PHYSICAL THERAPY | Facility: CLINIC | Age: 75
End: 2025-05-19
Payer: MEDICARE

## 2025-05-19 DIAGNOSIS — G89.29 CHRONIC LEFT SHOULDER PAIN: ICD-10-CM

## 2025-05-19 DIAGNOSIS — M25.512 CHRONIC LEFT SHOULDER PAIN: ICD-10-CM

## 2025-05-19 DIAGNOSIS — Z98.890 S/P LEFT ROTATOR CUFF REPAIR: ICD-10-CM

## 2025-05-19 PROCEDURE — 97140 MANUAL THERAPY 1/> REGIONS: CPT | Mod: GP,CQ

## 2025-05-19 PROCEDURE — 97110 THERAPEUTIC EXERCISES: CPT | Mod: GP,CQ

## 2025-05-19 ASSESSMENT — PAIN - FUNCTIONAL ASSESSMENT: PAIN_FUNCTIONAL_ASSESSMENT: 0-10

## 2025-05-19 ASSESSMENT — PAIN SCALES - GENERAL: PAINLEVEL_OUTOF10: 1

## 2025-05-19 NOTE — PROGRESS NOTES
Physical Therapy Treatment    Patient Name: Shona Hansen  MRN: [id number]  Today's Date: 5/19/2025  Time Calculation  Start Time: 1449  Stop Time: 1530  Time Calculation (min): 41 min  PT Therapeutic Procedures Time Entry  Manual Therapy Time Entry: 10  Therapeutic Exercise Time Entry: 31                 General:  General  Reason for Referral: L RTC Repair  Referred By: Dr. Hill  General Comment: Visit  2/8 (8/8)  (9/9) (9/9)  Visit# 28      Assessment:   PT made aware of the scheduling issue with the upcoming surgery and is ok with rescheduling those visits.   Patient has pain in the bicep with active and resisted movements at times due to the lipoma. Strengthening is completed as patient is able.     Objective:   AROM WFL with mild end range GH elevation to compensate.       Plan:  Plan to continue with progression of PRE's and ROM for improved ability to complete ADL's. JA     OP PT Plan  Treatment/Interventions: Cryotherapy, Education/ Instruction, Manual therapy, Therapeutic exercises, Therapeutic activities  PT Plan: Skilled PT  PT Frequency: 2 times per week (1-2x/week)  Duration: 4 weeks  Certification Period Start Date: 05/05/25  Rehab Potential: Good  Plan of Care Agreement: Patient (Patient Goal:  Improve strength, ROM and use of L shoulder)    Current Problem  Problem List Items Addressed This Visit           ICD-10-CM    S/P left rotator cuff repair Z98.890    Chronic left shoulder pain M25.512, G89.29       Subjective Patient identified by name and date of birth. Denies any recent falls.  Patient is compliant with HEP completion with no issues to report at this time.  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 Has to take that week off of PT due to restrictions.     Precautions  Precautions  Precautions Comment: Lower Fall Risk.  PMH: HTN, R trigger finger.   S/P L RTC Repair on 1/23/2025.  See Protocol    Pain  Pain Assessment: 0-10  0-10 (Numeric) Pain Score: 1  Pain  "Location: Shoulder  Pain Orientation: Left    Treatments:  UBE  fwd 3'   Wall slide flexion 2  x 10  (scaption)   Wall flexion with slow eccentric lowering x 10   Wall protraction 2 x 15 (X)  Wall clocks resisted Orange x 5 ea dir   Active shoulder anterior delt raise to 90* 2 x 10 (1#)  Active lateral delt raise no weight neutral position x 10 (1#)  Resisted Rows gentle Mint band 2 x 10 (X not today d/t bicep pain)  Resisted ext gentle Mint band 2 x 10   Resisted ER 2  x 10  (Lime green)   Resisted IR 2 x 10 (Lime green)  Resisted Tricep ext 2 x 10 (Lime Green)   Seated ER with wand 2 x 10   Supine H ABD 2 x 10 Lime (P resistance)   Supine cane flexion x10  (P to active no weight)   Supine protraction  2 x 10 (2#)   Supine chest press 2 x 10 (1/2#) (X)   Wall push up with a plus x 10   Bent over row 3# 2 x10   Foam roll up wall x10 (Used ball with single arm instead (N)         Not completed:   Pulley Flexion 3' (X)   Finger ladder to #20 x 5 (X)   Shoulder scap retractions x 20 (X)   Shoulder rolls bwd/fwd x 20 (X)   Seated flexion with wand to tolerance per protocol  2 x 10  (P 1/2#) (X)   Shoulder sub-max iso at the wall flex/ext/abd 10 x 5\"  (x )   Shoulder bicep 3 way 2 x 15  (2# wt)   (x not today)   Shoulder AAROM with SB on plinth (X)   -Flex/Ext  - HADD/HABD  - CWW/CW     Manual: 8'   Shoulder PROM all planes      OP EDUCATION:   Access Code: [email or web address]  URL: [email or web address]  Date: 04/23/2025  Prepared by: Rosa M Mcneal     Exercises  - Supine Shoulder Flexion Extension Full Range AROM  - 1 x daily - 7 x weekly - 2 sets - 10 reps  - Supine Shoulder Horizontal Abduction with Resistance  - 1 x daily - 7 x weekly - 2 sets - 10 reps  - Seated Shoulder Flexion with Dumbbells  - 1 x daily - 7 x weekly - 2 sets - 10 reps              Goals:  Active       PT Problem       PT Goal 1 (Progressing)       Start:  01/29/25    Expected End:  04/25/25       LTG's:  1) Improve L  shoulder " strength to >= 5-/5 throughout as her protocol allows in order to facilitate ability to reach overhead and lift objects.      4-6 weeks      2) Improve  L shoulder PROM  to >= 160 deg flex/abd and 80 deg ER/IR as her protocol allows in order to better reach overhead or behind back.       4-6 weeks      3) Improve  L shoulder pain from   5/10 to <= 0-3/10 with activity in order to improve QOL.        4-6 weeks      4) Improve quick dash score by >= 5 points in order to improve QOL.    4-6 weeks      5) The pt will improve ability to raise arm/shoulder overhead, reach at various angles, lift/carry objects, dress upper body, overhead activity and return to exercise and work around the home and/or on the job as her protocol allows without significant limitation.    4-6 weeks        ST) Pt/caregiver will be I and consistent with HEP with use of handout as needed in order to maximize shoulder strength and ROM/flexibility.       2-3 weeks

## 2025-05-21 ENCOUNTER — TREATMENT (OUTPATIENT)
Dept: PHYSICAL THERAPY | Facility: CLINIC | Age: 75
End: 2025-05-21
Payer: MEDICARE

## 2025-05-21 DIAGNOSIS — M25.512 CHRONIC LEFT SHOULDER PAIN: ICD-10-CM

## 2025-05-21 DIAGNOSIS — Z98.890 S/P LEFT ROTATOR CUFF REPAIR: ICD-10-CM

## 2025-05-21 DIAGNOSIS — G89.29 CHRONIC LEFT SHOULDER PAIN: ICD-10-CM

## 2025-05-21 PROCEDURE — 97140 MANUAL THERAPY 1/> REGIONS: CPT | Mod: GP,CQ

## 2025-05-21 PROCEDURE — 97110 THERAPEUTIC EXERCISES: CPT | Mod: GP,CQ

## 2025-05-21 ASSESSMENT — PAIN SCALES - GENERAL: PAINLEVEL_OUTOF10: 1

## 2025-05-21 ASSESSMENT — PAIN - FUNCTIONAL ASSESSMENT: PAIN_FUNCTIONAL_ASSESSMENT: 0-10

## 2025-05-21 NOTE — PROGRESS NOTES
Physical Therapy Treatment    Patient Name: Shona Hansen  MRN: [id number]  Today's Date: 5/21/2025  Time Calculation  Start Time: 1130  Stop Time: 1212  Time Calculation (min): 42 min  PT Therapeutic Procedures Time Entry  Manual Therapy Time Entry: 10  Therapeutic Exercise Time Entry: 31                 General:  General  Reason for Referral: L RTC Repair  Referred By: Dr. Hill  General Comment: Visit  3/8 (8/8)  (9/9) (9/9)  Visit# 29  Assessment:   Swelling in the L upper arm.  Pain with shoulder ABD in area of bicep.   Tenderness along proximal bicep tendon.      Objective:   Mild crepitus with passive flexion and active shoulder extension.     Plan:  Plan to progress strength as able with the upcoming surgery. JA     OP PT Plan  Treatment/Interventions: Cryotherapy, Education/ Instruction, Manual therapy, Therapeutic exercises, Therapeutic activities  PT Plan: Skilled PT  PT Frequency: 2 times per week (1-2x/week)  Duration: 4 weeks  Certification Period Start Date: 05/05/25  Rehab Potential: Good  Plan of Care Agreement: Patient (Patient Goal:  Improve strength, ROM and use of L shoulder)    Current Problem  Problem List Items Addressed This Visit           ICD-10-CM    S/P left rotator cuff repair Z98.890    Chronic left shoulder pain M25.512, G89.29       Subjective Patient identified by name and date of birth. Denies any recent falls.  Patient is compliant with HEP completion with no issues to report at this time.  States that she was able to plant flowers earlier today.   States that she feels she is limited and is looking forward to the upcoming surgery since she feels this is holding her back with gaining shoulder strength.    Precautions  Precautions  Precautions Comment: Lower Fall Risk.  PMH: HTN, R trigger finger.   S/P L RTC Repair on 1/23/2025.  See Protocol    Pain  Pain Assessment: 0-10  0-10 (Numeric) Pain Score: 1  Pain Location: Shoulder  Pain Orientation: Left    Treatments:  UBE  fwd 3'  "  Wall slide flexion 2  x 10  (scaption)   Wall flexion with slow eccentric lowering x 10   Wall protraction 2 x 15 (X)  Wall clocks resisted Orange x 5 ea dir   Active shoulder anterior delt raise to 90* 2 x 10 (1#)  Active lateral delt raise no weight neutral position x 10 (1#) (X not today)   Resisted Rows gentle Mint band 2 x 10 (X not today d/t bicep pain)  Resisted ext gentle Mint band 2 x 10   Resisted ER 2 x 10  (Lime green)   Resisted IR 2 x 10 (Lime green)  Resisted Tricep ext 2 x 10 (Lime Green)   Seated ER with wand 2 x 10   Supine H ABD 2 x 10 Lime (P resistance)   Supine cane flexion x10  (P to active no weight)   Supine protraction  2 x 10 (2#)   Supine chest press 2 x 10 (1/2#) (X)   Wall push up with a plus x 10   Bent over row 3# 2 x10   Foam roll up wall x10 (Used ball with single arm instead (N)         Not completed:   Pulley Flexion 3' (X)   Finger ladder to #20 x 5 (X)   Shoulder scap retractions x 20 (X)   Shoulder rolls bwd/fwd x 20 (X)   Seated flexion with wand to tolerance per protocol  2 x 10  (P 1/2#) (X)   Shoulder sub-max iso at the wall flex/ext/abd 10 x 5\"  (x )   Shoulder bicep 3 way 2 x 15  (2# wt)   (x not today)   Shoulder AAROM with SB on plinth (X)   -Flex/Ext  - HADD/HABD  - CWW/CW     Manual: 8'   Shoulder PROM all planes      OP EDUCATION:   Access Code: [email or web address]  URL: [email or web address]  Date: 04/23/2025  Prepared by: Rosa M Mcneal     Exercises  - Supine Shoulder Flexion Extension Full Range AROM  - 1 x daily - 7 x weekly - 2 sets - 10 reps  - Supine Shoulder Horizontal Abduction with Resistance  - 1 x daily - 7 x weekly - 2 sets - 10 reps  - Seated Shoulder Flexion with Dumbbells  - 1 x daily - 7 x weekly - 2 sets - 10 reps     Goals:  Active       PT Problem       PT Goal 1 (Progressing)       Start:  01/29/25    Expected End:  04/25/25       LTG's:  1) Improve L  shoulder strength to >= 5-/5 throughout as her protocol allows in order to " facilitate ability to reach overhead and lift objects.      4-6 weeks      2) Improve  L shoulder PROM  to >= 160 deg flex/abd and 80 deg ER/IR as her protocol allows in order to better reach overhead or behind back.       4-6 weeks      3) Improve  L shoulder pain from   5/10 to <= 0-3/10 with activity in order to improve QOL.        4-6 weeks      4) Improve quick dash score by >= 5 points in order to improve QOL.    4-6 weeks      5) The pt will improve ability to raise arm/shoulder overhead, reach at various angles, lift/carry objects, dress upper body, overhead activity and return to exercise and work around the home and/or on the job as her protocol allows without significant limitation.    4-6 weeks        ST) Pt/caregiver will be I and consistent with HEP with use of handout as needed in order to maximize shoulder strength and ROM/flexibility.       2-3 weeks

## 2025-05-28 ENCOUNTER — TREATMENT (OUTPATIENT)
Dept: PHYSICAL THERAPY | Facility: CLINIC | Age: 75
End: 2025-05-28
Payer: MEDICARE

## 2025-05-28 DIAGNOSIS — M25.512 CHRONIC LEFT SHOULDER PAIN: ICD-10-CM

## 2025-05-28 DIAGNOSIS — G89.29 CHRONIC LEFT SHOULDER PAIN: ICD-10-CM

## 2025-05-28 DIAGNOSIS — Z98.890 S/P LEFT ROTATOR CUFF REPAIR: ICD-10-CM

## 2025-05-28 PROCEDURE — 97110 THERAPEUTIC EXERCISES: CPT | Mod: GP,CQ

## 2025-05-28 PROCEDURE — 97140 MANUAL THERAPY 1/> REGIONS: CPT | Mod: GP,CQ

## 2025-05-28 ASSESSMENT — PAIN - FUNCTIONAL ASSESSMENT: PAIN_FUNCTIONAL_ASSESSMENT: 0-10

## 2025-05-28 ASSESSMENT — PAIN SCALES - GENERAL: PAINLEVEL_OUTOF10: 1

## 2025-05-28 NOTE — PROGRESS NOTES
Physical Therapy Treatment    Patient Name: Shona Hansen  MRN: [id number]  Today's Date: 5/28/2025  Time Calculation  Start Time: 1532  Stop Time: 1615  Time Calculation (min): 43 min  PT Therapeutic Procedures Time Entry  Manual Therapy Time Entry: 15  Therapeutic Exercise Time Entry: 28                 General:  General  Reason for Referral: L RTC Repair  Referred By: Dr. Hill  General Comment: Visit  4/8 (8/8)  (9/9) (9/9)  Visit# 30  Assessment:   Patient is limited by pain in bicep with resisted PRE's.   Is able to complete a few resisted exercises and AROM.   Focus is to continue to ROM and mobility until after upcoming surgery when patient can progress there ex.     Objective:   Firm end feel passively all planes especially ER.       Plan:  Plan to progress strength per protocol and as pain allows       OP PT Plan  Treatment/Interventions: Cryotherapy, Education/ Instruction, Manual therapy, Therapeutic exercises, Therapeutic activities  PT Plan: Skilled PT  PT Frequency: 2 times per week (1-2x/week)  Duration: 4 weeks  Certification Period Start Date: 05/05/25  Rehab Potential: Good  Plan of Care Agreement: Patient (Patient Goal:  Improve strength, ROM and use of L shoulder)    Current Problem  Problem List Items Addressed This Visit           ICD-10-CM    S/P left rotator cuff repair Z98.890    Chronic left shoulder pain M25.512, G89.29       Subjective Patient identified by name and date of birth. Denies any recent falls.  Patient is compliant with HEP completion with no issues to report at this time.   States that she has been cleaning at home and is a little more sore. 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     Precautions  Precautions  Precautions Comment: Lower Fall Risk.  PMH: HTN, R trigger finger.   S/P L RTC Repair on 1/23/2025.  See Protocol    Pain  Pain Assessment: 0-10  0-10 (Numeric) Pain Score:  "1  Pain Location: Shoulder  Pain Orientation: Left    Treatments:  UBE  fwd 3'   Wall slide flexion 2  x 10  (scaption)   Wall flexion with slow eccentric lowering x 10   Wall protraction 2 x 15 (X)  Wall clocks resisted Orange x 5 ea dir   Active shoulder anterior delt raise to 90* 2 x 10 (1#)  Active lateral delt raise no weight neutral position x 10 (1#) (X not today)   Resisted Rows gentle Mint band 2 x 10 (X not today d/t bicep pain)  Resisted ext gentle Mint band 2 x 10   Resisted ER 2 x 10  (Lime green)   Resisted IR 2 x 10 (Lime green)  Resisted Tricep ext 2 x 10 (Lime Green)   Seated ER with wand 2 x 10   Supine H ABD 2 x 10 Lime (P resistance)            Supine cane flexion x10  (P to active no weight)   Supine protraction  2 x 10 (2#)   Supine chest press 2 x 10 (1/2#) (X)   Wall push up with a plus x 10   Bent over row 3# 2 x10   Foam roll up wall x10 (Used ball with single arm instead         Not completed:   Pulley Flexion 3' (X)   Finger ladder to #20 x 5 (X)   Shoulder scap retractions x 20 (X)   Shoulder rolls bwd/fwd x 20 (X)   Seated flexion with wand to tolerance per protocol  2 x 10  (P 1/2#) (X)   Shoulder sub-max iso at the wall flex/ext/abd 10 x 5\"  (x )   Shoulder bicep 3 way 2 x 15  (2# wt)   (x not today)   Shoulder AAROM with SB on plinth (X)   -Flex/Ext  - HADD/HABD  - CWW/CW     Manual: 8'   Shoulder PROM all planes      OP EDUCATION:   Access Code: [email or web address]  URL: [email or web address]  Date: 04/23/2025  Prepared by: Rosa M Mcneal     Exercises  - Supine Shoulder Flexion Extension Full Range AROM  - 1 x daily - 7 x weekly - 2 sets - 10 reps  - Supine Shoulder Horizontal Abduction with Resistance  - 1 x daily - 7 x weekly - 2 sets - 10 reps  - Seated Shoulder Flexion with Dumbbells  - 1 x daily - 7 x weekly - 2 sets - 10 reps     Goals:  Active       PT Problem       PT Goal 1 (Progressing)       Start:  01/29/25    Expected End:  04/25/25       LTG's:  1) Improve " L  shoulder strength to >= 5-/5 throughout as her protocol allows in order to facilitate ability to reach overhead and lift objects.      4-6 weeks      2) Improve  L shoulder PROM  to >= 160 deg flex/abd and 80 deg ER/IR as her protocol allows in order to better reach overhead or behind back.       4-6 weeks      3) Improve  L shoulder pain from   5/10 to <= 0-3/10 with activity in order to improve QOL.        4-6 weeks      4) Improve quick dash score by >= 5 points in order to improve QOL.    4-6 weeks      5) The pt will improve ability to raise arm/shoulder overhead, reach at various angles, lift/carry objects, dress upper body, overhead activity and return to exercise and work around the home and/or on the job as her protocol allows without significant limitation.    4-6 weeks        ST) Pt/caregiver will be I and consistent with HEP with use of handout as needed in order to maximize shoulder strength and ROM/flexibility.       2-3 weeks

## 2025-06-02 ENCOUNTER — TREATMENT (OUTPATIENT)
Dept: PHYSICAL THERAPY | Facility: CLINIC | Age: 75
End: 2025-06-02
Payer: MEDICARE

## 2025-06-02 DIAGNOSIS — G89.29 CHRONIC LEFT SHOULDER PAIN: ICD-10-CM

## 2025-06-02 DIAGNOSIS — M25.512 CHRONIC LEFT SHOULDER PAIN: ICD-10-CM

## 2025-06-02 DIAGNOSIS — Z98.890 S/P LEFT ROTATOR CUFF REPAIR: ICD-10-CM

## 2025-06-02 PROCEDURE — 97110 THERAPEUTIC EXERCISES: CPT | Mod: GP,CQ

## 2025-06-02 PROCEDURE — 97140 MANUAL THERAPY 1/> REGIONS: CPT | Mod: GP,CQ

## 2025-06-02 ASSESSMENT — PAIN DESCRIPTION - DESCRIPTORS: DESCRIPTORS: TIGHTNESS;ACHING;DULL

## 2025-06-02 ASSESSMENT — PAIN - FUNCTIONAL ASSESSMENT: PAIN_FUNCTIONAL_ASSESSMENT: 0-10

## 2025-06-02 ASSESSMENT — PAIN SCALES - GENERAL: PAINLEVEL_OUTOF10: 2

## 2025-06-02 NOTE — PROGRESS NOTES
Physical Therapy Treatment    Patient Name: Shona Hansen  MRN: [id number]  Today's Date: 6/2/2025  Time Calculation  Start Time: 0915  Stop Time: 1000  Time Calculation (min): 45 min  PT Therapeutic Procedures Time Entry  Manual Therapy Time Entry: 15  Therapeutic Exercise Time Entry: 30                 General:  General  Reason for Referral: L RTC Repair  Referred By: Dr. Hill  General Comment: Visit  5/8 (8/8)  (9/9) (9/9)  Visit# 31      Assessment:   Patient is still limited on what she is able to do in PT due to the lipoma causing pain in the upper arm/shoulder.   Able to complete some PRE's in modified range today.     Objective:   Mild shoulder distraction completed during passive flexion to overcome crepitus.       Plan:  Plan to progress strength and ROM next visit if able. JA     OP PT Plan  Treatment/Interventions: Cryotherapy, Education/ Instruction, Manual therapy, Therapeutic exercises, Therapeutic activities  PT Plan: Skilled PT  PT Frequency: 2 times per week (1-2x/week)  Duration: 4 weeks  Certification Period Start Date: 05/05/25  Rehab Potential: Good  Plan of Care Agreement: Patient (Patient Goal:  Improve strength, ROM and use of L shoulder)    Current Problem  Problem List Items Addressed This Visit           ICD-10-CM    S/P left rotator cuff repair Z98.890    Chronic left shoulder pain M25.512, G89.29       Subjective Patient identified by name and date of birth. Denies any recent falls.  Patient is compliant with HEP completion with no issues to report at this time.  States that she has surgery tomorrow for the lipoma in her upper arm. She is going to double check on restrictions after surgery.     Precautions  Precautions  Precautions Comment: Lower Fall Risk.  PMH: HTN, R trigger finger.   S/P L RTC Repair on 1/23/2025.  See Protocol    Pain  Pain Assessment: 0-10  0-10 (Numeric) Pain Score: 2  Pain Location: Shoulder  Pain Orientation: Left  Pain Descriptors: Tightness, Aching,  "Dull    Treatments:  UBE  fwd 3'   Wall slide flexion 2  x 10  (scaption)   Wall flexion with slow eccentric lowering x 10   Wall protraction 2 x 15 (X)  Wall clocks resisted Orange x 5 ea dir   Active shoulder anterior delt raise to 90* 2 x 10 (1#)  Active lateral delt raise no weight neutral position x 10 (1#) (X not today)   Resisted Rows gentle Mint band 2 x 10  Resisted ext gentle Mint band 2 x 10   Resisted ER 2 x 10  (Lime green)   Resisted IR 2 x 10 (Lime green)  Resisted Tricep ext 2 x 10 (Lime Green)   Seated ER with wand 2 x 10   Supine H ABD 2 x 10 Lime (P resistance)                                                                                                          Supine cane flexion x10  (P to active no weight)   Supine protraction  2 x 10 (2#)   Supine chest press 2 x 10 (1/2#) (X)   Wall push up with a plus x 10   Bent over row 3# 2 x10   Foam roll up wall x10 (Used ball with single arm instead         Not completed:   Pulley Flexion 3' (X)   Finger ladder to #20 x 5 (X)   Shoulder scap retractions x 20 (X)   Shoulder rolls bwd/fwd x 20 (X)   Seated flexion with wand to tolerance per protocol  2 x 10  (P 1/2#) (X)   Shoulder sub-max iso at the wall flex/ext/abd 10 x 5\"  (x )   Shoulder bicep 3 way 2 x 15  (2# wt)   (x not today)   Shoulder AAROM with SB on plinth (X)   -Flex/Ext  - HADD/HABD  - CWW/CW     Manual: 8'   Shoulder PROM all planes      OP EDUCATION:   Access Code: [email or web address]  URL: [email or web address]  Date: 04/23/2025  Prepared by: Rosa M Mcneal     Exercises  - Supine Shoulder Flexion Extension Full Range AROM  - 1 x daily - 7 x weekly - 2 sets - 10 reps  - Supine Shoulder Horizontal Abduction with Resistance  - 1 x daily - 7 x weekly - 2 sets - 10 reps  - Seated Shoulder Flexion with Dumbbells  - 1 x daily - 7 x weekly - 2 sets - 10 reps     Goals:  Active       PT Problem       PT Goal 1 (Progressing)       Start:  01/29/25    Expected End:  04/25/25       " LTG's:  1) Improve L  shoulder strength to >= 5-/5 throughout as her protocol allows in order to facilitate ability to reach overhead and lift objects.      4-6 weeks      2) Improve  L shoulder PROM  to >= 160 deg flex/abd and 80 deg ER/IR as her protocol allows in order to better reach overhead or behind back.       4-6 weeks      3) Improve  L shoulder pain from   5/10 to <= 0-3/10 with activity in order to improve QOL.        4-6 weeks      4) Improve quick dash score by >= 5 points in order to improve QOL.    4-6 weeks      5) The pt will improve ability to raise arm/shoulder overhead, reach at various angles, lift/carry objects, dress upper body, overhead activity and return to exercise and work around the home and/or on the job as her protocol allows without significant limitation.    4-6 weeks        ST) Pt/caregiver will be I and consistent with HEP with use of handout as needed in order to maximize shoulder strength and ROM/flexibility.       2-3 weeks

## 2025-06-04 ENCOUNTER — APPOINTMENT (OUTPATIENT)
Dept: PHYSICAL THERAPY | Facility: CLINIC | Age: 75
End: 2025-06-04
Payer: MEDICARE

## 2025-06-09 ENCOUNTER — APPOINTMENT (OUTPATIENT)
Dept: PHYSICAL THERAPY | Facility: CLINIC | Age: 75
End: 2025-06-09
Payer: MEDICARE

## 2025-06-11 ENCOUNTER — TREATMENT (OUTPATIENT)
Dept: PHYSICAL THERAPY | Facility: CLINIC | Age: 75
End: 2025-06-11
Payer: MEDICARE

## 2025-06-11 DIAGNOSIS — M25.512 CHRONIC LEFT SHOULDER PAIN: ICD-10-CM

## 2025-06-11 DIAGNOSIS — G89.29 CHRONIC LEFT SHOULDER PAIN: ICD-10-CM

## 2025-06-11 DIAGNOSIS — Z98.890 S/P LEFT ROTATOR CUFF REPAIR: ICD-10-CM

## 2025-06-11 PROCEDURE — 97110 THERAPEUTIC EXERCISES: CPT | Mod: GP,CQ

## 2025-06-11 PROCEDURE — 97140 MANUAL THERAPY 1/> REGIONS: CPT | Mod: GP,CQ

## 2025-06-11 ASSESSMENT — PAIN SCALES - GENERAL: PAINLEVEL_OUTOF10: 0 - NO PAIN

## 2025-06-11 ASSESSMENT — PAIN - FUNCTIONAL ASSESSMENT: PAIN_FUNCTIONAL_ASSESSMENT: 0-10

## 2025-06-11 ASSESSMENT — PAIN DESCRIPTION - DESCRIPTORS: DESCRIPTORS: TIGHTNESS;ACHING

## 2025-06-11 NOTE — PROGRESS NOTES
Physical Therapy Treatment    Patient Name: Shona Hansen  MRN: [id number]  Today's Date: 6/11/2025  Time Calculation  Start Time: 0915  Stop Time: 0955  Time Calculation (min): 40 min  PT Therapeutic Procedures Time Entry  Therapeutic Exercise Time Entry: 30  Manual Therapy Time Entry: 10                 General:  General  Reason for Referral: L RTC Repair  Referred By: Dr. Hill  General Comment: Visit  6/8 (8/8)  (9/9) (9/9)  Visit# 32      Assessment:   Patient only had mild exacerbation of pain with resisted exercises today.   Fatigued and challenged by end of session.     Objective:   Active Shoulder flexion to 90 degrees with no compensations.     Plan:  Plan to progress strength per protocol. JA     OP PT Plan  Treatment/Interventions: Cryotherapy, Education/ Instruction, Manual therapy, Therapeutic exercises, Therapeutic activities  PT Plan: Skilled PT  PT Frequency: 2 times per week (1-2x/week)  Duration: 4 weeks  Certification Period Start Date: 05/05/25  Rehab Potential: Good  Plan of Care Agreement: Patient (Patient Goal:  Improve strength, ROM and use of L shoulder)    Current Problem  Problem List Items Addressed This Visit           ICD-10-CM    S/P left rotator cuff repair Z98.890    Chronic left shoulder pain M25.512, G89.29       Subjective Patient identified by name and date of birth. Denies any recent falls.  Patient is compliant with HEP completion with no issues to report at this time.  States that she has been able to do some AROM on the shoulder since having the lipoma surgery due to having a drain in place. Notes that she does not currently have restrictions with PT and got her drain out, will get staples removed next week.       Precautions  Precautions  Precautions Comment: Lower Fall Risk.  PMH: HTN, R trigger finger.   S/P L RTC Repair on 1/23/2025.  See Protocol    Pain  Pain Assessment: 0-10  0-10 (Numeric) Pain Score: 0 - No pain  Pain Location: Shoulder  Pain Orientation: Left  Pain  "Descriptors: Tightness, Aching    Treatments:  UBE  fwd 3'   Wall slide flexion 2  x 10  (scaption)   Wall clocks resisted [place] x 5 ea dir   Active shoulder anterior delt raise to 90* 2 x 10 (1#)  Active lateral delt raise no weight neutral position x 10 (1#) (X not today)   Resisted Rows gentle Purple tube 2 x 10 (P resistance)   Resisted ext gentle Mint band 2 x 10 (P)   Resisted ER 2 x 10  (Mint  green) (P)  Resisted IR 2 x 10 (Mint green)(P)   Resisted Tricep ext 2 x 10 (Mint Green) (P)  Seated ER with wand 2 x 10   Supine H ABD 2 x 10 Lime (P resistance)                                                                                                          Supine cane flexion x10  (P to active no weight)   Supine protraction  2 x 10 (2#)   Supine chest press 2 x 10 (1/2#) (X)   Wall push up with a plus x 10   Bent over row 3# 2 x10   Foam roll up wall x10 (Used ball with single arm instead         Not completed:   Pulley Flexion 3' (X)   Wall flexion with slow eccentric lowering x 10 (X)   Wall protraction 2 x 15 (X)  Finger ladder to #20 x 5 (X)   Shoulder scap retractions x 20 (X)   Shoulder rolls bwd/fwd x 20 (X)   Seated flexion with wand to tolerance per protocol  2 x 10  (P 1/2#) (X)   Shoulder sub-max iso at the wall flex/ext/abd 10 x 5\"  (x )   Shoulder bicep 3 way 2 x 15  (2# wt)   (x not today)   Shoulder AAROM with SB on plinth (X)   -Flex/Ext  - HADD/HABD  - CWW/CW     Manual: 10'   Shoulder PROM all planes         OP EDUCATION:   Access Code: [email or web address]  URL: [email or web address]  Date: 04/23/2025  Prepared by: Rosa M Mcneal     Exercises  - Supine Shoulder Flexion Extension Full Range AROM  - 1 x daily - 7 x weekly - 2 sets - 10 reps  - Supine Shoulder Horizontal Abduction with Resistance  - 1 x daily - 7 x weekly - 2 sets - 10 reps  - Seated Shoulder Flexion with Dumbbells  - 1 x daily - 7 x weekly - 2 sets - 10 reps    Goals:  Active       PT Problem       PT Goal 1 " (Progressing)       Start:  25    Expected End:  25       LTG's:  1) Improve L  shoulder strength to >= 5-/5 throughout as her protocol allows in order to facilitate ability to reach overhead and lift objects.      4-6 weeks      2) Improve  L shoulder PROM  to >= 160 deg flex/abd and 80 deg ER/IR as her protocol allows in order to better reach overhead or behind back.       4-6 weeks      3) Improve  L shoulder pain from   5/10 to <= 0-3/10 with activity in order to improve QOL.        4-6 weeks      4) Improve quick dash score by >= 5 points in order to improve QOL.    4-6 weeks      5) The pt will improve ability to raise arm/shoulder overhead, reach at various angles, lift/carry objects, dress upper body, overhead activity and return to exercise and work around the home and/or on the job as her protocol allows without significant limitation.    4-6 weeks        ST) Pt/caregiver will be I and consistent with HEP with use of handout as needed in order to maximize shoulder strength and ROM/flexibility.       2-3 weeks

## 2025-06-13 ENCOUNTER — TREATMENT (OUTPATIENT)
Dept: PHYSICAL THERAPY | Facility: CLINIC | Age: 75
End: 2025-06-13
Payer: MEDICARE

## 2025-06-13 DIAGNOSIS — M25.512 CHRONIC LEFT SHOULDER PAIN: ICD-10-CM

## 2025-06-13 DIAGNOSIS — G89.29 CHRONIC LEFT SHOULDER PAIN: ICD-10-CM

## 2025-06-13 DIAGNOSIS — Z98.890 S/P LEFT ROTATOR CUFF REPAIR: ICD-10-CM

## 2025-06-13 PROCEDURE — 97110 THERAPEUTIC EXERCISES: CPT | Mod: GP,CQ

## 2025-06-13 PROCEDURE — 97140 MANUAL THERAPY 1/> REGIONS: CPT | Mod: GP,CQ

## 2025-06-13 ASSESSMENT — PAIN - FUNCTIONAL ASSESSMENT: PAIN_FUNCTIONAL_ASSESSMENT: 0-10

## 2025-06-13 ASSESSMENT — PAIN SCALES - GENERAL: PAINLEVEL_OUTOF10: 1

## 2025-06-13 ASSESSMENT — PAIN DESCRIPTION - DESCRIPTORS: DESCRIPTORS: TIGHTNESS;ACHING

## 2025-06-13 NOTE — PROGRESS NOTES
Physical Therapy Treatment    Patient Name: Shona Hansen  MRN: [id number]  Today's Date: 6/13/2025  Time Calculation  Start Time: 1045  Stop Time: 1128  Time Calculation (min): 43 min  PT Therapeutic Procedures Time Entry  Therapeutic Exercise Time Entry: 33  Manual Therapy Time Entry: 10                 General:  General  Reason for Referral: L RTC Repair  Referred By: Dr. Hill  General Comment: Visit  6/8 (8/8)  (9/9) (9/9)  Visit# 33    Assessment:   Improved concentric and eccentric strength with resisted exercises.   Progressed resistance with PRE's.     Objective:   Decreased compensations observed in the clinic with exercises.     Plan:  Plan to progress strength for improved functional mobility and strength with ADL's. JA     OP PT Plan  Treatment/Interventions: Cryotherapy, Education/ Instruction, Manual therapy, Therapeutic exercises, Therapeutic activities  PT Plan: Skilled PT  PT Frequency: 2 times per week (1-2x/week)  Duration: 4 weeks  Certification Period Start Date: 05/05/25  Rehab Potential: Good  Plan of Care Agreement: Patient (Patient Goal:  Improve strength, ROM and use of L shoulder)    Current Problem  Problem List Items Addressed This Visit           ICD-10-CM    S/P left rotator cuff repair Z98.890    Chronic left shoulder pain M25.512, G89.29       Subjective Patient identified by name and date of birth. Denies any recent falls.  Patient is compliant with HEP completion with no issues to report at this time.  States that she is only having     Precautions  Precautions  Precautions Comment: Lower Fall Risk.  PMH: HTN, R trigger finger.   S/P L RTC Repair on 1/23/2025.  See Protocol    Pain  Pain Assessment: 0-10  0-10 (Numeric) Pain Score: 1  Pain Location: Shoulder  Pain Orientation: Left  Pain Descriptors: Tightness, Aching    Treatments:  UBE  fwd 3'   Wall slide flexion 2  x 10  (scaption)   Wall clocks resisted [place] x 5 ea dir   Active shoulder anterior delt raise to 90* 2 x 10  "(1#)  Active lateral delt raise no weight neutral position x 10 (1#) (X not today)   Resisted Rows gentle Purple tube 2 x 10   Resisted ext gentle Purple tube 2 x 10   Resisted ER 2 x 10  (Mint  green)   Resisted IR 2 x 10 (Mint green)   Resisted Tricep ext 2 x 10 (Mint Green)   Seated ER with wand 2 x 10 (X)   Supine H ABD 2 x 10 Lime (P resistance)                                                                                                          Supine cane flexion x10  (P to active no weight)   Supine protraction  2 x 10 (2#)   Supine chest press 2 x 10 (1/2#) (X)   Wall push up with a plus x 10   Bent over row 3# 2 x10   Foam roll up wall x10 (Used ball with single arm instead         Not completed:   Pulley Flexion 3' (X)   Wall flexion with slow eccentric lowering x 10 (X)   Wall protraction 2 x 15 (X)  Finger ladder to #20 x 5 (X)   Shoulder scap retractions x 20 (X)   Shoulder rolls bwd/fwd x 20 (X)   Seated flexion with wand to tolerance per protocol  2 x 10  (P 1/2#) (X)   Shoulder sub-max iso at the wall flex/ext/abd 10 x 5\"  (x )   Shoulder bicep 3 way 2 x 15  (2# wt)   (x not today)   Shoulder AAROM with SB on plinth (X)   -Flex/Ext  - HADD/HABD  - CWW/CW     Manual: 10'   Shoulder PROM all planes      OP EDUCATION:   Access Code: [email or web address]  URL: [email or web address]  Date: 04/23/2025  Prepared by: Rosa M Mcneal     Exercises  - Supine Shoulder Flexion Extension Full Range AROM  - 1 x daily - 7 x weekly - 2 sets - 10 reps  - Supine Shoulder Horizontal Abduction with Resistance  - 1 x daily - 7 x weekly - 2 sets - 10 reps  - Seated Shoulder Flexion with Dumbbells  - 1 x daily - 7 x weekly - 2 sets - 10 reps       Goals:  Active       PT Problem       PT Goal 1 (Progressing)       Start:  01/29/25    Expected End:  04/25/25       LTG's:  1) Improve L  shoulder strength to >= 5-/5 throughout as her protocol allows in order to facilitate ability to reach overhead and lift " objects.      4-6 weeks      2) Improve  L shoulder PROM  to >= 160 deg flex/abd and 80 deg ER/IR as her protocol allows in order to better reach overhead or behind back.       4-6 weeks      3) Improve  L shoulder pain from   5/10 to <= 0-3/10 with activity in order to improve QOL.        4-6 weeks      4) Improve quick dash score by >= 5 points in order to improve QOL.    4-6 weeks      5) The pt will improve ability to raise arm/shoulder overhead, reach at various angles, lift/carry objects, dress upper body, overhead activity and return to exercise and work around the home and/or on the job as her protocol allows without significant limitation.    4-6 weeks        ST) Pt/caregiver will be I and consistent with HEP with use of handout as needed in order to maximize shoulder strength and ROM/flexibility.       2-3 weeks               DISPLAY PLAN FREE TEXT

## 2025-06-16 ENCOUNTER — TREATMENT (OUTPATIENT)
Dept: PHYSICAL THERAPY | Facility: CLINIC | Age: 75
End: 2025-06-16
Payer: MEDICARE

## 2025-06-16 DIAGNOSIS — Z98.890 S/P LEFT ROTATOR CUFF REPAIR: Primary | ICD-10-CM

## 2025-06-16 DIAGNOSIS — G89.29 CHRONIC LEFT SHOULDER PAIN: ICD-10-CM

## 2025-06-16 DIAGNOSIS — M25.512 CHRONIC LEFT SHOULDER PAIN: ICD-10-CM

## 2025-06-16 PROCEDURE — 97110 THERAPEUTIC EXERCISES: CPT | Mod: GP

## 2025-06-16 ASSESSMENT — PAIN - FUNCTIONAL ASSESSMENT: PAIN_FUNCTIONAL_ASSESSMENT: 0-10

## 2025-06-16 ASSESSMENT — PAIN DESCRIPTION - DESCRIPTORS: DESCRIPTORS: ACHING;TIGHTNESS

## 2025-06-16 ASSESSMENT — PAIN SCALES - GENERAL: PAINLEVEL_OUTOF10: 2

## 2025-06-16 NOTE — PROGRESS NOTES
Physical Therapy          Physical Therapy Treatment          Patient Name: Shona Hansen     MRN: [id number]     : 1950      Alok Hill     Today's Date: 2025     Time Calculation  Start Time: 845  Stop Time: 925  Time Calculation (min): 40 min                             General     Reason for Referral: L RTC Repair  Referred By: Dr. Hill  General Comment: Visit   ()  () ()     Visit #34           Current Problem     Problem List Items Addressed This Visit           ICD-10-CM       Musculoskeletal and Injuries    S/P left rotator cuff repair Z98.890    Chronic left shoulder pain M25.512, G89.29                        Subjective      Pt reports that she had a lipoma excision recently. Pt reports 2/10 pain. Pt reports that her surgeon's office told her to not lift >3# until after this next check up.      Pt.'s name and  were confirmed this date.          Pt. Reports compliance with HEP.          Precautions     Precautions  Precautions Comment: Lower Fall Risk.  PMH: HTN, R trigger finger.   S/P L RTC Repair on 2025.  See Protocol          Pain     Pain Assessment: 0-10  0-10 (Numeric) Pain Score: 2  Pain Location: Shoulder  Pain Orientation: Left  Pain Descriptors: Aching, Tightness          Objective      Pt has pin-point drainage on bottom lipoma portal. No odor, pus, but told to call physician to be on the safe side.      Upper Extremity AROM:??   ??  RIGHT??  LEFT??    Shldr Abduction??  °??  ?? 130° (AAROM 160°)   Shldr ER??  ??  ??    Shldr IR??  ??  ??    Shldr Flexion??  ???  ??? 141° (AAROM 165°)   Elbow Flexion??  ???  ???    ?Elbow Ext??  ???  ???    ???  ???  ???      Upper Extremity Strength:??   MMT 5/5 max???  RIGHT??  LEFT??    Shldr Abduction??  ??  ?? 4/5   Shldr ER??  ??  ?? 4+/5   Shldr IR??  ??  ?? 4+/5   Shldr Flexion??  ???  ??? 4/5   Elbow Flexion??  ???  ???    ?Elbow Ext??  ???  ???    ???  ???  ???              Pt's name and  were confirmed  today.          Outcome Measures:     Other Measures  Disability of Arm Shoulder Hand (DASH): 23          Treatments:        Therapeutic exercise   UBE  fwd 3'   Wall slide flexion and scaption 2  x 10    Wall clocks resisted [place] x 5 ea dir   OTB ER iso. c flexion 2x10  Active shoulder anterior delt raise to 90* 2 x 10 (1#)  Resisted Rows Purple tube 2 x 15   Resisted ext gentle Purple tube 2 x 15  Resisted ER 2 x 15  (Orange  green)   Resisted IR 2 x 15 (Orange green)   Resisted Tricep ext 2 x 10 (Mint Green)   Seated ER with wand x 10   Supine H ABD 2 x 15 Lime (P resistance)                                                                                                          Supine cane flexion x10  (P to active no weight)   Supine protraction  2 x 10 (2#)   Wall push up with a plus x 10   Bent over row 3# 2 x10   Foam roll up wall x10 (Used ball with single arm instead     PATIENT EDUCATION:   Outpatient Education  Individual(s) Educated: Patient  Education Provided: Anatomy, Body Mechanics, Home Exercise Program, Physiology, POC  Risk and Benefits Discussed with Patient/Caregiver/Other: yes  Patient/Caregiver Demonstrated Understanding: yes  Plan of Care Discussed and Agreed Upon: yes  Patient Response to Education: Patient/Caregiver Verbalized Understanding of Information          Assessment:   Pt has made good progress towards all goals, including strength, AROM, QuickDASH, and pain, though pt has been limited by lipoma and excision of lipoma and has therefore not met goals quite yet. The pt was given a new PT prescription by the surgeon due to continued need for PT services. Pt could benefit from continued PT services to address strength, AROM, and functional ability.              Plan:   Continue c strengthening, AROM, and functional ability.   OP PT Plan  Treatment/Interventions: Cryotherapy, Education/ Instruction, Manual therapy, Therapeutic exercises, Therapeutic activities  PT Plan: Skilled  PT  PT Frequency: 2 times per week (1-2x/week)  Duration: 4 weeks  Certification Period Start Date: 25  Rehab Potential: Good  Plan of Care Agreement: Patient (Patient Goal:  Improve strength, ROM and use of L shoulder)          Goals:     Active       PT Problem       PT Goal 1 (Progressing)       Start:  25    Expected End:  25       LTG's:  1) Improve L  shoulder strength to >= 5-/5 throughout as her protocol allows in order to facilitate ability to reach overhead and lift objects.      4-6 weeks      2) Improve  L shoulder PROM  to >= 160 deg flex/abd and 80 deg ER/IR as her protocol allows in order to better reach overhead or behind back.       4-6 weeks      3) Improve  L shoulder pain from   5/10 to <= 0-3/10 with activity in order to improve QOL.        4-6 weeks      4) Improve quick dash score by >= 5 points in order to improve QOL.    4-6 weeks      5) The pt will improve ability to raise arm/shoulder overhead, reach at various angles, lift/carry objects, dress upper body, overhead activity and return to exercise and work around the home and/or on the job as her protocol allows without significant limitation.    4-6 weeks        ST) Pt/caregiver will be I and consistent with HEP with use of handout as needed in order to maximize shoulder strength and ROM/flexibility.       2-3 weeks

## 2025-06-20 ENCOUNTER — APPOINTMENT (OUTPATIENT)
Dept: PHYSICAL THERAPY | Facility: CLINIC | Age: 75
End: 2025-06-20
Payer: MEDICARE

## 2025-06-23 ENCOUNTER — APPOINTMENT (OUTPATIENT)
Dept: PHYSICAL THERAPY | Facility: CLINIC | Age: 75
End: 2025-06-23
Payer: MEDICARE

## 2025-06-25 ENCOUNTER — TREATMENT (OUTPATIENT)
Dept: PHYSICAL THERAPY | Facility: CLINIC | Age: 75
End: 2025-06-25
Payer: MEDICARE

## 2025-06-25 DIAGNOSIS — Z98.890 S/P LEFT ROTATOR CUFF REPAIR: ICD-10-CM

## 2025-06-25 DIAGNOSIS — G89.29 CHRONIC LEFT SHOULDER PAIN: ICD-10-CM

## 2025-06-25 DIAGNOSIS — M25.512 CHRONIC LEFT SHOULDER PAIN: ICD-10-CM

## 2025-06-25 PROCEDURE — 97110 THERAPEUTIC EXERCISES: CPT | Mod: GP,CQ

## 2025-06-25 PROCEDURE — 97140 MANUAL THERAPY 1/> REGIONS: CPT | Mod: GP,CQ

## 2025-06-25 ASSESSMENT — PAIN DESCRIPTION - DESCRIPTORS: DESCRIPTORS: ACHING;TIGHTNESS

## 2025-06-25 ASSESSMENT — PAIN - FUNCTIONAL ASSESSMENT: PAIN_FUNCTIONAL_ASSESSMENT: 0-10

## 2025-06-25 ASSESSMENT — PAIN SCALES - GENERAL: PAINLEVEL_OUTOF10: 1

## 2025-06-25 NOTE — PROGRESS NOTES
Physical Therapy Treatment    Patient Name: Shona Hansen  MRN: [id number]  Today's Date: 6/25/2025  Time Calculation  Start Time: 1045  Stop Time: 1129  Time Calculation (min): 44 min  PT Therapeutic Procedures Time Entry  Therapeutic Exercise Time Entry: 35  Manual Therapy Time Entry: 8                 General:  General  Reason for Referral: L RTC Repair  Referred By: Dr. Hill  General Comment: Visit 1/8  (7/8) (8/8)  (9/9) (9/9)  Visit# 35      Assessment:   Added new exercises to HEP with correct technique demonstrated and patient verbalizing understanding of instruction. (see below)  Progressed to new strengthening exercises with minimal exacerbation of pain.       Objective:   Tactile cues for new PRE's for improved technique.  [place] with passive flexion.     Plan:  Plan to progress strength and ROM for improved functional use of UE. JA      OP PT Plan  Treatment/Interventions: Cryotherapy, Education/ Instruction, Manual therapy, Therapeutic exercises, Therapeutic activities  PT Plan: Skilled PT  PT Frequency: 2 times per week (1-2x/week)  Duration: 4 weeks  Certification Period Start Date: 06/16/25  Rehab Potential: Good  Plan of Care Agreement: Patient (Patient Goal:  Improve strength, ROM and use of L shoulder)    Current Problem  Problem List Items Addressed This Visit           ICD-10-CM    S/P left rotator cuff repair Z98.890    Chronic left shoulder pain M25.512, G89.29       Subjective Patient identified by name and date of birth. Denies any recent falls.  Patient is compliant with HEP completion with no issues to report at this time.  Patient has VM stating that she can increase weight per tolerance per MD.   Notes that her incision is healing from the lipoma surgery and she does not have staples.       Precautions  Precautions  Precautions Comment: Lower Fall Risk.  PMH: HTN, R trigger finger.   S/P L RTC Repair on 1/23/2025.  See Protocol    Pain  Pain Assessment: 0-10  0-10 (Numeric) Pain Score:  1  Pain Location: Shoulder  Pain Orientation: Left  Pain Descriptors: Aching, Tightness    Treatments:  UBE  fwd 3'   Wall slide flexion and scaption 2  x 10    Wall clocks resisted [place] x 5 ea dir   OTB ER iso. c flexion 2 x 10  Active shoulder anterior delt raise to 90* 2 x 10 (1#)  Resisted Rows Purple tube 2 x 15   Resisted ext gentle Purple tube 2 x 15  Resisted ER 2 x 15  (Lime  green)   Resisted IR 2 x 15 (Lime green)   Resisted Tricep ext 2 x 10 (Mint Green)   Supine H ABD 2 x 15 Lime (P to standing)  Standing Diagonal Habd Lime Green) (N)   Chest press resisted Pink tubing 2 x 15 (N)                                                                                                                Supine cane flexion x10  (P to active no weight)   Supine protraction  2 x 10 (2#)   Wall push up with a plus x 10   Bent over row 3# 2 x10   Bicep curls supinated 2 x 10 3# (N)   Bicep curls neutral 2 x 10 3# (N)   Foam roll up wall x10 (Used ball with single arm instead       Seated ER with wand x 10 (X)     OP EDUCATION:   Access Code: [email or web address]  URL: [email or web address]  Date: 06/25/2025  Prepared by: Rosa M Mcneal    Exercises  - Standing Row with Anchored Resistance  - 1 x daily - 7 x weekly - 2 sets - 10 reps  - Shoulder Extension with Resistance  - 1 x daily - 7 x weekly - 2 sets - 10 reps  - Shoulder External Rotation with Anchored Resistance  - 1 x daily - 7 x weekly - 2 sets - 10 reps  - Shoulder Internal Rotation with Resistance  - 1 x daily - 7 x weekly - 2 sets - 10 reps  - Standing Shoulder Horizontal Abduction with Resistance  - 1 x daily - 7 x weekly - 2 sets - 10 reps  - Standing Shoulder Diagonal Horizontal Abduction 60/120 Degrees with Resistance  - 1 x daily - 7 x weekly - 2 sets - 10 reps - 2 hold  - Standing Bicep Curls Supinated with Dumbbells  - 1 x daily - 7 x weekly - 2 sets - 10 reps  - Standing Bicep Curls Neutral with Dumbbells  - 1 x daily - 7 x weekly - 2 sets -  10 reps  - Standing Tricep Extensions with Resistance  - 1 x daily - 7 x weekly - 2 sets - 10 reps    Access Code: [email or web address]  URL: [email or web address]  Date: 2025  Prepared by: Rosa M Mcneal     Exercises  - Supine Shoulder Flexion Extension Full Range AROM  - 1 x daily - 7 x weekly - 2 sets - 10 reps  - Supine Shoulder Horizontal Abduction with Resistance  - 1 x daily - 7 x weekly - 2 sets - 10 reps  - Seated Shoulder Flexion with Dumbbells  - 1 x daily - 7 x weekly - 2 sets - 10 reps       Goals:  Active       PT Problem       PT Goal 1 (Progressing)       Start:  25    Expected End:  25       LTG's:  1) Improve L  shoulder strength to >= 5-/5 throughout as her protocol allows in order to facilitate ability to reach overhead and lift objects.      4-6 weeks      2) Improve  L shoulder PROM  to >= 160 deg flex/abd and 80 deg ER/IR as her protocol allows in order to better reach overhead or behind back.       4-6 weeks      3) Improve  L shoulder pain from   5/10 to <= 0-3/10 with activity in order to improve QOL.        4-6 weeks      4) Improve quick dash score by >= 5 points in order to improve QOL.    4-6 weeks      5) The pt will improve ability to raise arm/shoulder overhead, reach at various angles, lift/carry objects, dress upper body, overhead activity and return to exercise and work around the home and/or on the job as her protocol allows without significant limitation.    4-6 weeks        ST) Pt/caregiver will be I and consistent with HEP with use of handout as needed in order to maximize shoulder strength and ROM/flexibility.       2-3 weeks

## 2025-06-27 ENCOUNTER — TREATMENT (OUTPATIENT)
Dept: PHYSICAL THERAPY | Facility: CLINIC | Age: 75
End: 2025-06-27
Payer: MEDICARE

## 2025-06-27 DIAGNOSIS — G89.29 CHRONIC LEFT SHOULDER PAIN: ICD-10-CM

## 2025-06-27 DIAGNOSIS — M25.512 CHRONIC LEFT SHOULDER PAIN: ICD-10-CM

## 2025-06-27 DIAGNOSIS — Z98.890 S/P LEFT ROTATOR CUFF REPAIR: ICD-10-CM

## 2025-06-27 PROCEDURE — 97110 THERAPEUTIC EXERCISES: CPT | Mod: GP,CQ

## 2025-06-27 PROCEDURE — 97140 MANUAL THERAPY 1/> REGIONS: CPT | Mod: GP,CQ

## 2025-06-27 ASSESSMENT — PAIN - FUNCTIONAL ASSESSMENT: PAIN_FUNCTIONAL_ASSESSMENT: 0-10

## 2025-06-27 ASSESSMENT — PAIN DESCRIPTION - DESCRIPTORS: DESCRIPTORS: ACHING

## 2025-06-27 NOTE — PROGRESS NOTES
hPhysical Therapy Treatment    Patient Name: Shona Hansen  MRN: [id number]  Today's Date: 6/27/2025  Time Calculation  Start Time: 1004  Stop Time: 1045  Time Calculation (min): 41 min  PT Therapeutic Procedures Time Entry  Therapeutic Exercise Time Entry: 33  Manual Therapy Time Entry: 8                 General:  General  Reason for Referral: L RTC Repair  Referred By: Dr. Hill  General Comment: Visit 2/8  (7/8) (8/8)  (9/9) (9/9)  Visit# 36    Assessment:   Progressed PRE's completed previously but did not add new exercises today.   Mild compensations with resisted PRE's with GH elevation present.     Objective:     Increased end range tightness passively.   Palpable crepitus with passive flexion at  degree range.     Plan:  Plan to progress strength and ROM as able for improved     OP PT Plan  Treatment/Interventions: Cryotherapy, Education/ Instruction, Manual therapy, Therapeutic exercises, Therapeutic activities  PT Plan: Skilled PT  PT Frequency: 2 times per week (1-2x/week)  Duration: 4 weeks  Certification Period Start Date: 06/16/25  Rehab Potential: Good  Plan of Care Agreement: Patient (Patient Goal:  Improve strength, ROM and use of L shoulder)    Current Problem  Problem List Items Addressed This Visit           ICD-10-CM    S/P left rotator cuff repair Z98.890    Chronic left shoulder pain M25.512, G89.29       Subjective Patient identified by name and date of birth. Denies any recent falls.  Patient is compliant with HEP completion with no issues to report at this time.  States that she is sore from making a bed. States she had trouble with the corner.     Precautions  Precautions  Precautions Comment: Lower Fall Risk.  PMH: HTN, R trigger finger.   S/P L RTC Repair on 1/23/2025.  See Protocol    Pain  Pain Assessment: 0-10  0-10 (Numeric) Pain Score:  (1.5)  Pain Location: Shoulder  Pain Orientation: Left  Pain Descriptors: Aching    Treatments:  UBE  fwd 3'   Wall slide flexion and scaption 2   x 10    Wall clocks resisted Green  x 10  ea dir (P reps)  Active shoulder anterior delt raise to 90* 2 x 10 (1#)  Resisted Rows Purple tube 2 x 15   Resisted ext gentle Purple tube 2 x 15  Resisted ER 2 x 15  (Lime  green)   Resisted IR 2 x 15 (Lime green)   Resisted Tricep ext 2 x 10 (Mint Green)   Supine H ABD 2 x 15 Lime (standing)  Standing Diagonal Habd Lime Green) (N)   Chest press resisted Pink tubing 2 x 15 (N)     Supine cane flexion x10  (P to active no weight)   Supine protraction  2 x 10 (2#)   Wall push up with a plus x 10   Bent over row 3# 2 x10   Bicep curls supinated 2 x 10 3#   Bicep curls neutral 2 x 10 3#   Ball on wall  up wall 2# x10 ea dir         Seated ER with wand x 10 (X)        OP EDUCATION:   Access Code: [email or web address]  URL: [email or web address]  Date: 06/25/2025  Prepared by: Rosa M Mcneal     Exercises  - Standing Row with Anchored Resistance  - 1 x daily - 7 x weekly - 2 sets - 10 reps  - Shoulder Extension with Resistance  - 1 x daily - 7 x weekly - 2 sets - 10 reps  - Shoulder External Rotation with Anchored Resistance  - 1 x daily - 7 x weekly - 2 sets - 10 reps  - Shoulder Internal Rotation with Resistance  - 1 x daily - 7 x weekly - 2 sets - 10 reps  - Standing Shoulder Horizontal Abduction with Resistance  - 1 x daily - 7 x weekly - 2 sets - 10 reps  - Standing Shoulder Diagonal Horizontal Abduction 60/120 Degrees with Resistance  - 1 x daily - 7 x weekly - 2 sets - 10 reps - 2 hold  - Standing Bicep Curls Supinated with Dumbbells  - 1 x daily - 7 x weekly - 2 sets - 10 reps  - Standing Bicep Curls Neutral with Dumbbells  - 1 x daily - 7 x weekly - 2 sets - 10 reps  - Standing Tricep Extensions with Resistance  - 1 x daily - 7 x weekly - 2 sets - 10 reps     Access Code: [email or web address]  URL: [email or web address]  Date: 04/23/2025  Prepared by: Rosa M Mcneal     Exercises  - Supine Shoulder Flexion Extension Full Range AROM  - 1 x daily - 7  x weekly - 2 sets - 10 reps  - Supine Shoulder Horizontal Abduction with Resistance  - 1 x daily - 7 x weekly - 2 sets - 10 reps  - Seated Shoulder Flexion with Dumbbells  - 1 x daily - 7 x weekly - 2 sets - 10 reps       Goals:  Active       PT Problem       PT Goal 1 (Progressing)       Start:  25    Expected End:  25       LTG's:  1) Improve L  shoulder strength to >= 5-/5 throughout as her protocol allows in order to facilitate ability to reach overhead and lift objects.      4-6 weeks      2) Improve  L shoulder PROM  to >= 160 deg flex/abd and 80 deg ER/IR as her protocol allows in order to better reach overhead or behind back.       4-6 weeks      3) Improve  L shoulder pain from   5/10 to <= 0-3/10 with activity in order to improve QOL.        4-6 weeks      4) Improve quick dash score by >= 5 points in order to improve QOL.    4-6 weeks      5) The pt will improve ability to raise arm/shoulder overhead, reach at various angles, lift/carry objects, dress upper body, overhead activity and return to exercise and work around the home and/or on the job as her protocol allows without significant limitation.    4-6 weeks        ST) Pt/caregiver will be I and consistent with HEP with use of handout as needed in order to maximize shoulder strength and ROM/flexibility.       2-3 weeks

## 2025-06-30 ENCOUNTER — TREATMENT (OUTPATIENT)
Dept: PHYSICAL THERAPY | Facility: CLINIC | Age: 75
End: 2025-06-30
Payer: MEDICARE

## 2025-06-30 DIAGNOSIS — M25.512 CHRONIC LEFT SHOULDER PAIN: ICD-10-CM

## 2025-06-30 DIAGNOSIS — Z98.890 S/P LEFT ROTATOR CUFF REPAIR: ICD-10-CM

## 2025-06-30 DIAGNOSIS — G89.29 CHRONIC LEFT SHOULDER PAIN: ICD-10-CM

## 2025-06-30 PROCEDURE — 97110 THERAPEUTIC EXERCISES: CPT | Mod: GP,CQ

## 2025-06-30 PROCEDURE — 97140 MANUAL THERAPY 1/> REGIONS: CPT | Mod: GP,CQ

## 2025-06-30 ASSESSMENT — PAIN DESCRIPTION - DESCRIPTORS: DESCRIPTORS: ACHING

## 2025-06-30 ASSESSMENT — PAIN - FUNCTIONAL ASSESSMENT: PAIN_FUNCTIONAL_ASSESSMENT: 0-10

## 2025-06-30 NOTE — PROGRESS NOTES
Physical Therapy Treatment    Patient Name: Shona Hansen  MRN: [id number]  Today's Date: 6/30/2025  Time Calculation  Start Time: 0915  Stop Time: 1000  Time Calculation (min): 45 min  PT Therapeutic Procedures Time Entry  Therapeutic Exercise Time Entry: 34  Manual Therapy Time Entry: 8                 General:  General  Reason for Referral: L RTC Repair  Referred By: Dr. Hill  General Comment: Visit 3/8  (7/8) (8/8)  (9/9) (9/9)  Visit# 37    Assessment:   Progress reps with PRE's as pain allowed this visit.   Added CKC progression today for more proprioceptive input through the arm and shoulder.   Added new exercises to HEP with correct technique demonstrated and patient verbalizing understanding of instruction. (see below)      Objective:   IR ROM limitations with patient not able to reach behind her back easy.       Plan:  Plan to progress strength ROM for improved daily function with household     OP PT Plan  Treatment/Interventions: Cryotherapy, Education/ Instruction, Manual therapy, Therapeutic exercises, Therapeutic activities  PT Plan: Skilled PT  PT Frequency: 2 times per week (1-2x/week)  Duration: 4 weeks  Certification Period Start Date: 06/16/25  Rehab Potential: Good  Plan of Care Agreement: Patient (Patient Goal:  Improve strength, ROM and use of L shoulder)    Current Problem  Problem List Items Addressed This Visit           ICD-10-CM    S/P left rotator cuff repair Z98.890    Chronic left shoulder pain M25.512, G89.29       Subjective Patient identified by name and date of birth. Denies any recent falls.  Patient is compliant with HEP completion with no issues to report at this time.  States she was sore this morning when waking up. Notes that she did stretches and it felt better afterward. States that the arm aches. Notes her arm is still sore from last reports of making the bed and the shoulder hurting more afterward.     Precautions  Precautions  Precautions Comment: Lower Fall Risk.  PMH:  HTN, R trigger finger.   S/P L RTC Repair on 1/23/2025.  See Protocol    Pain  Pain Assessment: 0-10  0-10 (Numeric) Pain Score:  (.5)  Pain Location: Shoulder  Pain Orientation: Left  Pain Descriptors: Aching    Treatments:  UBE  fwd 3'   Wall slide flexion and scaption 2  x 10    Wall clocks resisted lime Green  x 10  ea dir   Active shoulder anterior delt raise to 90* 2 x 10 (1#)  Resisted Rows Purple tube 2 x 15   Resisted ext gentle Purple tube 2 x 15  Resisted ER 2 x 15  (Lime  green)   Resisted IR 2 x 15 (Lime green)   Resisted Tricep ext 2 x 10 (Mint Green)   Supine H ABD 2 x 15 Lime (Standing)  Standing Diagonal Habd Lime Green)    Chest press resisted Pink tubing 2 x 15    Supine cane flexion x10  (P to active no weight)   Supine protraction  2 x 10 (2#)   Wall push up with a plus x 10   Bent over row 5# 2 x 15 (P wt/reps)   Bicep curls supinated 2 x 10 4# (P wt)   Bicep curls neutral 2 x 10 4# (P wt)   Ball on wall  up wall 2# x10 ea dir   CKC wt shifts With BOSU Med/Lateral 2 x 10 (N)  AAROM standing IR with dowel x 10 (N)   AAROM standing IR/lateral dowel x 10 (N)   AAROM standing extension with dowel behind back x 10 (N)      Seated ER with wand x 10 (X)        OP EDUCATION:   Access Code: [email or web address]  URL: [email or web address]  Date: 06/30/2025  Prepared by: Rosa M Mcneal    Exercises  - Standing Bilateral Shoulder Internal Rotation AAROM with Dowel  - 1 x daily - 7 x weekly - 2 sets - 10 reps  - Standing Shoulder Internal Rotation AAROM with Dowel  - 1 x daily - 7 x weekly - 2 sets - 10 reps  - Standing Shoulder Extension with Dowel  - 1 x daily - 7 x weekly - 2 sets - 10 reps  Access Code: [email or web address]  URL: [email or web address]  Date: 06/25/2025  Prepared by: Rosa M Mcneal     Exercises  - Standing Row with Anchored Resistance  - 1 x daily - 7 x weekly - 2 sets - 10 reps  - Shoulder Extension with Resistance  - 1 x daily - 7 x weekly - 2 sets - 10 reps  -  Shoulder External Rotation with Anchored Resistance  - 1 x daily - 7 x weekly - 2 sets - 10 reps  - Shoulder Internal Rotation with Resistance  - 1 x daily - 7 x weekly - 2 sets - 10 reps  - Standing Shoulder Horizontal Abduction with Resistance  - 1 x daily - 7 x weekly - 2 sets - 10 reps  - Standing Shoulder Diagonal Horizontal Abduction 60/120 Degrees with Resistance  - 1 x daily - 7 x weekly - 2 sets - 10 reps - 2 hold  - Standing Bicep Curls Supinated with Dumbbells  - 1 x daily - 7 x weekly - 2 sets - 10 reps  - Standing Bicep Curls Neutral with Dumbbells  - 1 x daily - 7 x weekly - 2 sets - 10 reps  - Standing Tricep Extensions with Resistance  - 1 x daily - 7 x weekly - 2 sets - 10 reps     Access Code: [email or web address]  URL: [email or web address]  Date: 04/23/2025  Prepared by: Rosa M Mcneal     Exercises  - Supine Shoulder Flexion Extension Full Range AROM  - 1 x daily - 7 x weekly - 2 sets - 10 reps  - Supine Shoulder Horizontal Abduction with Resistance  - 1 x daily - 7 x weekly - 2 sets - 10 reps  - Seated Shoulder Flexion with Dumbbells  - 1 x daily - 7 x weekly - 2 sets - 10 reps    Goals:  Active       PT Problem       PT Goal 1 (Progressing)       Start:  01/29/25    Expected End:  04/25/25       LTG's:  1) Improve L  shoulder strength to >= 5-/5 throughout as her protocol allows in order to facilitate ability to reach overhead and lift objects.      4-6 weeks      2) Improve  L shoulder PROM  to >= 160 deg flex/abd and 80 deg ER/IR as her protocol allows in order to better reach overhead or behind back.       4-6 weeks      3) Improve  L shoulder pain from   5/10 to <= 0-3/10 with activity in order to improve QOL.        4-6 weeks      4) Improve quick dash score by >= 5 points in order to improve QOL.    4-6 weeks      5) The pt will improve ability to raise arm/shoulder overhead, reach at various angles, lift/carry objects, dress upper body, overhead activity and return to exercise  and work around the home and/or on the job as her protocol allows without significant limitation.    4-6 weeks        ST) Pt/caregiver will be I and consistent with HEP with use of handout as needed in order to maximize shoulder strength and ROM/flexibility.       2-3 weeks

## 2025-07-02 ENCOUNTER — TREATMENT (OUTPATIENT)
Dept: PHYSICAL THERAPY | Facility: CLINIC | Age: 75
End: 2025-07-02
Payer: MEDICARE

## 2025-07-02 DIAGNOSIS — G89.29 CHRONIC LEFT SHOULDER PAIN: ICD-10-CM

## 2025-07-02 DIAGNOSIS — Z98.890 S/P LEFT ROTATOR CUFF REPAIR: ICD-10-CM

## 2025-07-02 DIAGNOSIS — M25.512 CHRONIC LEFT SHOULDER PAIN: ICD-10-CM

## 2025-07-02 PROCEDURE — 97140 MANUAL THERAPY 1/> REGIONS: CPT | Mod: GP,CQ

## 2025-07-02 PROCEDURE — 97110 THERAPEUTIC EXERCISES: CPT | Mod: GP,CQ

## 2025-07-02 ASSESSMENT — PAIN SCALES - GENERAL: PAINLEVEL_OUTOF10: 0 - NO PAIN

## 2025-07-02 ASSESSMENT — PAIN DESCRIPTION - DESCRIPTORS: DESCRIPTORS: ACHING

## 2025-07-02 ASSESSMENT — PAIN - FUNCTIONAL ASSESSMENT: PAIN_FUNCTIONAL_ASSESSMENT: 0-10

## 2025-07-02 NOTE — PROGRESS NOTES
Physical Therapy Treatment    Patient Name: Shona Hansen  MRN: [id number]  Today's Date: 7/2/2025  Time Calculation  Start Time: 1002  Stop Time: 1048  Time Calculation (min): 46 min  PT Therapeutic Procedures Time Entry  Therapeutic Exercise Time Entry: 35  Manual Therapy Time Entry: 8                 General:  General  Reason for Referral: L RTC Repair  Referred By: Dr. Hill  General Comment: Visit 4/8  (7/8) (8/8)  (9/9) (9/9)  Visit# 38    Assessment:   Did not progress HEP this visit since patient was not able to do new HEP give last visit.   Overall tightness has decreased at end range with passive stretches all planes.     Objective:   Improved concentric  and eccentric strength with resisted PRE'.s  Weakness with shoulder extension with dowel     Plan:  Plan to progress strength as able for ease of ADL's. JA       OP PT Plan  Treatment/Interventions: Cryotherapy, Education/ Instruction, Manual therapy, Therapeutic exercises, Therapeutic activities  PT Plan: Skilled PT  PT Frequency: 2 times per week (1-2x/week)  Duration: 4 weeks  Certification Period Start Date: 06/16/25  Rehab Potential: Good  Plan of Care Agreement: Patient (Patient Goal:  Improve strength, ROM and use of L shoulder)    Current Problem  Problem List Items Addressed This Visit           ICD-10-CM    S/P left rotator cuff repair Z98.890    Chronic left shoulder pain M25.512, G89.29       Subjective Patient identified by name and date of birth. Denies any recent falls.  Patient is compliant with HEP completion with no issues to report at this time but did not get a chance to try the newly added ones from last visit.   States that she is not having any pain today.     Precautions  Precautions  Precautions Comment: Lower Fall Risk.  PMH: HTN, R trigger finger.   S/P L RTC Repair on 1/23/2025.  See Protocol    Pain  Pain Assessment: 0-10  0-10 (Numeric) Pain Score: 0 - No pain  Pain Location: Shoulder  Pain Orientation: Left  Pain Descriptors:  Aching    Treatments:  UBE  fwd 3'   Wall slide flexion and scaption 2  x 10    Wall clocks resisted lime Green  x 10  ea dir   Active shoulder anterior delt raise to 90* 2 x 10 (1#)  Resisted Rows Purple tube 2 x 15 (P pink tube)  Resisted ext gentle Purple tube 2 x 15 (P pink tube)   Resisted ER 2 x 15  (Lime  green)   Resisted IR 2 x 15 (Lime green)   Resisted Tricep ext 2 x 10 (Mint Green)   Standing H ABD 2 x 15 Lime   Standing Diagonal Habd Lime Green 2 x 10     Chest press resisted Pink tubing 2 x 15    Supine cane flexion x10  (P to active no weight)   Supine protraction  2 x 10 (2#)   Wall push up with a plus x 10   Bent over row 5# 2 x 15 (P wt/reps)   Bicep curls supinated 2 x 10 4#   Bicep curls neutral 2 x 10 4#   Ball on wall  up wall 2# x10 ea dir   CKC wt shifts With BOSU Med/Lateral 2 x 10   AAROM standing IR with dowel x 10   AAROM standing IR/lateral dowel x 10   AAROM standing extension with dowel behind back x 10      Seated ER with wand x 10 (X)        OP EDUCATION:   Access Code: [email or web address]  URL: [email or web address]  Date: 06/30/2025  Prepared by: Rosa M Mcneal     Exercises  - Standing Bilateral Shoulder Internal Rotation AAROM with Dowel  - 1 x daily - 7 x weekly - 2 sets - 10 reps  - Standing Shoulder Internal Rotation AAROM with Dowel  - 1 x daily - 7 x weekly - 2 sets - 10 reps  - Standing Shoulder Extension with Dowel  - 1 x daily - 7 x weekly - 2 sets - 10 reps  Access Code: [email or web address]  URL: [email or web address]  Date: 06/25/2025  Prepared by: Rosa M Mcneal     Exercises  - Standing Row with Anchored Resistance  - 1 x daily - 7 x weekly - 2 sets - 10 reps  - Shoulder Extension with Resistance  - 1 x daily - 7 x weekly - 2 sets - 10 reps  - Shoulder External Rotation with Anchored Resistance  - 1 x daily - 7 x weekly - 2 sets - 10 reps  - Shoulder Internal Rotation with Resistance  - 1 x daily - 7 x weekly - 2 sets - 10 reps  - Standing  Shoulder Horizontal Abduction with Resistance  - 1 x daily - 7 x weekly - 2 sets - 10 reps  - Standing Shoulder Diagonal Horizontal Abduction 60/120 Degrees with Resistance  - 1 x daily - 7 x weekly - 2 sets - 10 reps - 2 hold  - Standing Bicep Curls Supinated with Dumbbells  - 1 x daily - 7 x weekly - 2 sets - 10 reps  - Standing Bicep Curls Neutral with Dumbbells  - 1 x daily - 7 x weekly - 2 sets - 10 reps  - Standing Tricep Extensions with Resistance  - 1 x daily - 7 x weekly - 2 sets - 10 reps     Access Code: [email or web address]  URL: [email or web address]  Date: 2025  Prepared by: Rosa M Mcneal     Exercises  - Supine Shoulder Flexion Extension Full Range AROM  - 1 x daily - 7 x weekly - 2 sets - 10 reps  - Supine Shoulder Horizontal Abduction with Resistance  - 1 x daily - 7 x weekly - 2 sets - 10 reps  - Seated Shoulder Flexion with Dumbbells  - 1 x daily - 7 x weekly - 2 sets - 10 reps    Goals:  Active       PT Problem       PT Goal 1 (Progressing)       Start:  25    Expected End:  25       LTG's:  1) Improve L  shoulder strength to >= 5-/5 throughout as her protocol allows in order to facilitate ability to reach overhead and lift objects.      4-6 weeks      2) Improve  L shoulder PROM  to >= 160 deg flex/abd and 80 deg ER/IR as her protocol allows in order to better reach overhead or behind back.       4-6 weeks      3) Improve  L shoulder pain from   5/10 to <= 0-3/10 with activity in order to improve QOL.        4-6 weeks      4) Improve quick dash score by >= 5 points in order to improve QOL.    4-6 weeks      5) The pt will improve ability to raise arm/shoulder overhead, reach at various angles, lift/carry objects, dress upper body, overhead activity and return to exercise and work around the home and/or on the job as her protocol allows without significant limitation.    4-6 weeks        ST) Pt/caregiver will be I and consistent with HEP with use of handout as  needed in order to maximize shoulder strength and ROM/flexibility.       2-3 weeks

## 2025-07-07 ENCOUNTER — TREATMENT (OUTPATIENT)
Dept: PHYSICAL THERAPY | Facility: CLINIC | Age: 75
End: 2025-07-07
Payer: MEDICARE

## 2025-07-07 DIAGNOSIS — M25.512 CHRONIC LEFT SHOULDER PAIN: ICD-10-CM

## 2025-07-07 DIAGNOSIS — G89.29 CHRONIC LEFT SHOULDER PAIN: ICD-10-CM

## 2025-07-07 DIAGNOSIS — Z98.890 S/P LEFT ROTATOR CUFF REPAIR: ICD-10-CM

## 2025-07-07 PROCEDURE — 97110 THERAPEUTIC EXERCISES: CPT | Mod: GP,CQ

## 2025-07-07 PROCEDURE — 97140 MANUAL THERAPY 1/> REGIONS: CPT | Mod: GP,CQ

## 2025-07-07 ASSESSMENT — PAIN DESCRIPTION - DESCRIPTORS: DESCRIPTORS: ACHING

## 2025-07-07 ASSESSMENT — PAIN - FUNCTIONAL ASSESSMENT: PAIN_FUNCTIONAL_ASSESSMENT: 0-10

## 2025-07-07 NOTE — PROGRESS NOTES
Physical Therapy Treatment    Patient Name: Shona Hansen  MRN: [id number]  Today's Date: 7/7/2025  Time Calculation  Start Time: 0916  Stop Time: 1000  Time Calculation (min): 44 min  PT Therapeutic Procedures Time Entry  Therapeutic Exercise Time Entry: 32  Manual Therapy Time Entry: 10                 General:  General  Reason for Referral: L RTC Repair  Referred By: Dr. Hill  General Comment: Visit 5/8  (7/8) (8/8)  (9/9) (9/9)  Visit# 39  Assessment:   PT was consulted at start of session d/t area of lipoma. He encouraged patient to phone her physician and let them know of the change.   Patient continued with PT today but exercises that bothered the area were held today.     Objective:   Observed swelling inferior to lipoma incision with fluid surrounding the area. Patient has depression directly under the lipoma incision with decreased skin pliability along the incision from scar tissue.   Audible crepitus with passive flexion intermittently.       Plan:  Plan to progress strength next visit for continued overall strengthening in functional planes. JA     OP PT Plan  Treatment/Interventions: Cryotherapy, Education/ Instruction, Manual therapy, Therapeutic exercises, Therapeutic activities  PT Plan: Skilled PT  PT Frequency: 2 times per week (1-2x/week)  Duration: 4 weeks  Certification Period Start Date: 06/16/25  Rehab Potential: Good  Plan of Care Agreement: Patient (Patient Goal:  Improve strength, ROM and use of L shoulder)    Current Problem  Problem List Items Addressed This Visit           ICD-10-CM    S/P left rotator cuff repair Z98.890    Chronic left shoulder pain M25.512, G89.29       Subjective Patient identified by name and date of birth. Denies any recent falls.  Patient is compliant with HEP completion with no issues to report at this time.  States she was pulling weeds the last few days and the area of the lipoma removal is more sunken in with increased soreness and popping in the shoulder.      Precautions  Precautions  Precautions Comment: Lower Fall Risk.  PMH: HTN, R trigger finger.   S/P L RTC Repair on 1/23/2025.  See Protocol    Pain  Pain Assessment: 0-10  0-10 (Numeric) Pain Score:  (.5)  Pain Location: Shoulder  Pain Orientation: Left  Pain Descriptors: Aching    Treatments:  UBE  fwd 3'   Wall slide flexion and scaption 2  x 10    Wall clocks resisted lime Green  x 10  ea dir   Active shoulder anterior delt raise to 90* 2 x 10 (1#)  Resisted Rows Pink tube 2 x 15   Resisted ext gentle Pink tube 2 x 15   Resisted ER 2 x 15  (Lime green)  (X Hold today)   Resisted IR 2 x 15 (Lime green) (X Hold today)   Resisted Tricep ext 2 x 10 (Mint Green)   Standing H ABD 2 x 15 Lime  (X hold today)   Standing Diagonal Habd Lime Green 2 x 10     Chest press resisted Pink tubing 2 x 15    Supine cane flexion x 10  (P to active no weight)   Supine protraction  2 x 10 (2#)   Wall push up with a plus x 10   Bent over row 5# 2 x 15   Bicep curls supinated 2 x 10 4#   Bicep curls neutral 2 x 10 4#   Ball on wall  up wall 2# x10 ea dir   CKC wt shifts With BOSU Med/Lateral 2 x 10   AAROM standing IR with dowel x 10   AAROM standing IR/lateral dowel x 10   AAROM standing extension with dowel behind back x 10      Seated ER with wand x 10 (X)        OP EDUCATION:   Access Code: [email or web address]  URL: [email or web address]  Date: 06/30/2025  Prepared by: Rosa M Mcneal     Exercises  - Standing Bilateral Shoulder Internal Rotation AAROM with Dowel  - 1 x daily - 7 x weekly - 2 sets - 10 reps  - Standing Shoulder Internal Rotation AAROM with Dowel  - 1 x daily - 7 x weekly - 2 sets - 10 reps  - Standing Shoulder Extension with Dowel  - 1 x daily - 7 x weekly - 2 sets - 10 reps  Access Code: [email or web address]  URL: [email or web address]  Date: 06/25/2025  Prepared by: Rosa M Mcneal     Exercises  - Standing Row with Anchored Resistance  - 1 x daily - 7 x weekly - 2 sets - 10 reps  - Shoulder  Extension with Resistance  - 1 x daily - 7 x weekly - 2 sets - 10 reps  - Shoulder External Rotation with Anchored Resistance  - 1 x daily - 7 x weekly - 2 sets - 10 reps  - Shoulder Internal Rotation with Resistance  - 1 x daily - 7 x weekly - 2 sets - 10 reps  - Standing Shoulder Horizontal Abduction with Resistance  - 1 x daily - 7 x weekly - 2 sets - 10 reps  - Standing Shoulder Diagonal Horizontal Abduction 60/120 Degrees with Resistance  - 1 x daily - 7 x weekly - 2 sets - 10 reps - 2 hold  - Standing Bicep Curls Supinated with Dumbbells  - 1 x daily - 7 x weekly - 2 sets - 10 reps  - Standing Bicep Curls Neutral with Dumbbells  - 1 x daily - 7 x weekly - 2 sets - 10 reps  - Standing Tricep Extensions with Resistance  - 1 x daily - 7 x weekly - 2 sets - 10 reps     Access Code: [email or web address]  URL: [email or web address]  Date: 04/23/2025  Prepared by: Rosa M Mcneal     Exercises  - Supine Shoulder Flexion Extension Full Range AROM  - 1 x daily - 7 x weekly - 2 sets - 10 reps  - Supine Shoulder Horizontal Abduction with Resistance  - 1 x daily - 7 x weekly - 2 sets - 10 reps  - Seated Shoulder Flexion with Dumbbells  - 1 x daily - 7 x weekly - 2 sets - 10 reps       Goals:  Active       PT Problem       PT Goal 1 (Progressing)       Start:  01/29/25    Expected End:  04/25/25       LTG's:  1) Improve L  shoulder strength to >= 5-/5 throughout as her protocol allows in order to facilitate ability to reach overhead and lift objects.      4-6 weeks      2) Improve  L shoulder PROM  to >= 160 deg flex/abd and 80 deg ER/IR as her protocol allows in order to better reach overhead or behind back.       4-6 weeks      3) Improve  L shoulder pain from   5/10 to <= 0-3/10 with activity in order to improve QOL.        4-6 weeks      4) Improve quick dash score by >= 5 points in order to improve QOL.    4-6 weeks      5) The pt will improve ability to raise arm/shoulder overhead, reach at various angles,  lift/carry objects, dress upper body, overhead activity and return to exercise and work around the home and/or on the job as her protocol allows without significant limitation.    4-6 weeks        ST) Pt/caregiver will be I and consistent with HEP with use of handout as needed in order to maximize shoulder strength and ROM/flexibility.       2-3 weeks

## 2025-07-09 ENCOUNTER — TREATMENT (OUTPATIENT)
Dept: PHYSICAL THERAPY | Facility: CLINIC | Age: 75
End: 2025-07-09
Payer: MEDICARE

## 2025-07-09 DIAGNOSIS — G89.29 CHRONIC LEFT SHOULDER PAIN: ICD-10-CM

## 2025-07-09 DIAGNOSIS — Z98.890 S/P LEFT ROTATOR CUFF REPAIR: ICD-10-CM

## 2025-07-09 DIAGNOSIS — M25.512 CHRONIC LEFT SHOULDER PAIN: ICD-10-CM

## 2025-07-09 PROCEDURE — 97140 MANUAL THERAPY 1/> REGIONS: CPT | Mod: GP,CQ

## 2025-07-09 PROCEDURE — 97110 THERAPEUTIC EXERCISES: CPT | Mod: GP,CQ

## 2025-07-09 ASSESSMENT — PAIN DESCRIPTION - DESCRIPTORS: DESCRIPTORS: ACHING

## 2025-07-09 ASSESSMENT — PAIN - FUNCTIONAL ASSESSMENT: PAIN_FUNCTIONAL_ASSESSMENT: 0-10

## 2025-07-09 NOTE — PROGRESS NOTES
Physical Therapy Treatment    Patient Name: Shona Hansen  MRN: [id number]  Today's Date: 7/9/2025  Time Calculation  Start Time: 1000  Stop Time: 1045  Time Calculation (min): 45 min  PT Therapeutic Procedures Time Entry  Therapeutic Exercise Time Entry: 33  Manual Therapy Time Entry: 10                 General:  General  Reason for Referral: L RTC Repair  Referred By: Dr. Hill  General Comment: Visit 6/8  (7/8) (8/8)  (9/9) (9/9)  Visit# 40      Assessment:   Able to complete most exercises today in clinic. Mild exacerbation of pain surrounding lipoma excision scar.   Reviewed HEP given previously and updated resistance band since next visit is recheck with PT.   Continues with audible and palpable crepitus with resisted PRE's and Passive flexion. No coinciding pain present with crepitus.       Objective:   Localized pain and tenderness along the lipoma incision and surrounding area.     Plan:  Plan to recheck with PT next visit. JA     OP PT Plan  Treatment/Interventions: Cryotherapy, Education/ Instruction, Manual therapy, Therapeutic exercises, Therapeutic activities  PT Plan: Skilled PT  PT Frequency: 2 times per week (1-2x/week)  Duration: 4 weeks  Certification Period Start Date: 06/16/25  Rehab Potential: Good  Plan of Care Agreement: Patient (Patient Goal:  Improve strength, ROM and use of L shoulder)    Current Problem  Problem List Items Addressed This Visit           ICD-10-CM    S/P left rotator cuff repair Z98.890    Chronic left shoulder pain M25.512, G89.29       Subjective Patient identified by name and date of birth. Denies any recent falls.  Patient is compliant with HEP completion with no issues to report at this time.  States that she sees MD today for the lipoma surgery.       Precautions  Precautions  Precautions Comment: Lower Fall Risk.  PMH: HTN, R trigger finger.   S/P L RTC Repair on 1/23/2025.  See Protocol    Pain  Pain Assessment: 0-10  0-10 (Numeric) Pain Score:  (.5)  Pain Location:  Shoulder  Pain Orientation: Left  Pain Descriptors: Aching    Treatments:   UBE  fwd 3'   Wall slide flexion and scaption 2  x 10    Wall clocks resisted lime Green  x 10  ea dir   Active shoulder anterior delt raise to 90* 2 x 10 (1#)  Resisted Rows Pink tube 2 x 15   Resisted ext gentle Pink tube 2 x 15   Resisted ER 2 x 15  (Lime green)  (X Hold today)   Resisted IR 2 x 15 (Lime green) (X Hold today)   Resisted Tricep ext 2 x 10 (Mint Green)   Standing H ABD 2 x 15 Lime  (X hold today)   Standing Diagonal Habd Lime Green 2 x 10     Chest press resisted Pink tubing 2 x 15    Supine cane flexion x 10  (P to active no weight)   Supine protraction  2 x 10 (2#)   Wall push up with a plus x 10   Bent over row 5# 2 x 15   Bicep curls supinated 2 x 10 4#   Bicep curls neutral 2 x 10 4#   Ball on wall  up wall 2# x10 ea dir   CKC wt shifts With BOSU Med/Lateral 2 x 10   AAROM standing IR with dowel x 10   AAROM standing IR/lateral dowel x 10   AAROM standing extension with dowel behind back x 10      Seated ER with wand x 10 (X)         Manual:   PROM all planes     OP EDUCATION:   Access Code: [email or web address]  URL: [email or web address]  Date: 06/30/2025  Prepared by: Rosa M Mcneal     Exercises  - Standing Bilateral Shoulder Internal Rotation AAROM with Dowel  - 1 x daily - 7 x weekly - 2 sets - 10 reps  - Standing Shoulder Internal Rotation AAROM with Dowel  - 1 x daily - 7 x weekly - 2 sets - 10 reps  - Standing Shoulder Extension with Dowel  - 1 x daily - 7 x weekly - 2 sets - 10 reps  Access Code: [email or web address]  URL: [email or web address]  Date: 06/25/2025  Prepared by: Rosa M Mcneal     Exercises  - Standing Row with Anchored Resistance  - 1 x daily - 7 x weekly - 2 sets - 10 reps  - Shoulder Extension with Resistance  - 1 x daily - 7 x weekly - 2 sets - 10 reps  - Shoulder External Rotation with Anchored Resistance  - 1 x daily - 7 x weekly - 2 sets - 10 reps  - Shoulder Internal  Rotation with Resistance  - 1 x daily - 7 x weekly - 2 sets - 10 reps  - Standing Shoulder Horizontal Abduction with Resistance  - 1 x daily - 7 x weekly - 2 sets - 10 reps  - Standing Shoulder Diagonal Horizontal Abduction 60/120 Degrees with Resistance  - 1 x daily - 7 x weekly - 2 sets - 10 reps - 2 hold  - Standing Bicep Curls Supinated with Dumbbells  - 1 x daily - 7 x weekly - 2 sets - 10 reps  - Standing Bicep Curls Neutral with Dumbbells  - 1 x daily - 7 x weekly - 2 sets - 10 reps  - Standing Tricep Extensions with Resistance  - 1 x daily - 7 x weekly - 2 sets - 10 reps     Access Code: [email or web address]  URL: [email or web address]  Date: 04/23/2025  Prepared by: Rosa M Mcneal     Exercises  - Supine Shoulder Flexion Extension Full Range AROM  - 1 x daily - 7 x weekly - 2 sets - 10 reps  - Supine Shoulder Horizontal Abduction with Resistance  - 1 x daily - 7 x weekly - 2 sets - 10 reps  - Seated Shoulder Flexion with Dumbbells  - 1 x daily - 7 x weekly - 2 sets - 10 reps    Goals:  Active       PT Problem       PT Goal 1 (Progressing)       Start:  01/29/25    Expected End:  04/25/25       LTG's:  1) Improve L  shoulder strength to >= 5-/5 throughout as her protocol allows in order to facilitate ability to reach overhead and lift objects.      4-6 weeks      2) Improve  L shoulder PROM  to >= 160 deg flex/abd and 80 deg ER/IR as her protocol allows in order to better reach overhead or behind back.       4-6 weeks      3) Improve  L shoulder pain from   5/10 to <= 0-3/10 with activity in order to improve QOL.        4-6 weeks      4) Improve quick dash score by >= 5 points in order to improve QOL.    4-6 weeks      5) The pt will improve ability to raise arm/shoulder overhead, reach at various angles, lift/carry objects, dress upper body, overhead activity and return to exercise and work around the home and/or on the job as her protocol allows without significant limitation.    4-6  weeks        ST) Pt/caregiver will be I and consistent with HEP with use of handout as needed in order to maximize shoulder strength and ROM/flexibility.       2-3 weeks

## 2025-07-14 ENCOUNTER — TREATMENT (OUTPATIENT)
Dept: PHYSICAL THERAPY | Facility: CLINIC | Age: 75
End: 2025-07-14
Payer: MEDICARE

## 2025-07-14 DIAGNOSIS — M25.512 CHRONIC LEFT SHOULDER PAIN: ICD-10-CM

## 2025-07-14 DIAGNOSIS — G89.29 CHRONIC LEFT SHOULDER PAIN: ICD-10-CM

## 2025-07-14 DIAGNOSIS — Z98.890 S/P LEFT ROTATOR CUFF REPAIR: ICD-10-CM

## 2025-07-14 PROCEDURE — 97110 THERAPEUTIC EXERCISES: CPT | Mod: GP

## 2025-07-14 ASSESSMENT — PAIN - FUNCTIONAL ASSESSMENT: PAIN_FUNCTIONAL_ASSESSMENT: 0-10

## 2025-07-14 ASSESSMENT — PAIN DESCRIPTION - DESCRIPTORS: DESCRIPTORS: ACHING

## 2025-07-14 NOTE — PROGRESS NOTES
Physical Therapy          Physical Therapy Treatment          Patient Name: Shona Hansen     MRN: [id number]     : 1950      Alok Hill     Today's Date: 2025     Time Calculation  Start Time: 910  Stop Time: 950  Time Calculation (min): 40 min     PT Therapeutic Procedures Time Entry  Therapeutic Exercise Time Entry: 40                       General     Reason for Referral: L RTC Repair  Referred By: Dr. Hill  General Comment: Visit   () ()  () ()     Visit #41           Current Problem     Problem List Items Addressed This Visit           ICD-10-CM       Musculoskeletal and Injuries    S/P left rotator cuff repair Z98.890    Chronic left shoulder pain M25.512, G89.29               Subjective      Pt reports seeing the surgeon that did the lipoma excision and he is happy c how it looks.      Pt.'s name and  were confirmed this date.          Pt. Reports compliance with HEP.          Precautions     Precautions  Precautions Comment: Lower Fall Risk.  PMH: HTN, R trigger finger.   S/P L RTC Repair on 2025.  See Protocol          Pain     Pain Assessment: 0-10  0-10 (Numeric) Pain Score:  (0.5/10)  Pain Location: Shoulder  Pain Orientation: Left  Pain Descriptors: Aching          Objective        Upper Extremity AROM:??   ??  RIGHT??  LEFT??    Shldr Abduction??    °??  ?? 152°?   Shldr ER??  ??  ?? 62°?   Shldr IR??  ??  ?? 60°?   Shldr Flexion??  ???  ??? 162°?   Elbow Flexion??  ???  ???    ?Elbow Ext??  ???  ???    ???  ???  ???      Upper Extremity Strength:??   MMT 5/5 max???  RIGHT??  LEFT??    Shldr Abduction??  ??  ?? 4+/5   Shldr ER??  ??  ?? 4+/5   Shldr IR??  ??  ?? 4+/5   Shldr Flexion??  ???  ??? 4+/5   Elbow Flexion??  ???  ???    ?Elbow Ext??  ???  ???    ???  ???  ???              Pt's name and  were confirmed today.          Outcome Measures:     Other Measures  Disability of Arm Shoulder Hand (DASH): 19          Treatments:       Therapeutic exercise  "  UBE 3'/3'  Flexion AROM x10  Scaption AROM x10  Flexion AAROM x10  AAROM ER x10  IR stretch x30\"  AAROM standing IR with dowel x10  x5\"  AAROM standing IR with dowel 5x10\"  AAROM standing IR/lateral dowel x 10   AAROM standing extension with dowel behind back x 10       Access Code: [email or web address]  URL: [email or web address]  Date: 07/14/2025  Prepared by: Noah Dyson    Exercises  - Sleeper Stretch  - 2 x daily - 7 x weekly - 1 sets - 5-10 reps - 10-20\" hold  - Standing Shoulder Internal Rotation AAROM with Dowel  - 2 x daily - 7 x weekly - 1 sets - 5-10 reps - 10-30\" hold  - Standing Bilateral Shoulder Internal Rotation AAROM with Dowel  - 2 x daily - 7 x weekly - 1-2 sets - 10 reps - 5\" hold  - Standing Shoulder Extension ROM with Dowel  - 2 x daily - 7 x weekly - 1-2 sets - 10 reps    PATIENT EDUCATION:   Outpatient Education  Individual(s) Educated: Patient  Education Provided: Anatomy, Body Mechanics, Home Exercise Program, POC, Post-Op Precautions  Risk and Benefits Discussed with Patient/Caregiver/Other: yes  Patient/Caregiver Demonstrated Understanding: yes  Plan of Care Discussed and Agreed Upon: yes  Patient Response to Education: Patient/Caregiver Verbalized Understanding of Information          Assessment:   Pt has made progress since last session, including AROM, strength, pain, QuickDASH of 19/55.              Plan:   Pt plans to continue c HEP, considering her Medicare therapy cap.   This will serve as the discharge summary.     OP PT Plan  Treatment/Interventions: Cryotherapy, Education/ Instruction, Manual therapy, Therapeutic exercises, Therapeutic activities  PT Plan: Skilled PT  PT Frequency: 2 times per week (1-2x/week)  Duration: 4 weeks  Certification Period Start Date: 06/16/25  Rehab Potential: Good  Plan of Care Agreement: Patient (Patient Goal:  Improve strength, ROM and use of L shoulder)          Goals:     Resolved       PT Problem       PT Goal 1 (Adequate for " Discharge)       Start:  25    Expected End:  25    Resolved:  25    LTG's:  1) Improve L  shoulder strength to >= 5-/5 throughout as her protocol allows in order to facilitate ability to reach overhead and lift objects.      4-6 weeks      2) Improve  L shoulder PROM  to >= 160 deg flex/abd and 80 deg ER/IR as her protocol allows in order to better reach overhead or behind back.       4-6 weeks      3) Improve  L shoulder pain from   5/10 to <= 0-3/10 with activity in order to improve QOL.        4-6 weeks      4) Improve quick dash score by >= 5 points in order to improve QOL.    4-6 weeks      5) The pt will improve ability to raise arm/shoulder overhead, reach at various angles, lift/carry objects, dress upper body, overhead activity and return to exercise and work around the home and/or on the job as her protocol allows without significant limitation.    4-6 weeks        ST) Pt/caregiver will be I and consistent with HEP with use of handout as needed in order to maximize shoulder strength and ROM/flexibility.       2-3 weeks